# Patient Record
Sex: FEMALE | Race: WHITE | Employment: OTHER | URBAN - METROPOLITAN AREA
[De-identification: names, ages, dates, MRNs, and addresses within clinical notes are randomized per-mention and may not be internally consistent; named-entity substitution may affect disease eponyms.]

---

## 2017-02-09 ENCOUNTER — TELEPHONE (OUTPATIENT)
Dept: FAMILY MEDICINE | Facility: CLINIC | Age: 57
End: 2017-02-09

## 2017-02-09 DIAGNOSIS — C25.1 MALIGNANT NEOPLASM OF BODY OF PANCREAS (H): ICD-10-CM

## 2017-02-09 DIAGNOSIS — Z20.828 EXPOSURE TO THE FLU: Primary | ICD-10-CM

## 2017-02-09 RX ORDER — OSELTAMIVIR PHOSPHATE 75 MG/1
75 CAPSULE ORAL DAILY
Qty: 10 CAPSULE | Refills: 0 | Status: SHIPPED | OUTPATIENT
Start: 2017-02-09 | End: 2017-04-04

## 2017-02-09 NOTE — TELEPHONE ENCOUNTER
Significant other with influenza    Orders Placed This Encounter     oseltamivir (TAMIFLU) 75 MG capsule     Sig: Take 1 capsule (75 mg) by mouth daily     Dispense:  10 capsule     Refill:  0     Called and reviewed

## 2017-04-04 ENCOUNTER — OFFICE VISIT (OUTPATIENT)
Dept: FAMILY MEDICINE | Facility: CLINIC | Age: 57
End: 2017-04-04
Payer: COMMERCIAL

## 2017-04-04 VITALS
SYSTOLIC BLOOD PRESSURE: 148 MMHG | DIASTOLIC BLOOD PRESSURE: 90 MMHG | HEIGHT: 66 IN | HEART RATE: 62 BPM | TEMPERATURE: 98.3 F | WEIGHT: 134.38 LBS | BODY MASS INDEX: 21.6 KG/M2

## 2017-04-04 DIAGNOSIS — R07.89 CHEST PRESSURE: ICD-10-CM

## 2017-04-04 DIAGNOSIS — H92.02 EAR PAIN, LEFT: Primary | ICD-10-CM

## 2017-04-04 DIAGNOSIS — R42 DIZZINESS: ICD-10-CM

## 2017-04-04 DIAGNOSIS — B35.1 ONYCHOMYCOSIS: ICD-10-CM

## 2017-04-04 DIAGNOSIS — Z20.828 EXPOSURE TO THE FLU: ICD-10-CM

## 2017-04-04 DIAGNOSIS — J45.20 MILD INTERMITTENT ASTHMA WITHOUT COMPLICATION: ICD-10-CM

## 2017-04-04 PROCEDURE — 99214 OFFICE O/P EST MOD 30 MIN: CPT | Performed by: FAMILY MEDICINE

## 2017-04-04 RX ORDER — CICLOPIROX 80 MG/ML
SOLUTION TOPICAL
Qty: 6.6 ML | Refills: 5 | Status: SHIPPED | OUTPATIENT
Start: 2017-04-04 | End: 2018-09-17

## 2017-04-04 NOTE — MR AVS SNAPSHOT
After Visit Summary   4/4/2017    Kiersten Garibay    MRN: [id number]           Patient Information     Date Of Birth          1960        Visit Information        Provider Department      4/4/2017 10:40 AM Jhon Abbott DO [facility]        Today's Diagnoses     Ear pain, left    -  1    Exposure to the flu        Onychomycosis          Care Instructions    Follow up with ENT as discussed  Close monitoring of chest pressure  Advised ED if not resolving  I would recommend EKG and chest x-ray today as discussed    [facility]   Discharged by : Tiffani BETH, Certified Medical Assistant (AAMA)April 4, 2017 11:31 AM    Paper scripts provided to patient : none   If you have any questions regarding to your visit please contact your care team:   [facility] Clinic Hours Telephone Number   Dr. Tiffani Sorensen, TRACE   7am-7pm Monday - Thursday   7am-5pm Fridays  [phone]   (Appointment scheduling available 24/7)   RN Line   [phone] option 2     What options do I have for visits at the clinic other than the traditional office visit?   To expand how we care for you, many of our providers are utilizing electronic visits (e-visits) and telephone visits, when medically appropriate, for interactions with their patients rather than a visit in the clinic. We also offer nurse visits for many medical concerns. Just like any other service, we will bill your insurance company for this type of visit based on time spent on the phone with your provider. Not all insurance companies cover these visits. Please check with your medical insurance if this type of visit is covered. You will be responsible for any charges that are not paid by your insurance.   E-visits via Wicked Loot: generally incur a $35.00 fee.   Telephone visits:   Time spent on the phone: *charged based on time that is spent on the phone in increments of 10  minutes. Estimated cost:   5-10 mins $30.00   11-20 mins. $59.00   21-30 mins. $85.00   Use Yuntaa (secure email communication and access to your chart) to send your primary care provider a message or make an appointment. Ask someone on your Team how to sign up for Yuntaa.   For a Price Quote for your services, please call our Consumer Price Line at [phone].   As always, Thank you for trusting us with your health care needs!                    [facility]:    Scheduling Appointments  [place]  Call: [phone]    [place], [facility]  Call: [phone]    [facility]  Call: [phone]    WHERE TO GO FOR CARE?    Clinic    Make an appointment if you:       Are sick (cold, cough, flu, sore throat, earache or in pain).       Have a small injury (sprain, small cut, burn or broken bone).       Need a physical exam, Pap smear, vaccine or prescription refill.       Have questions about your health or medicines.    To reach us:      Call [phone] ([phone]). Open 24 hours every day. (For counseling services, call [phone].)    Log into Yuntaa at [email or web address]. (Visit [email or web address] to create an account.) Hospital emergency room    An emergency is a serious or life- threatening problem that must be treated right away.    Call [phone] or get to the hospital if you have:      Very bad or sudden:            - Chest pain or pressure         - Bleeding         - Head or belly pain         - Dizziness or trouble seeing, walking or                          Speaking      Problems breathing      Blood in your vomit or you are coughing up blood      A major injury (knocked out, loss of a finger or limb, rape, broken bone protruding from skin)    A mental health crisis. (Or call the Mental Health Crisis line at 1-456.627.1534 or Suicide Prevention Hotline at 1-266.735.6603.)    Open 24 hours every day. You don't  need an appointment.     Urgent care    Visit urgent care for sickness or small injuries when the clinic is closed. You don't need an appointment. To check hours or find an urgent care near you, visit [email or web address]. Online care    Get online care from [facility]\"" for more than 70 common problems, like colds, allergies and infections. Open 24 hours every day at: [email or web address]   Need help deciding?    For advice about where to be seen, you may call your clinic and ask to speak with a nurse. We're here for you 24 hours every day.         If you are deaf or hard of hearing, please let us know. We provide many free services including sign language interpreters, oral interpreters, TTYs, telephone amplifiers, note takers and written materials.               Follow-ups after your visit        Additional Services     OTOLARYNGOLOGY REFERRAL       Your provider has referred you to: LARRYG: [facility] Kelli [phone]   [email or web address]    Please be aware that coverage of these services is subject to the terms and limitations of your health insurance plan.  Call member services at your health plan with any benefit or coverage questions.      Please bring the following with you to your appointment:    (1) Any X-Rays, CTs or MRIs which have been performed.  Contact the facility where they were done to arrange for  prior to your scheduled appointment.   (2) List of current medications  (3) This referral request   (4) Any documents/labs given to you for this referral                  Who to contact     If you have questions or need follow up information about today's clinic visit or your schedule please contact [facility] directly at [phone].  Normal or non-critical lab and imaging results will be communicated to you by MyChart, letter or phone within 4 business days after the clinic has received the results. If you do not hear from us  "within 7 days, please contact the clinic through Posterbee or phone. If you have a critical or abnormal lab result, we will notify you by phone as soon as possible.  Submit refill requests through Posterbee or call your pharmacy and they will forward the refill request to us. Please allow 3 business days for your refill to be completed.          Additional Information About Your Visit        Digital MagicsharZwipe Information     Posterbee gives you secure access to your electronic health record. If you see a primary care provider, you can also send messages to your care team and make appointments. If you have questions, please call your primary care clinic.  If you do not have a primary care provider, please call [phone] and they will assist you.        Care EveryWhere ID     This is your Care EveryWhere ID. This could be used by other organizations to access your [facility] medical records  [id number]        Your Vitals Were     Pulse Temperature Height Last Period BMI (Body Mass Index)       62 98.3  F (36.8  C) (Oral) 5' 5.5\" (1.664 m) 03/24/2006 22.02 kg/m2        Blood Pressure from Last 3 Encounters:   04/04/17 148/90   10/17/16 (!) 160/93   07/28/16 140/86    Weight from Last 3 Encounters:   04/04/17 134 lb 6 oz (61 kg)   10/17/16 133 lb 1.6 oz (60.4 kg)   07/28/16 133 lb 11.2 oz (60.6 kg)              We Performed the Following     OTOLARYNGOLOGY REFERRAL          Today's Medication Changes          These changes are accurate as of: 4/4/17 11:31 AM.  If you have any questions, ask your nurse or doctor.               Stop taking these medicines if you haven't already. Please contact your care team if you have questions.     order for DME   Stopped by:  Jhon Abbott, DO           vitamin B complex with vitamin C Tabs tablet   Stopped by:  Jhon Abbott, DO                Where to get your medicines      These medications were sent to [facility] [address]  [address] [facility] [address]     Phone:  [phone]     ciclopirox 8 % Soln                Primary Care Provider Office Phone # Fax #    Barak Childress [phone]       [facility] [address] [address]        Thank you!     Thank you for choosing [facility]  for your care. Our goal is always to provide you with excellent care. Hearing back from our patients is one way we can continue to improve our services. Please take a few minutes to complete the written survey that you may receive in the mail after your visit with us. Thank you!             Your Updated Medication List - Protect others around you: Learn how to safely use, store and throw away your medicines at www.disposemymeds.org.          This list is accurate as of: 4/4/17 11:31 AM.  Always use your most recent med list.                   Brand Name Dispense Instructions for use    beclomethasone 80 MCG/ACT Inhaler    QVAR    1 Inhaler    Inhale 2 puffs into the lungs 2 times daily       ciclopirox 8 % Soln     6.6 mL    Apply thin layer to toes daily, then remove with alcohol after 7 days. Repeat.       estradiol 0.1 MG/GM cream    ESTRACE    42.5 g    1-3 gr 3x a week as needed. One month supply       losartan 25 MG tablet    COZAAR    45 tablet    Take 0.5 tablets (12.5 mg) by mouth daily       MULTIVITAMIN Liqd          OMEGA-3 FISH OIL PO      Take by mouth daily       TRANSDERM-SCOP (1.5 MG) 72 hr patch   Generic drug:  scopolamine      Place 1 patch onto the skin every 72 hours       UNABLE TO FIND      MEDICATION NAME: OPC-3

## 2017-04-04 NOTE — PATIENT INSTRUCTIONS
Follow up with ENT as discussed  Close monitoring of chest pressure  Advised ED if not resolving  I would recommend EKG and chest x-ray today as discussed    [facility]   Discharged by : Tiffani BETH, Certified Medical Assistant (AAMA)April 4, 2017 11:31 AM    Paper scripts provided to patient : none   If you have any questions regarding to your visit please contact your care team:   [facility] Clinic Hours Telephone Number   Dr. Tiffani Sorensen PA-C   7am-7pm Monday - Thursday   7am-5pm Fridays  [phone]   (Appointment scheduling available 24/7)   RN Line   [phone] option 2     What options do I have for visits at the clinic other than the traditional office visit?   To expand how we care for you, many of our providers are utilizing electronic visits (e-visits) and telephone visits, when medically appropriate, for interactions with their patients rather than a visit in the clinic. We also offer nurse visits for many medical concerns. Just like any other service, we will bill your insurance company for this type of visit based on time spent on the phone with your provider. Not all insurance companies cover these visits. Please check with your medical insurance if this type of visit is covered. You will be responsible for any charges that are not paid by your insurance.   E-visits via Teraco Data Environments: generally incur a $35.00 fee.   Telephone visits:   Time spent on the phone: *charged based on time that is spent on the phone in increments of 10 minutes. Estimated cost:   5-10 mins $30.00   11-20 mins. $59.00   21-30 mins. $85.00   Use [employer]vironments (secure email communication and access to your chart) to send your primary care provider a message or make an appointment. Ask someone on your Team how to sign up for [employer]vironments.   For a Price Quote for your services, please call our Consumer Price Line at [phone].   As always, Thank you for trusting us with  your health care needs!                    [facility]:    Scheduling Appointments  [facility]  Call: [phone]    [facility]  Call: [phone]    [facility]  Call: [phone]    WHERE TO GO FOR CARE?    Clinic    Make an appointment if you:       Are sick (cold, cough, flu, sore throat, earache or in pain).       Have a small injury (sprain, small cut, burn or broken bone).       Need a physical exam, Pap smear, vaccine or prescription refill.       Have questions about your health or medicines.    To reach us:      Call [phone] ([phone]). Open 24 hours every day. (For counseling services, call [phone].)    Log into [employer] at BabyGlowz. (Visit [email or web address] to create an account.) Hospital emergency room    An emergency is a serious or life- threatening problem that must be treated right away.    Call 727 or get to the hospital if you have:      Very bad or sudden:            - Chest pain or pressure         - Bleeding         - Head or belly pain         - Dizziness or trouble seeing, walking or                          Speaking      Problems breathing      Blood in your vomit or you are coughing up blood      A major injury (knocked out, loss of a finger or limb, rape, broken bone protruding from skin)    A mental health crisis. (Or call the Mental Health Crisis line at 1-832.571.9373 or Suicide Prevention Hotline at 1-984.341.4348.)    Open 24 hours every day. You don't need an appointment.     Urgent care    Visit urgent care for sickness or small injuries when the clinic is closed. You don't need an appointment. To check hours or find an urgent care near you, visit [email or web address]. Online care    Get online care from [facility] for more than 70 common problems, like colds, allergies and infections. Open 24 hours every day at: [email or web address]   Need help  deciding?    For advice about where to be seen, you may call your clinic and ask to speak with a nurse. We're here for you 24 hours every day.         If you are deaf or hard of hearing, please let us know. We provide many free services including sign language interpreters, oral interpreters, TTYs, telephone amplifiers, note takers and written materials.
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
## 2017-04-04 NOTE — NURSING NOTE
"Chief Complaint   Patient presents with     Ear Problem       Initial /90 (BP Location: Right arm, Cuff Size: Adult Regular)  Pulse 62  Temp 98.3  F (36.8  C) (Oral)  Ht 5' 5.5\" (1.664 m)  Wt 134 lb 6 oz (61 kg)  LMP 03/24/2006  BMI 22.02 kg/m2 Estimated body mass index is 22.02 kg/(m^2) as calculated from the following:    Height as of this encounter: 5' 5.5\" (1.664 m).    Weight as of this encounter: 134 lb 6 oz (61 kg).  Medication Reconciliation: complete     Tiffani BETH, Certified Medical Assistant (AAMA)April 4, 2017 10:53 AM      "

## 2017-04-05 ASSESSMENT — ASTHMA QUESTIONNAIRES: ACT_TOTALSCORE: 24

## 2017-04-11 ENCOUNTER — OFFICE VISIT (OUTPATIENT)
Dept: OTOLARYNGOLOGY | Facility: CLINIC | Age: 57
End: 2017-04-11
Payer: COMMERCIAL

## 2017-04-11 ENCOUNTER — OFFICE VISIT (OUTPATIENT)
Dept: AUDIOLOGY | Facility: CLINIC | Age: 57
End: 2017-04-11
Payer: COMMERCIAL

## 2017-04-11 VITALS — HEIGHT: 66 IN | BODY MASS INDEX: 21.53 KG/M2 | WEIGHT: 134 LBS | RESPIRATION RATE: 16 BRPM

## 2017-04-11 DIAGNOSIS — R42 VERTIGO: Primary | ICD-10-CM

## 2017-04-11 DIAGNOSIS — H69.92 DYSFUNCTION OF EUSTACHIAN TUBE, LEFT: ICD-10-CM

## 2017-04-11 DIAGNOSIS — R42 INTERMITTENT VERTIGO: Primary | ICD-10-CM

## 2017-04-11 DIAGNOSIS — H92.02 REFERRED OTALGIA OF LEFT EAR: ICD-10-CM

## 2017-04-11 PROCEDURE — 92557 COMPREHENSIVE HEARING TEST: CPT | Performed by: AUDIOLOGIST

## 2017-04-11 PROCEDURE — 99203 OFFICE O/P NEW LOW 30 MIN: CPT | Performed by: OTOLARYNGOLOGY

## 2017-04-11 PROCEDURE — 92567 TYMPANOMETRY: CPT | Performed by: AUDIOLOGIST

## 2017-04-11 ASSESSMENT — PAIN SCALES - GENERAL: PAINLEVEL: MILD PAIN (2)

## 2017-04-11 NOTE — PROGRESS NOTES
Chief Complaint - Dizziness, ear pain    History of Present Illness - Kiersten Garibay is a 56 year old female who presents with dizziness. The patient describes vertigo in which the entire room spins, or they spin, or there is a sense of motion.  It lasts for hours to a day. This has happened 3-4 times in last year. This has been going on for a year. The patient has nausea. It is provoked by nothing she can point to. The patient has noted left ear symptoms. + left otalgia, and maybe otorrhea, but no hearing loss. No right ear troubles. Has a history of TMJ, but nothing lately. Has tried ibuprofen, helps the pain.     Past Medical History -   Patient Active Problem List   Diagnosis     Mild intermittent asthma     Stress urinary incontinence     CARDIOVASCULAR SCREENING; LDL GOAL LESS THAN 160     Low back pain     Hypertension goal BP (blood pressure) < 140/90     Malignant neoplasm of body of pancreas (H)     Abscess     Essential hypertension with goal blood pressure less than 140/90       Current Medications -   Current Outpatient Prescriptions:      ciclopirox 8 % SOLN, Apply thin layer to toes daily, then remove with alcohol after 7 days. Repeat., Disp: 6.6 mL, Rfl: 5     scopolamine (TRANSDERM-SCOP, 1.5 MG,) (1.5mg base/3day) patch, Place 1 patch onto the skin every 72 hours, Disp: , Rfl:      losartan (COZAAR) 25 MG tablet, Take 0.5 tablets (12.5 mg) by mouth daily, Disp: 45 tablet, Rfl: 3     beclomethasone (QVAR) 80 MCG/ACT Inhaler, Inhale 2 puffs into the lungs 2 times daily, Disp: 1 Inhaler, Rfl: 11     estradiol (ESTRACE) 0.1 MG/GM vaginal cream, 1-3 gr 3x a week as needed. One month supply, Disp: 42.5 g, Rfl: 2     Omega-3 Fatty Acids (OMEGA-3 FISH OIL PO), Take by mouth daily, Disp: , Rfl:      UNABLE TO FIND, MEDICATION NAME: OPC-3, Disp: , Rfl:      Multiple Vitamins-Minerals (MULTIVITAMIN) LIQD, , Disp: , Rfl:     Allergies -   Allergies   Allergen Reactions     No Known Drug Allergies         Social History -   Social History     Social History     Marital status:      Spouse name: N/A     Number of children: 2     Years of education: N/A     Occupational History           [employer].       [place]     Social History Main Topics     Smoking status: Former Smoker     Packs/day: 1.00     Years: 15.00     Types: Cigarettes     Quit date: 5/2/1990     Smokeless tobacco: Former User      Comment: quit 16-17 years ago     Alcohol use 0.0 oz/week     0 Standard drinks or equivalent per week      Comment: social - 5 drinks a week     Drug use: No     Sexual activity: Yes     Partners: Male     Other Topics Concern      Service No     Blood Transfusions No     Caffeine Concern No     Occupational Exposure No     Hobby Hazards No     Sleep Concern No     Stress Concern No     Weight Concern No     Special Diet No     Back Care No     Exercise Yes     Bike Helmet No     Seat Belt Yes     Self-Exams Yes     Parent/Sibling W/ Cabg, Mi Or Angioplasty Before 65f 55m? Yes     father     Social History Narrative    Caffeine intake/servings daily - 1-2    Calcium intake/servings daily - 0-1    Exercise 0 times weekly - describe walking, cardio, weights    Sunscreen used - Yes    Seatbelts used - Yes    Guns stored in the home - Yes    Self Breast Exam - No    Pap test up to date -  Yes as of today     Eye exam up to date -  Yes    Dental exam up to date -  Yes    DEXA scan up to date -  No    Flex Sig/Colonoscopy up to date -  Yes    Mammography up to date -  No    Immunizations reviewed and up to date - Yes    Abuse: Current or Past (Physical, Sexual or Emotional) - No    Do you feel safe in your environment - Yes    Do you cope well with stress - Yes    Do you suffer from insomnia - No        Nicolasa Oneal MA January 13, 2012                           Family History -   Family History   Problem Relation Age of Onset     CANCER Mother      KIDNEY CANCER     Lipids  "Mother      Glaucoma Mother      HEART DISEASE Father      Hypertension Father      DIABETES Father      Neurologic Disorder Father      MIGRAINES     Hypertension Sister      HEART DISEASE Paternal Grandmother      CANCER Paternal Grandmother      STOMACH CANCER     CEREBROVASCULAR DISEASE Maternal Grandfather      CEREBROVASCULAR DISEASE Paternal Grandfather      DIABETES Paternal Grandfather      Alzheimer Disease Maternal Grandmother      Lipids Sister        Review of Systems - As per HPI and PMHx, otherwise 7 system review of the head and neck negative.    Physical Exam  Resp 16  Ht 1.664 m (5' 5.5\")  Wt 60.8 kg (134 lb)  LMP 03/24/2006  BMI 21.96 kg/m2  General - The patient is in no distress.  Alert and oriented x3, answers questions and cooperates with examination appropriately.   Voice and Breathing - The patient was breathing comfortably without the use of accessory muscles. There was no wheezing, stridor, or stertor.  The patients voice was clear and strong, with no dysphonia.    Eyes - Pupils are reactive to light. Extraocular movements intact. Sclera were not icteric or injected, conjunctiva were pink and moist. No nystagmus.  Neurologic - Cranial nerves II-XII are grossly intact. Specifically, the facial nerve is intact, House-Brackmann grade 1 of 6.    Mouth - Examination of the oral cavity showed pink, healthy oral mucosa. No lesions or ulcerations noted.  The tongue was mobile and protrudes midline.   Oropharynx - The walls of the oropharynx were smooth, pink, moist, symmetric, and had no lesions or ulcerations. The uvula was midline and the palate raised symmetrically.   Neck -  Palpation of the occipital, submental, submandibular, internal jugular chain, and supraclavicular nodes did not demonstrate any abnormal lymph nodes or masses. The parotid glands were without masses. Palpation of the thyroid was soft and smooth, with no nodules or goiter appreciated.  The trachea was midline.  Ears - " The auricles appeared normal. The external auditory canals were nonedematous and nonerythematous. The tympanic membranes are normal in appearance, bony landmarks are intact.  No retraction, perforation, or masses.  No fluid or purulence was seen in the external canal or the middle ear.     Audiologic Studies - An audiogram and tympanogram were performed today as part of the evaluation and personally reviewed. The tympanogram shows normal Type A curves, with normal canal volumes and middle ear pressures.  There is no sign of eustachian tube dysfunction or middle ear effusion.  The audiogram was also normal.        A/P - Kiersten Garibay is a 56 year old female with dizziness. This seems most likely Meniere's versus vestibular migraine. She has no hearing loss, and so it cannot be called meniere's at this point. i gave her some information on these diagnosis. She can try salt restriction and return if things worsen. What bothers her more is the left ear pain. However, the ear canal, tympanic membrane, and middle ear appear normal. i worry this maybe TMJ. I gave her a handout on this, soft diet, warm packs, massage. If these things fail we may have to consider some imaging. She will let me know.          Turner Kirkland MD  Otolaryngology  [facility]

## 2017-04-11 NOTE — NURSING NOTE
"Chief Complaint   Patient presents with     Otalgia     Left ear     Dizziness       Initial Resp 16  Ht 1.664 m (5' 5.5\")  Wt 60.8 kg (134 lb)  LMP 03/24/2006  BMI 21.96 kg/m2 Estimated body mass index is 21.96 kg/(m^2) as calculated from the following:    Height as of this encounter: 1.664 m (5' 5.5\").    Weight as of this encounter: 60.8 kg (134 lb).  Medication Reconciliation: complete     Tabitha Wade MA    "

## 2017-04-11 NOTE — PROGRESS NOTES
Patient was referred to Audiology from ENT by Dr. Kirkland for a hearing examination. Patient reports long duration episodes of true vertigo which have been ongoing for the past few years. Recent onset of left ear otalgia.     Testing:    Otoscopy:   Clear canals free of cerumen bilaterally.     Tympanograms:   Tympanometric findings were Normal ear canal volume and compliance (Type A) bilaterally.     Thresholds:   Puretone thresholds obtained with insert earphones show normal hearing sensitivity for all frequencies tested bilaterally (see scanned audiogram). Speech reception thresholds were in agreement with puretone findings bilaterally. Speech discrimination scores were excellent bilaterally (Right: 100%; Left: 100%).     Discussed results with the patient.     Patient was returned to ENT for follow up.     Dain Villalba CCC-A  Licensed Audiologist  4/11/2017

## 2017-04-11 NOTE — PATIENT INSTRUCTIONS
General Scheduling Information  To schedule your CT/MRI scan, please contact [place] [facility] at [phone]   [address] [place] [address]    To schedule your Surgery, please contact our Specialty Schedulers at [phone]    ENT Clinic Locations Clinic Hours Telephone Number     [facility]  [address] [place] [address]   Tuesday:       8:00am -- 4:00pm    Wednesday:  8:00am - 4:00pm   To schedule an appointment with   Dr. Kirkland,   please contact our   Specialty Scheduling Department at:     [phone]       [facility]  [address] [place] [address]   Friday:          8:00am - 4:00pm         Urgent Care Locations Clinic Hours Telephone Numbers     [facility]  [address] [place] [address]     Monday-Friday:     11:00pm - 9:00pm    Saturday-Sunday:  9:00am - 5:00pm   [phone]     [facility]  [address] [place] [address]     Monday-Friday:      5:00pm - 9:00pm     Saturday-Sunday:  9:00am - 5:00pm   [phone]

## 2017-04-11 NOTE — MR AVS SNAPSHOT
After Visit Summary   4/11/2017    Kiersten Garibay    MRN: [id number]           Patient Information     Date Of Birth          1960        Visit Information        Provider Department      4/11/2017 11:45 AM Dain Mckinney AuD [facility]        Today's Diagnoses     Intermittent vertigo    -  1    Dysfunction of eustachian tube, left           Follow-ups after your visit        Who to contact     If you have questions or need follow up information about today's clinic visit or your schedule please contact [facility] directly at [phone].  Normal or non-critical lab and imaging results will be communicated to you by LeadGeniushart, letter or phone within 4 business days after the clinic has received the results. If you do not hear from us within 7 days, please contact the clinic through LeadGeniushart or phone. If you have a critical or abnormal lab result, we will notify you by phone as soon as possible.  Submit refill requests through [employer] or call your pharmacy and they will forward the refill request to us. Please allow 3 business days for your refill to be completed.          Additional Information About Your Visit        MyChart Information     [employer] gives you secure access to your electronic health record. If you see a primary care provider, you can also send messages to your care team and make appointments. If you have questions, please call your primary care clinic.  If you do not have a primary care provider, please call [phone] and they will assist you.        Care EveryWhere ID     This is your Care EveryWhere ID. This could be used by other organizations to access your [place] medical records  [id number]        Your Vitals Were     Last Period                   03/24/2006            Blood Pressure from Last 3 Encounters:   04/04/17 148/90   10/17/16 (!) 160/93   07/28/16 140/86    Weight from Last 3 Encounters:   04/11/17 134 lb (60.8 kg)    04/04/17 134 lb 6 oz (61 kg)   10/17/16 133 lb 1.6 oz (60.4 kg)              We Performed the Following     AUDIOGRAM/TYMPANOGRAM - INTERFACE     COMPREHENSIVE HEARING TEST     TYMPANOMETRY        Primary Care Provider Office Phone # Fax #    Barak Childress [phone]       [facility] [address] [place] [address]        Thank you!     Thank you for choosing [facility]  for your care. Our goal is always to provide you with excellent care. Hearing back from our patients is one way we can continue to improve our services. Please take a few minutes to complete the written survey that you may receive in the mail after your visit with us. Thank you!             Your Updated Medication List - Protect others around you: Learn how to safely use, store and throw away your medicines at www.disposemymeds.org.          This list is accurate as of: 4/11/17 12:38 PM.  Always use your most recent med list.                   Brand Name Dispense Instructions for use    beclomethasone 80 MCG/ACT Inhaler    QVAR    1 Inhaler    Inhale 2 puffs into the lungs 2 times daily       ciclopirox 8 % Soln     6.6 mL    Apply thin layer to toes daily, then remove with alcohol after 7 days. Repeat.       estradiol 0.1 MG/GM cream    ESTRACE    42.5 g    1-3 gr 3x a week as needed. One month supply       losartan 25 MG tablet    COZAAR    45 tablet    Take 0.5 tablets (12.5 mg) by mouth daily       MULTIVITAMIN Liqd          OMEGA-3 FISH OIL PO      Take by mouth daily       TRANSDERM-SCOP (1.5 MG) 72 hr patch   Generic drug:  scopolamine      Place 1 patch onto the skin every 72 hours       UNABLE TO FIND      MEDICATION NAME: OPC-3

## 2017-04-11 NOTE — MR AVS SNAPSHOT
After Visit Summary   4/11/2017    Kiersten Garibay    MRN: [id number]           Patient Information     Date Of Birth          1960        Visit Information        Provider Department      4/11/2017 11:45 AM Turner Kirkland MD [facility]        Today's Diagnoses     Vertigo    -  1    Referred otalgia of left ear          Care Instructions    General Scheduling Information  To schedule your CT/MRI scan, please contact [place] [facility] at [phone] [address] [place] [address]    To schedule your Surgery, please contact our Specialty Schedulers at [phone]    ENT Clinic Locations Clinic Hours Telephone Number     [place] [facility]  [address]  [place] [address]   Tuesday:       8:00am -- 4:00pm    Wednesday:  8:00am - 4:00pm   To schedule an appointment with   Dr. Kirkland,   please contact our   Specialty Scheduling Department at:     [phone]       [facility]  [address] [place] [address]   Friday:          8:00am - 4:00pm         Urgent Care Locations Clinic Hours Telephone Numbers     [place]  [address] [place] [address]     Monday-Friday:     11:00pm - 9:00pm    Saturday-Sunday:  9:00am - 5:00pm   [phone]     [place]  [address] [place] [address]     Monday-Friday:      5:00pm - 9:00pm     Saturday-Sunday:  9:00am - 5:00pm   [phone]             Follow-ups after your visit        Additional Services     AUDIOLOGY ADULT REFERRAL       Your provider has referred you to: [facility]: [facility] [phone]   [email or web address]    Treatment:  Evaluation & Treatment  Specialty Testing:  Audiogram w/Tymps and Reflexes    Please be aware that coverage of these services is subject to the terms and limitations of your health insurance plan.  Call member services at your health plan with any benefit or coverage questions.   "    Please bring the following to your appointment:  >>   Any x-rays, CTs or MRIs which have been performed.  Contact the facility where they were done to arrange for  prior to your scheduled appointment.   >>   List of current medications  >>   This referral request   >>   Any documents/labs given to you for this referral                  Who to contact     If you have questions or need follow up information about today's clinic visit or your schedule please contact [facility] directly at [phone].  Normal or non-critical lab and imaging results will be communicated to you by Sh[employer], letter or phone within 4 business days after the clinic has received the results. If you do not hear from us within 7 days, please contact the clinic through [employer] or phone. If you have a critical or abnormal lab result, we will notify you by phone as soon as possible.  Submit refill requests through [employer] or call your pharmacy and they will forward the refill request to us. Please allow 3 business days for your refill to be completed.          Additional Information About Your Visit        [employer] Information     [employer] gives you secure access to your electronic health record. If you see a primary care provider, you can also send messages to your care team and make appointments. If you have questions, please call your primary care clinic.  If you do not have a primary care provider, please call [phone] and they will assist you.        Care EveryWhere ID     This is your Care EveryWhere ID. This could be used by other organizations to access your [place] medical records  [id number]        Your Vitals Were     Respirations Height Last Period BMI (Body Mass Index)          16 1.664 m (5' 5.5\") 03/24/2006 21.96 kg/m2         Blood Pressure from Last 3 Encounters:   04/04/17 148/90   10/17/16 (!) 160/93   07/28/16 140/86    Weight from Last 3 Encounters:   04/11/17 60.8 kg (134 lb)   04/04/17 61 kg " (134 lb 6 oz)   10/17/16 60.4 kg (133 lb 1.6 oz)              We Performed the Following     AUDIOLOGY ADULT REFERRAL        Primary Care Provider Office Phone # Fax #    Barak Childress [phone]       [facility] [address] [place] [address]        Thank you!     Thank you for choosing [facility]  for your care. Our goal is always to provide you with excellent care. Hearing back from our patients is one way we can continue to improve our services. Please take a few minutes to complete the written survey that you may receive in the mail after your visit with us. Thank you!             Your Updated Medication List - Protect others around you: Learn how to safely use, store and throw away your medicines at www.disposemymeds.org.          This list is accurate as of: 4/11/17  4:07 PM.  Always use your most recent med list.                   Brand Name Dispense Instructions for use    beclomethasone 80 MCG/ACT Inhaler    QVAR    1 Inhaler    Inhale 2 puffs into the lungs 2 times daily       ciclopirox 8 % Soln     6.6 mL    Apply thin layer to toes daily, then remove with alcohol after 7 days. Repeat.       estradiol 0.1 MG/GM cream    ESTRACE    42.5 g    1-3 gr 3x a week as needed. One month supply       losartan 25 MG tablet    COZAAR    45 tablet    Take 0.5 tablets (12.5 mg) by mouth daily       MULTIVITAMIN Liqd          OMEGA-3 FISH OIL PO      Take by mouth daily       TRANSDERM-SCOP (1.5 MG) 72 hr patch   Generic drug:  scopolamine      Place 1 patch onto the skin every 72 hours       UNABLE TO FIND      MEDICATION NAME: OPC-3

## 2017-07-02 DIAGNOSIS — I10 ESSENTIAL HYPERTENSION WITH GOAL BLOOD PRESSURE LESS THAN 140/90: ICD-10-CM

## 2017-07-03 ENCOUNTER — MYC MEDICAL ADVICE (OUTPATIENT)
Dept: NURSING | Facility: CLINIC | Age: 57
End: 2017-07-03

## 2017-07-03 DIAGNOSIS — J45.20 MILD INTERMITTENT ASTHMA WITHOUT COMPLICATION: ICD-10-CM

## 2017-07-03 RX ORDER — LOSARTAN POTASSIUM 25 MG/1
TABLET ORAL
Qty: 45 TABLET | Refills: 0 | Status: SHIPPED | OUTPATIENT
Start: 2017-07-03 | End: 2017-09-11

## 2017-07-03 NOTE — TELEPHONE ENCOUNTER
My chart message sent patient is due for office visit. Medication is being filled for 1 time refill only due to:  office visit due    Amy Santamaria,Clinic Rn  [facility] [place]

## 2017-07-03 NOTE — TELEPHONE ENCOUNTER
losartan (COZAAR) 25 MG tablet   12.5 mg, DAILY 3 ordered  Edit     Summary: Take 0.5 tablets (12.5 mg) by mouth daily, Disp-45 tablet, R-3, E-Prescribe   Dose, Route, Frequency: 12.5 mg, Oral, DAILY  Start: 6/8/2016  Ord/Sold: 6/8/2016 (O)  Report  Taking:   Long-term:   Pharmacy: [address]  Med Dose History       Patient Sig: Take 0.5 tablets (12.5 mg) by mouth daily       Ordered on: 6/8/2016       Authorized by: KALEY RUSSELL       Dispense: 45 tablet          Last Office Visit with [facility] P or [facility] prescribing provider: 4-4-2017  Next 5 appointments (look out 90 days)     Aug 11, 2017  2:00 PM CDT   PHYSICAL with Kate Talbot MD   [facility] ([facility]    [address] [place] [address] [phone]                   Potassium   Date Value Ref Range Status   10/17/2016 3.7 3.4 - 5.3 mmol/L Final     Creatinine   Date Value Ref Range Status   10/17/2016 0.78 0.52 - 1.04 mg/dL Final     BP Readings from Last 3 Encounters:   04/04/17 148/90   10/17/16 (!) 160/93   07/28/16 140/86

## 2017-07-04 ENCOUNTER — MYC REFILL (OUTPATIENT)
Dept: FAMILY MEDICINE | Facility: CLINIC | Age: 57
End: 2017-07-04

## 2017-07-04 DIAGNOSIS — J45.20 MILD INTERMITTENT ASTHMA WITHOUT COMPLICATION: ICD-10-CM

## 2017-07-05 NOTE — TELEPHONE ENCOUNTER
My chart message sent routing to PCP do you wish to refill, has been over a year since visit and she is going out of town?  Amy Santamaria,Clinic Rn  [place]

## 2017-07-05 NOTE — TELEPHONE ENCOUNTER
beclomethasone (QVAR) 80 MCG/ACT Inhaler  Last Written Prescription Date: 04/09/2016  Last Fill Quantity: 1inhaler, # refills: 11    Last Office Visit with [facility], [facility] or [facility] prescribing provider:  04/04/2017   Future Office Visit:    Next 5 appointments (look out 90 days)     Aug 11, 2017  2:00 PM CDT   PHYSICAL with Kate Talbot MD   [facility] ([address] [address] [phone]                   Date of Last Asthma Action Plan Letter:   Asthma Action Plan Q1 Year    Topic Date Due     Asthma Action Plan - yearly  06/11/2013      Asthma Control Test:   ACT Total Scores 4/4/2017   ACT TOTAL SCORE (Goal Greater than or Equal to 20) 24   In the past 12 months, how many times did you visit the emergency room for your asthma without being admitted to the hospital? 0   In the past 12 months, how many times were you hospitalized overnight because of your asthma? 0       Date of Last Spirometry Test:   No results found for this or any previous visit.

## 2017-07-05 NOTE — TELEPHONE ENCOUNTER
Message from MyChart:  Original authorizing provider: Barak Childress MD, MD    Kiersten Garibay would like a refill of the following medications:  beclomethasone (QVAR) 80 MCG/ACT Inhaler [Barak Childress MD, MD]    Preferred pharmacy: [address] [facility].    Comment:

## 2017-07-06 NOTE — TELEPHONE ENCOUNTER
Chart reviewed. Kiersten has scheduled an appointment with Dr Talbot for annual exam on 8/11/17 but nothing yet with Dr Childress.  Telephone call to Kiersten- she will schedule, she is headed out today on a motorcycle trip to [facility]. Requests refill Qvar as well.   Latrice Azar RN  Triage  [facility]  [phone]

## 2017-07-06 NOTE — TELEPHONE ENCOUNTER
I am ok with refilling until she is able to come in. Refill was given if she needs more ok to give extra refill    Vamshi Childress MD

## 2017-07-15 ENCOUNTER — HEALTH MAINTENANCE LETTER (OUTPATIENT)
Age: 57
End: 2017-07-15

## 2017-08-10 ENCOUNTER — TELEPHONE (OUTPATIENT)
Dept: FAMILY MEDICINE | Facility: CLINIC | Age: 57
End: 2017-08-10

## 2017-08-10 NOTE — TELEPHONE ENCOUNTER
Reason for call:  Patient reporting a symptom    Symptom or request: fell, needing stiches    Duration (how long have symptoms been present): today    Additional comments: JAYANT Jameson triaging patient.      Call taken on 8/10/2017 at 11:51 AM by Indira Neely

## 2017-08-10 NOTE — TELEPHONE ENCOUNTER
Patient states she has a huge gash above her eye & she believes she needs stiches. She denies ER even thought I informed her they are the most equipped to prevent scarring & to handle facial/plastic issues but patient wants to speak with Laney Guerra is able to see her for facial stitches- I transferred her to their clinic per her request.    Gisell Berumen RN

## 2017-08-11 ENCOUNTER — OFFICE VISIT (OUTPATIENT)
Dept: OBGYN | Facility: CLINIC | Age: 57
End: 2017-08-11
Payer: COMMERCIAL

## 2017-08-11 VITALS
WEIGHT: 135 LBS | BODY MASS INDEX: 22.12 KG/M2 | HEART RATE: 73 BPM | TEMPERATURE: 98.2 F | DIASTOLIC BLOOD PRESSURE: 96 MMHG | SYSTOLIC BLOOD PRESSURE: 151 MMHG

## 2017-08-11 DIAGNOSIS — Z01.419 WELL WOMAN EXAM WITH ROUTINE GYNECOLOGICAL EXAM: Primary | ICD-10-CM

## 2017-08-11 DIAGNOSIS — I10 HYPERTENSION GOAL BP (BLOOD PRESSURE) < 140/90: ICD-10-CM

## 2017-08-11 PROCEDURE — 87624 HPV HI-RISK TYP POOLED RSLT: CPT | Performed by: OBSTETRICS & GYNECOLOGY

## 2017-08-11 PROCEDURE — G0123 SCREEN CERV/VAG THIN LAYER: HCPCS | Performed by: OBSTETRICS & GYNECOLOGY

## 2017-08-11 PROCEDURE — 99396 PREV VISIT EST AGE 40-64: CPT | Performed by: OBSTETRICS & GYNECOLOGY
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
## 2017-08-11 NOTE — MR AVS SNAPSHOT
After Visit Summary   8/11/2017    Kiersten Garibay    MRN: [id number]           Patient Information     Date Of Birth          1960        Visit Information        Provider Department      8/11/2017 2:00 PM Kate Talbot MD [facility]        Today's Diagnoses     Well woman exam with routine gynecological exam    -  1    Hypertension goal BP (blood pressure) < 140/90           Follow-ups after your visit        Who to contact     If you have questions or need follow up information about today's clinic visit or your schedule please contact [facility] directly at [phone].  Normal or non-critical lab and imaging results will be communicated to you by NBA Math Hoopshart, letter or phone within 4 business days after the clinic has received the results. If you do not hear from us within 7 days, please contact the clinic through Micro[employer] or phone. If you have a critical or abnormal lab result, we will notify you by phone as soon as possible.  Submit refill requests through [employer] or call your pharmacy and they will forward the refill request to us. Please allow 3 business days for your refill to be completed.          Additional Information About Your Visit        MyChart Information     [employer] gives you secure access to your electronic health record. If you see a primary care provider, you can also send messages to your care team and make appointments. If you have questions, please call your primary care clinic.  If you do not have a primary care provider, please call [phone] and they will assist you.        Care EveryWhere ID     This is your Care EveryWhere ID. This could be used by other organizations to access your [facility] medical records  [id number]        Your Vitals Were     Pulse Temperature Last Period BMI (Body Mass Index)          73 98.2  F (36.8  C) (Oral) 03/24/2006 22.12 kg/m2         Blood Pressure from Last 3 Encounters:   08/11/17 (!) 151/96    04/04/17 148/90   10/17/16 (!) 160/93    Weight from Last 3 Encounters:   08/11/17 135 lb (61.2 kg)   04/11/17 134 lb (60.8 kg)   04/04/17 134 lb 6 oz (61 kg)              We Performed the Following     HPV High Risk Types DNA Cervical     Pap imaged thin layer screen with HPV - recommended age 30 - 65 years (select HPV order below)          Today's Medication Changes          These changes are accurate as of: 8/11/17  2:57 PM.  If you have any questions, ask your nurse or doctor.               These medicines have changed or have updated prescriptions.        Dose/Directions    beclomethasone 80 MCG/ACT Inhaler   Commonly known as:  QVAR   This may have changed:    - when to take this  - reasons to take this   Used for:  Mild intermittent asthma without complication        Dose:  2 puff   Inhale 2 puffs into the lungs 2 times daily   Quantity:  1 Inhaler   Refills:  1                Primary Care Provider Office Phone # Fax #    Barak Childress [phone]       [facility] [address]        Equal Access to Services     [facility] HospitalJOANA AH: Hadii aad ku hadasho Somoncho, waaxda luqadaha, qaybta kaalmada thelma, leonard villeda . So [facility] [phone].    ATENCIÓN: Si habla español, tiene a king disposición servicios gratuitos de asistencia lingüística. Llame al [phone].    We comply with applicable federal civil rights laws and [place] laws. We do not discriminate on the basis of race, color, national origin, age, disability sex, sexual orientation or gender identity.            Thank you!     Thank you for choosing [facility]  for your care. Our goal is always to provide you with excellent care. Hearing back from our patients is one way we can continue to improve our services. Please take a few minutes to complete the written survey that you may receive in the mail after your visit with us. Thank you!             Your Updated  Medication List - Protect others around you: Learn how to safely use, store and throw away your medicines at www.disposemymeds.org.          This list is accurate as of: 8/11/17  2:57 PM.  Always use your most recent med list.                   Brand Name Dispense Instructions for use Diagnosis    beclomethasone 80 MCG/ACT Inhaler    QVAR    1 Inhaler    Inhale 2 puffs into the lungs 2 times daily    Mild intermittent asthma without complication       ciclopirox 8 % Soln     6.6 mL    Apply thin layer to toes daily, then remove with alcohol after 7 days. Repeat.    Onychomycosis       estradiol 0.1 MG/GM cream    ESTRACE    42.5 g    1-3 gr 3x a week as needed. One month supply    Dyspareunia       losartan 25 MG tablet    COZAAR    45 tablet    TAKE ONE-HALF TABLET BY MOUTH EVERY DAY    Essential hypertension with goal blood pressure less than 140/90       MULTIVITAMIN Liqd           OMEGA-3 FISH OIL PO      Take by mouth daily        TRANSDERM-SCOP (1.5 MG) 72 hr patch   Generic drug:  scopolamine      Place 1 patch onto the skin as needed        UNABLE TO FIND      MEDICATION NAME: OPC-3

## 2017-08-15 LAB
COPATH REPORT: NORMAL
PAP: NORMAL

## 2017-08-17 LAB
FINAL DIAGNOSIS: NORMAL
HPV HR 12 DNA CVX QL NAA+PROBE: NEGATIVE
HPV16 DNA SPEC QL NAA+PROBE: NEGATIVE
HPV18 DNA SPEC QL NAA+PROBE: NEGATIVE
SPECIMEN DESCRIPTION: NORMAL

## 2017-09-11 ENCOUNTER — OFFICE VISIT (OUTPATIENT)
Dept: FAMILY MEDICINE | Facility: CLINIC | Age: 57
End: 2017-09-11
Payer: COMMERCIAL

## 2017-09-11 VITALS
HEIGHT: 66 IN | HEART RATE: 68 BPM | DIASTOLIC BLOOD PRESSURE: 88 MMHG | BODY MASS INDEX: 21.4 KG/M2 | WEIGHT: 133.13 LBS | TEMPERATURE: 98.6 F | SYSTOLIC BLOOD PRESSURE: 136 MMHG

## 2017-09-11 DIAGNOSIS — C25.1 MALIGNANT NEOPLASM OF BODY OF PANCREAS (H): ICD-10-CM

## 2017-09-11 DIAGNOSIS — R73.9 ELEVATED BLOOD SUGAR: ICD-10-CM

## 2017-09-11 DIAGNOSIS — R42 VERTIGO: ICD-10-CM

## 2017-09-11 DIAGNOSIS — I10 ESSENTIAL HYPERTENSION WITH GOAL BLOOD PRESSURE LESS THAN 140/90: Primary | ICD-10-CM

## 2017-09-11 PROCEDURE — 99214 OFFICE O/P EST MOD 30 MIN: CPT | Performed by: FAMILY MEDICINE

## 2017-09-11 PROCEDURE — 80048 BASIC METABOLIC PNL TOTAL CA: CPT | Performed by: FAMILY MEDICINE

## 2017-09-11 PROCEDURE — 36415 COLL VENOUS BLD VENIPUNCTURE: CPT | Performed by: FAMILY MEDICINE

## 2017-09-11 RX ORDER — LOSARTAN POTASSIUM 25 MG/1
25 TABLET ORAL DAILY
Qty: 90 TABLET | Refills: 3 | Status: SHIPPED | OUTPATIENT
Start: 2017-09-11 | End: 2018-10-01 | Stop reason: DRUGHIGH

## 2017-09-11 NOTE — Clinical Note
Dr Kirkland  I saw Kiersten Shettyloren today and she has had 3 episodes in the past 4-5 months of severe vertigo. It resolves spontaneously without and other associated symptoms. She does note left ear discomfort when it occurs. It may be associated with allergies but it comes on and resolves in same day. Wendy you recommend any specific evaluation in addition to what you have already done. She does have a hx of pancreatic cancer but this has been stable and oncology has her on a 2 year follow up plan.  Thanks for your help with her care  Vamshi Childress MD

## 2017-09-11 NOTE — MR AVS SNAPSHOT
"              After Visit Summary   9/11/2017    Kiersten Garibay    MRN: [id number]           Patient Information     Date Of Birth          1960        Visit Information        Provider Department      9/11/2017 1:20 PM Barak Childress MD [facility]        Today's Diagnoses     Essential hypertension with goal blood pressure less than 140/90           Follow-ups after your visit        Who to contact     If you have questions or need follow up information about today's clinic visit or your schedule please contact [facility] directly at [phone].  Normal or non-critical lab and imaging results will be communicated to you by Technitrolhart, letter or phone within 4 business days after the clinic has received the results. If you do not hear from us within 7 days, please contact the clinic through WholeWorldBandt or phone. If you have a critical or abnormal lab result, we will notify you by phone as soon as possible.  Submit refill requests through [employer] or call your pharmacy and they will forward the refill request to us. Please allow 3 business days for your refill to be completed.          Additional Information About Your Visit        MyChart Information     [employer] gives you secure access to your electronic health record. If you see a primary care provider, you can also send messages to your care team and make appointments. If you have questions, please call your primary care clinic.  If you do not have a primary care provider, please call [phone] and they will assist you.        Care EveryWhere ID     This is your Care EveryWhere ID. This could be used by other organizations to access your [place] medical records  [id number]        Your Vitals Were     Pulse Temperature Height Last Period BMI (Body Mass Index)       68 98.6  F (37  C) (Oral) 5' 5.5\" (1.664 m) 03/24/2006 21.82 kg/m2        Blood Pressure from Last 3 Encounters:   09/11/17 136/88   08/11/17 (!) " 151/96   04/04/17 148/90    Weight from Last 3 Encounters:   09/11/17 133 lb 2 oz (60.4 kg)   08/11/17 135 lb (61.2 kg)   04/11/17 134 lb (60.8 kg)              We Performed the Following     Basic metabolic panel          Today's Medication Changes          These changes are accurate as of: 9/11/17  2:24 PM.  If you have any questions, ask your nurse or doctor.               These medicines have changed or have updated prescriptions.        Dose/Directions    beclomethasone 80 MCG/ACT Inhaler   Commonly known as:  QVAR   This may have changed:    - when to take this  - reasons to take this   Used for:  Mild intermittent asthma without complication        Dose:  2 puff   Inhale 2 puffs into the lungs 2 times daily   Quantity:  1 Inhaler   Refills:  1       losartan 25 MG tablet   Commonly known as:  COZAAR   This may have changed:  See the new instructions.   Used for:  Essential hypertension with goal blood pressure less than 140/90   Changed by:  Barak Childress MD        Dose:  25 mg   Take 1 tablet (25 mg) by mouth daily   Quantity:  90 tablet   Refills:  3            Where to get your medicines      These medications were sent to [facility] [address].  [address] [place] [address], [place] [address]     Phone:  [phone]     losartan 25 MG tablet                Primary Care Provider Office Phone # Fax #    Barak Childress [phone]       [address] [place] [address]        Equal Access to Services     MINAL KONG AH: Archana irizarryo Sokellieali, waaxda luqadaha, qaybta kaalmada adeegyada, leonard buckner. So [facility] [phone].    ATENCIÓN: Si habla español, tiene a king disposición servicios gratuitos de asistencia lingüística. Llame al [phone].    We comply with applicable federal civil rights laws and [place] laws. We do not discriminate on the basis of race, color, national origin,  age, disability sex, sexual orientation or gender identity.            Thank you!     Thank you for choosing [facility]  for your care. Our goal is always to provide you with excellent care. Hearing back from our patients is one way we can continue to improve our services. Please take a few minutes to complete the written survey that you may receive in the mail after your visit with us. Thank you!             Your Updated Medication List - Protect others around you: Learn how to safely use, store and throw away your medicines at www.disposemymeds.org.          This list is accurate as of: 9/11/17  2:24 PM.  Always use your most recent med list.                   Brand Name Dispense Instructions for use Diagnosis    beclomethasone 80 MCG/ACT Inhaler    QVAR    1 Inhaler    Inhale 2 puffs into the lungs 2 times daily    Mild intermittent asthma without complication       ciclopirox 8 % Soln     6.6 mL    Apply thin layer to toes daily, then remove with alcohol after 7 days. Repeat.    Onychomycosis       losartan 25 MG tablet    COZAAR    90 tablet    Take 1 tablet (25 mg) by mouth daily    Essential hypertension with goal blood pressure less than 140/90       MULTIVITAMIN Liqd           OMEGA-3 FISH OIL PO      Take by mouth daily        TRANSDERM-SCOP (1.5 MG) 72 hr patch   Generic drug:  scopolamine      Place 1 patch onto the skin as needed        UNABLE TO FIND      MEDICATION NAME: OPC-3

## 2017-09-11 NOTE — NURSING NOTE
"Chief Complaint   Patient presents with     Hypertension     Asthma       Initial LMP 03/24/2006 Estimated body mass index is 22.12 kg/(m^2) as calculated from the following:    Height as of 4/11/17: 5' 5.5\" (1.664 m).    Weight as of 8/11/17: 135 lb (61.2 kg).  Medication Reconciliation: complete   Zeenat Berumen CMA      "
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
## 2017-09-12 ENCOUNTER — TELEPHONE (OUTPATIENT)
Dept: FAMILY MEDICINE | Facility: CLINIC | Age: 57
End: 2017-09-12

## 2017-09-12 LAB
ANION GAP SERPL CALCULATED.3IONS-SCNC: 7 MMOL/L (ref 3–14)
BUN SERPL-MCNC: 11 MG/DL (ref 7–30)
CALCIUM SERPL-MCNC: 8.8 MG/DL (ref 8.5–10.1)
CHLORIDE SERPL-SCNC: 103 MMOL/L (ref 94–109)
CO2 SERPL-SCNC: 29 MMOL/L (ref 20–32)
CREAT SERPL-MCNC: 0.76 MG/DL (ref 0.52–1.04)
GFR SERPL CREATININE-BSD FRML MDRD: 78 ML/MIN/1.7M2
GLUCOSE SERPL-MCNC: 131 MG/DL (ref 70–99)
POTASSIUM SERPL-SCNC: 3.5 MMOL/L (ref 3.4–5.3)
SODIUM SERPL-SCNC: 139 MMOL/L (ref 133–144)

## 2017-09-12 ASSESSMENT — ASTHMA QUESTIONNAIRES: ACT_TOTALSCORE: 24

## 2018-01-03 DIAGNOSIS — C25.1 MALIGNANT NEOPLASM OF BODY OF PANCREAS (H): Primary | ICD-10-CM

## 2018-01-22 ENCOUNTER — OFFICE VISIT (OUTPATIENT)
Dept: FAMILY MEDICINE | Facility: CLINIC | Age: 58
End: 2018-01-22
Payer: COMMERCIAL

## 2018-01-22 VITALS
HEART RATE: 86 BPM | HEIGHT: 66 IN | BODY MASS INDEX: 21.21 KG/M2 | DIASTOLIC BLOOD PRESSURE: 74 MMHG | SYSTOLIC BLOOD PRESSURE: 128 MMHG | TEMPERATURE: 98.6 F | WEIGHT: 132 LBS | OXYGEN SATURATION: 100 %

## 2018-01-22 DIAGNOSIS — J01.01 ACUTE RECURRENT MAXILLARY SINUSITIS: Primary | ICD-10-CM

## 2018-01-22 DIAGNOSIS — G44.219 EPISODIC TENSION-TYPE HEADACHE, NOT INTRACTABLE: ICD-10-CM

## 2018-01-22 DIAGNOSIS — J34.89 SINUS PRESSURE: ICD-10-CM

## 2018-01-22 DIAGNOSIS — M26.609 TMJ (TEMPOROMANDIBULAR JOINT SYNDROME): ICD-10-CM

## 2018-01-22 DIAGNOSIS — H92.02 LEFT EAR PAIN: ICD-10-CM

## 2018-01-22 PROCEDURE — 99214 OFFICE O/P EST MOD 30 MIN: CPT | Performed by: FAMILY MEDICINE

## 2018-01-22 RX ORDER — AMOXICILLIN 875 MG
875 TABLET ORAL 2 TIMES DAILY
Qty: 20 TABLET | Refills: 0 | Status: SHIPPED | OUTPATIENT
Start: 2018-01-22 | End: 2018-05-21

## 2018-01-22 RX ORDER — FLUTICASONE PROPIONATE 50 MCG
1-2 SPRAY, SUSPENSION (ML) NASAL DAILY
Qty: 1 BOTTLE | Refills: 1 | Status: SHIPPED | OUTPATIENT
Start: 2018-01-22 | End: 2018-05-21

## 2018-01-22 NOTE — MR AVS SNAPSHOT
After Visit Summary   1/22/2018    Kiersten Garibay    MRN: [id number]           Patient Information     Date Of Birth          1960        Visit Information        Provider Department      1/22/2018 8:20 AM Jhon Abbott DO [facility]        Today's Diagnoses     Sinus pressure    -  1    Left ear pain        Episodic tension-type headache, not intractable          Care Instructions    Use flonase  Antibiotics if not resolving  Follow up with ENT if not improving after using mouth guard    Jhon Abbott D.O.            Follow-ups after your visit        Additional Services     OTOLARYNGOLOGY REFERRAL       Your provider has referred you to: FM: [facility] [place] [phone]   [email or web address]    Please be aware that coverage of these services is subject to the terms and limitations of your health insurance plan.  Call member services at your health plan with any benefit or coverage questions.      Please bring the following with you to your appointment:    (1) Any X-Rays, CTs or MRIs which have been performed.  Contact the facility where they were done to arrange for  prior to your scheduled appointment.   (2) List of current medications  (3) This referral request   (4) Any documents/labs given to you for this referral                  Your next 10 appointments already scheduled     Oct 01, 2018 11:30 AM CDT   Lab with  LAB   [facility])    [address] [place] [id number] [phone]            Oct 01, 2018 12:00 PM CDT   (Arrive by 11:45 AM)   CT CHEST ABDOMEN PELVIS W/O & W CONTRAST with CT[address] [place] [address] Imaging [address] ([facility])    [address] [place] [id number] [phone]           Please bring any scans or X-rays taken at other hospitals, if similar tests were done. Also  bring a list of your medicines, including vitamins, minerals and over-the-counter drugs. It is safest to leave personal items at home.  Be sure to tell your doctor:   If you have any allergies.   If there s any chance you are pregnant.   If you are breastfeeding.   If you have any special needs.  You may have contrast for this exam. To prepare:   Do not eat or drink for 2 hours before your exam. If you need to take medicine, you may take it with small sips of water. (We may ask you to take liquid medicine as well.)   The day before your exam, drink extra fluids at least six 8-ounce glasses (unless your doctor tells you to restrict your fluids).  Patients over 70 or patients with diabetes or kidney problems:   If you haven t had a blood test (creatinine test) within the last 30 days, go to your clinic or Diagnostic Imaging Department for this test.  If you have diabetes:   If your kidney function is normal, continue taking your metformin (Avandamet, Glucophage, Glucovance, Metaglip) on the day of your exam.   If your kidney function is abnormal, wait 48 hours before restarting this medicine.  You will have oral contrast for this exam:   You will drink the contrast at home. Get this from your clinic or Diagnostic Imaging Department. Please follow the directions given.  Please wear loose clothing, such as a sweat suit or jogging clothes. Avoid snaps, zippers and other metal. We may ask you to undress and put on a hospital gown.  If you have any questions, please call the Imaging Department where you will have your exam.            Oct 01, 2018  3:00 PM CDT   (Arrive by 2:45 PM)   Return Visit with Jai Parker MD   [facility] ([facility])    [address]  [address]  [facility] [address] [phone]              Who to contact     If you have questions or need follow up information about today's clinic visit or your schedule please contact [facility] directly at [phone].  Normal or non-critical lab and imaging results will be communicated to you by MyChart, letter or phone within 4 business days after the clinic has received the results. If you do not hear from us within 7 days, please contact the clinic through [employer] or phone. If you have a critical or abnormal lab result, we will notify you by phone as soon as possible.  Submit refill requests through [employer] or call your pharmacy and they will forward the refill request to us. Please allow 3 business days for your refill to be completed.          Additional Information About Your Visit        [employer] Information     [employer] gives you secure access to your electronic health record. If you see a primary care provider, you can also send messages to your care team and make appointments. If you have questions, please call your primary care clinic.  If you do not have a primary care provider, please call [phone] and they will assist you.        Care EveryWhere ID     This is your Care EveryWhere ID. This could be used by other organizations to access your [place] medical records  [id number]        Your Vitals Were     Pulse Temperature Height Last Period Pulse Oximetry BMI (Body Mass Index)    86 98.6  F (37  C) (Oral) 5' 5.5\" (1.664 m) 03/24/2006 100% 21.63 kg/m2       Blood Pressure from Last 3 Encounters:   01/22/18 128/74   09/11/17 136/88   08/11/17 (!) 151/96    Weight from Last 3 Encounters:   01/22/18 132 lb (59.9 kg)   09/11/17 133 lb 2 oz (60.4 kg)   08/11/17 135 lb (61.2 kg)              We Performed the Following     OTOLARYNGOLOGY REFERRAL          Today's Medication Changes          These changes are accurate as of: 1/22/18  8:46 AM.  If you have any questions, ask your nurse or doctor.               Start taking these medicines.        Dose/Directions    amoxicillin 875 MG tablet   Commonly known as:  AMOXIL   Used for:  Sinus pressure, Left ear pain, Episodic " tension-type headache, not intractable        Dose:  875 mg   Take 1 tablet (875 mg) by mouth 2 times daily   Quantity:  20 tablet   Refills:  0       fluticasone 50 MCG/ACT spray   Commonly known as:  FLONASE   Used for:  Sinus pressure        Dose:  1-2 spray   Spray 1-2 sprays into both nostrils daily   Quantity:  1 Bottle   Refills:  1         These medicines have changed or have updated prescriptions.        Dose/Directions    beclomethasone 80 MCG/ACT Inhaler   Commonly known as:  QVAR   This may have changed:    - when to take this  - reasons to take this   Used for:  Mild intermittent asthma without complication        Dose:  2 puff   Inhale 2 puffs into the lungs 2 times daily   Quantity:  1 Inhaler   Refills:  1            Where to get your medicines      These medications were sent to [facility] - [address].  [address] [place] [address]., [place] [id number]     Phone:  [phone]     amoxicillin 875 MG tablet    fluticasone 50 MCG/ACT spray                Primary Care Provider Office Phone # Fax #    Barak Childress [phone]       [address] [place], [place] [id number]        Equal Access to Services     [facility] HospitalJOANA : Hadii cj guevara hadasho Sokellieali, waaxda luqadaha, qaybta kaalmada adeegyada, leonard buckner. So [facility] [phone].    ATENCIÓN: Si habla español, tiene a king disposición servicios gratuitos de asistencia lingüística. Llame al [phone].    We comply with applicable federal civil rights laws and [place] laws. We do not discriminate on the basis of race, color, national origin, age, disability, sex, sexual orientation, or gender identity.            Thank you!     Thank you for choosing [facility]  for your care. Our goal is always to provide you with excellent care. Hearing back from our patients is one way we can continue to improve our services. Please take a few  minutes to complete the written survey that you may receive in the mail after your visit with us. Thank you!             Your Updated Medication List - Protect others around you: Learn how to safely use, store and throw away your medicines at www.disposemymeds.org.          This list is accurate as of: 1/22/18  8:46 AM.  Always use your most recent med list.                   Brand Name Dispense Instructions for use Diagnosis    amoxicillin 875 MG tablet    AMOXIL    20 tablet    Take 1 tablet (875 mg) by mouth 2 times daily    Sinus pressure, Left ear pain, Episodic tension-type headache, not intractable       beclomethasone 80 MCG/ACT Inhaler    QVAR    1 Inhaler    Inhale 2 puffs into the lungs 2 times daily    Mild intermittent asthma without complication       ciclopirox 8 % Soln     6.6 mL    Apply thin layer to toes daily, then remove with alcohol after 7 days. Repeat.    Onychomycosis       fluticasone 50 MCG/ACT spray    FLONASE    1 Bottle    Spray 1-2 sprays into both nostrils daily    Sinus pressure       losartan 25 MG tablet    COZAAR    90 tablet    Take 1 tablet (25 mg) by mouth daily    Essential hypertension with goal blood pressure less than 140/90       MULTIVITAMIN Liqd           OMEGA-3 FISH OIL PO      Take by mouth daily        TRANSDERM-SCOP (1.5 MG) 72 hr patch   Generic drug:  scopolamine      Place 1 patch onto the skin as needed        UNABLE TO FIND      MEDICATION NAME: OPC-3

## 2018-01-22 NOTE — PATIENT INSTRUCTIONS
Use flonase  Antibiotics if not resolving  Follow up with ENT if not improving after using mouth guard    Jhon Abbott D.O.
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
## 2018-03-28 DIAGNOSIS — R73.9 ELEVATED BLOOD SUGAR: ICD-10-CM

## 2018-03-28 LAB
GLUCOSE SERPL-MCNC: 107 MG/DL (ref 70–99)
HBA1C MFR BLD: 5.4 % (ref 0–5.7)

## 2018-03-28 PROCEDURE — 36415 COLL VENOUS BLD VENIPUNCTURE: CPT | Performed by: INTERNAL MEDICINE

## 2018-03-28 PROCEDURE — 83036 HEMOGLOBIN GLYCOSYLATED A1C: CPT | Performed by: INTERNAL MEDICINE

## 2018-03-28 PROCEDURE — 82947 ASSAY GLUCOSE BLOOD QUANT: CPT | Performed by: INTERNAL MEDICINE

## 2018-04-05 ENCOUNTER — RADIANT APPOINTMENT (OUTPATIENT)
Dept: MAMMOGRAPHY | Facility: CLINIC | Age: 58
End: 2018-04-05
Payer: COMMERCIAL

## 2018-04-05 DIAGNOSIS — N64.4 BREAST PAIN, RIGHT: ICD-10-CM

## 2018-05-21 ENCOUNTER — OFFICE VISIT (OUTPATIENT)
Dept: FAMILY MEDICINE | Facility: CLINIC | Age: 58
End: 2018-05-21
Payer: COMMERCIAL

## 2018-05-21 VITALS
HEIGHT: 66 IN | OXYGEN SATURATION: 99 % | DIASTOLIC BLOOD PRESSURE: 84 MMHG | TEMPERATURE: 98.2 F | BODY MASS INDEX: 20.96 KG/M2 | WEIGHT: 130.4 LBS | SYSTOLIC BLOOD PRESSURE: 138 MMHG | HEART RATE: 65 BPM

## 2018-05-21 DIAGNOSIS — I10 HYPERTENSION GOAL BP (BLOOD PRESSURE) < 140/90: ICD-10-CM

## 2018-05-21 DIAGNOSIS — Z13.220 SCREENING, LIPID: ICD-10-CM

## 2018-05-21 DIAGNOSIS — I10 ESSENTIAL HYPERTENSION WITH GOAL BLOOD PRESSURE LESS THAN 140/90: ICD-10-CM

## 2018-05-21 DIAGNOSIS — J45.20 MILD INTERMITTENT ASTHMA WITHOUT COMPLICATION: ICD-10-CM

## 2018-05-21 DIAGNOSIS — R53.83 OTHER FATIGUE: ICD-10-CM

## 2018-05-21 DIAGNOSIS — F41.0 ANXIETY ATTACK: ICD-10-CM

## 2018-05-21 DIAGNOSIS — Z00.01 ENCOUNTER FOR ROUTINE ADULT HEALTH EXAMINATION WITH ABNORMAL FINDINGS: ICD-10-CM

## 2018-05-21 DIAGNOSIS — R06.83 SNORING: ICD-10-CM

## 2018-05-21 DIAGNOSIS — R07.89 CHEST PRESSURE: ICD-10-CM

## 2018-05-21 DIAGNOSIS — Z11.4 ENCOUNTER FOR SPECIAL SCREENING EXAMINATION FOR HIV: ICD-10-CM

## 2018-05-21 DIAGNOSIS — R42 DIZZINESS: Primary | ICD-10-CM

## 2018-05-21 LAB
BASOPHILS # BLD AUTO: 0 10E9/L (ref 0–0.2)
BASOPHILS NFR BLD AUTO: 0.4 %
DIFFERENTIAL METHOD BLD: NORMAL
EOSINOPHIL # BLD AUTO: 0.1 10E9/L (ref 0–0.7)
EOSINOPHIL NFR BLD AUTO: 1.5 %
ERYTHROCYTE [DISTWIDTH] IN BLOOD BY AUTOMATED COUNT: 12.8 % (ref 10–15)
HCT VFR BLD AUTO: 39.1 % (ref 35–47)
HGB BLD-MCNC: 13 G/DL (ref 11.7–15.7)
LYMPHOCYTES # BLD AUTO: 1.5 10E9/L (ref 0.8–5.3)
LYMPHOCYTES NFR BLD AUTO: 33.1 %
MCH RBC QN AUTO: 29.6 PG (ref 26.5–33)
MCHC RBC AUTO-ENTMCNC: 33.2 G/DL (ref 31.5–36.5)
MCV RBC AUTO: 89 FL (ref 78–100)
MONOCYTES # BLD AUTO: 0.4 10E9/L (ref 0–1.3)
MONOCYTES NFR BLD AUTO: 7.9 %
NEUTROPHILS # BLD AUTO: 2.6 10E9/L (ref 1.6–8.3)
NEUTROPHILS NFR BLD AUTO: 57.1 %
PLATELET # BLD AUTO: 239 10E9/L (ref 150–450)
RBC # BLD AUTO: 4.39 10E12/L (ref 3.8–5.2)
WBC # BLD AUTO: 4.6 10E9/L (ref 4–11)

## 2018-05-21 PROCEDURE — 99396 PREV VISIT EST AGE 40-64: CPT | Performed by: FAMILY MEDICINE

## 2018-05-21 PROCEDURE — 93000 ELECTROCARDIOGRAM COMPLETE: CPT | Performed by: FAMILY MEDICINE

## 2018-05-21 PROCEDURE — 85025 COMPLETE CBC W/AUTO DIFF WBC: CPT | Performed by: FAMILY MEDICINE

## 2018-05-21 PROCEDURE — 36415 COLL VENOUS BLD VENIPUNCTURE: CPT | Performed by: FAMILY MEDICINE

## 2018-05-21 PROCEDURE — 84443 ASSAY THYROID STIM HORMONE: CPT | Performed by: FAMILY MEDICINE

## 2018-05-21 PROCEDURE — 99214 OFFICE O/P EST MOD 30 MIN: CPT | Mod: 25 | Performed by: FAMILY MEDICINE

## 2018-05-21 NOTE — PATIENT INSTRUCTIONS
Monitor blood pressure and increase Losartan to one tablet and half. If blood pressures remain elevated, increase Losartan to two tablets.     Stress test is recommended. Please complete as discussed.     Sleep study is recommended if continuing snoring and day time fatigue.       Labs today. I will notify you of results when I receive them. Future fasting labs are ordered. Please complete fasting labs prior to follow up visit.     Follow up in three to four weeks with blood pressure machine for recheck on blood pressure and chest pressure.     If chest symptoms persist or worsens follow up at the Emergency Department        Preventive Health Recommendations  Female Ages 50 - 64    Yearly exam: See your health care provider every year in order to  o Review health changes.   o Discuss preventive care.    o Review your medicines if your doctor has prescribed any.      Get a Pap test every three years (unless you have an abnormal result and your provider advises testing more often).    If you get Pap tests with HPV test, you only need to test every 5 years, unless you have an abnormal result.     You do not need a Pap test if your uterus was removed (hysterectomy) and you have not had cancer.    You should be tested each year for STDs (sexually transmitted diseases) if you're at risk.     Have a mammogram every 1 to 2 years.    Have a colonoscopy at age 50, or have a yearly FIT test (stool test). These exams screen for colon cancer.      Have a cholesterol test every 5 years, or more often if advised.    Have a diabetes test (fasting glucose) every three years. If you are at risk for diabetes, you should have this test more often.     If you are at risk for osteoporosis (brittle bone disease), think about having a bone density scan (DEXA).    Shots: Get a flu shot each year. Get a tetanus shot every 10 years.    Nutrition:     Eat at least 5 servings of fruits and vegetables each day.    Eat whole-grain bread,  whole-wheat pasta and brown rice instead of white grains and rice.    Talk to your provider about Calcium and Vitamin D.     Lifestyle    Exercise at least 150 minutes a week (30 minutes a day, 5 days a week). This will help you control your weight and prevent disease.    Limit alcohol to one drink per day.    No smoking.     Wear sunscreen to prevent skin cancer.     See your dentist every six months for an exam and cleaning.    See your eye doctor every 1 to 2 years.  [facility]   Discharged by : Julee Box [facility]  0Paper scripts provided to patient : no     If you have any questions regarding your visit please contact your care team:     Team [employer]                Clinic Hours Telephone Number     Dr. Tiffani Bell, NP 7am-7pm  Monday - Thursday   7am-5pm  Fridays  [phone]   (Appointment scheduling available 24/7)     RN Line  [phone] option 2     Urgent Care - [place] [facility] - 11am-9pm Monday-Friday Saturday-Sunday- 9am-5pm     [place] -   5pm-9pm Monday-Friday Saturday-Sunday- 9am-5pm    [phone] - [place]    [phone] - [place]       For a Price Quote for your services, please call our Consumer Price Line at [phone].     What options do I have for visits at the clinic other than the traditional office visit?     To expand how we care for you, many of our providers are utilizing electronic visits (e-visits) and telephone visits, when medically appropriate, for interactions with their patients rather than a visit in the clinic. We also offer nurse visits for many medical concerns. Just like any other service, we will bill your insurance company for this type of visit based on time spent on the phone with your provider. Not all insurance companies cover these visits. Please check with your medical insurance if this type of visit is covered. You will be  responsible for any charges that are not paid by your insurance.   E-visits via [employer]: generally incur a $35.00 fee.     Telephone visits:  Time spent on the phone: *charged based on time that is spent on the phone in increments of 10 minutes. Estimated cost:   5-10 mins $30.00   11-20 mins. $59.00   21-30 mins. $85.00       Use [employer] (secure email communication and access to your chart) to send your primary care provider a message or make an appointment. Ask someone on your Team how to sign up for [employer].     As always, Thank you for trusting us with your health care needs!      [facility]:    Scheduling Appointments  [facility]  Call: [phone]    [facility]  Call: [phone]    [facility]  Call: [phone]    For Gastroenterology referrals   [facility]   Clinics and Surgery Center, 4th Floor   [address] [place] [address]   Appointments: [phone]    WHERE TO GO FOR CARE?  Clinic    Make an appointment if you:       Are sick (cold, cough, flu, sore throat, earache or in pain).       Have a small injury (sprain, small cut, burn or broken bone).       Need a physical exam, Pap smear, vaccine or prescription refill.       Have questions about your health or medicines.    To reach us:      Call [phone] ([phone]). Open 24 hours every day. (For counseling services, call [phone].)    Log into [employer] at [email or web address]. (Visit [email or web address] to create an account.) Hospital emergency room    An emergency is a serious or life- threatening problem that must be treated right away.    Call 930 or get to the hospital if you have:      Very bad or sudden:            - Chest pain or pressure         - Bleeding         - Head or belly pain         - Dizziness or trouble seeing, walking or                          Speaking      Problems breathing      Blood in  your vomit or you are coughing up blood      A major injury (knocked out, loss of a finger or limb, rape, broken bone protruding from skin)    A mental health crisis. (Or call the Mental Health Crisis line at 1-899.835.3457 or Suicide Prevention Hotline at 1-976.526.9331.)    Open 24 hours every day. You don't need an appointment.     Urgent care    Visit urgent care for sickness or small injuries when the clinic is closed. You don't need an appointment. To check hours or find an urgent care near you, visit [email or web address]. Online care    Get online care from [facility] for more than 70 common problems, like colds, allergies and infections. Open 24 hours every day at:   [email or web address]   Need help deciding?    For advice about where to be seen, you may call your clinic and ask to speak with a nurse. We're here for you 24 hours every day.         If you are deaf or hard of hearing, please let us know. We provide many free services including sign language interpreters, oral interpreters, TTYs, telephone amplifiers, note takers and written materials.
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
## 2018-05-21 NOTE — MR AVS SNAPSHOT
After Visit Summary   5/21/2018    Kiersten Garibay    MRN: [id number]           Patient Information     Date Of Birth          1960        Visit Information        Provider Department      5/21/2018 11:40 AM Jhon Abbott DO [facility]        Today's Diagnoses     Dizziness    -  1    Encounter for routine adult health examination with abnormal findings        Chest pressure        Mild intermittent asthma without complication        Hypertension goal BP (blood pressure) < 140/90        Essential hypertension with goal blood pressure less than 140/90        Anxiety attack        Snoring        Other fatigue        Screening, lipid          Care Instructions    Monitor blood pressure and increase Losartan to one tablet and half. If blood pressures remain elevated, increase Losartan to two tablets.     Stress test is recommended. Please complete as discussed.     Sleep study is recommended if continuing snoring and day time fatigue.       Labs today. I will notify you of results when I receive them. Future fasting labs are ordered. Please complete fasting labs prior to follow up visit.     Follow up in three to four weeks with blood pressure machine for recheck on blood pressure and chest pressure.     If chest symptoms persist or worsens follow up at the Emergency Department        Preventive Health Recommendations  Female Ages 50 - 64    Yearly exam: See your health care provider every year in order to  o Review health changes.   o Discuss preventive care.    o Review your medicines if your doctor has prescribed any.      Get a Pap test every three years (unless you have an abnormal result and your provider advises testing more often).    If you get Pap tests with HPV test, you only need to test every 5 years, unless you have an abnormal result.     You do not need a Pap test if your uterus was removed (hysterectomy) and you have not had cancer.    You should be  tested each year for STDs (sexually transmitted diseases) if you're at risk.     Have a mammogram every 1 to 2 years.    Have a colonoscopy at age 50, or have a yearly FIT test (stool test). These exams screen for colon cancer.      Have a cholesterol test every 5 years, or more often if advised.    Have a diabetes test (fasting glucose) every three years. If you are at risk for diabetes, you should have this test more often.     If you are at risk for osteoporosis (brittle bone disease), think about having a bone density scan (DEXA).    Shots: Get a flu shot each year. Get a tetanus shot every 10 years.    Nutrition:     Eat at least 5 servings of fruits and vegetables each day.    Eat whole-grain bread, whole-wheat pasta and brown rice instead of white grains and rice.    Talk to your provider about Calcium and Vitamin D.     Lifestyle    Exercise at least 150 minutes a week (30 minutes a day, 5 days a week). This will help you control your weight and prevent disease.    Limit alcohol to one drink per day.    No smoking.     Wear sunscreen to prevent skin cancer.     See your dentist every six months for an exam and cleaning.    See your eye doctor every 1 to 2 years.  [facility]   [facility] by [facility]  0Paper scripts provided to patient : no     If you have any questions regarding your visit please contact your care team:     Team Gold                Clinic Hours Telephone Number     Dr. Tiffani Bell NP 7am-7pm  Monday - Thursday   7am-5pm  Fridays  [phone]   (Appointment scheduling available 24/7)     RN Line  [phone] option 2     Urgent Care - [place] Guerra and [place] Guerra - 11am-9pm Monday-Friday Saturday-Sunday- 9am-5pm     [place] -   5pm-9pm Monday-Friday Saturday-Sunday- 9am-5pm    [phone] - Laney Guerra    [phone] - Rosa       For a Price Quote for  your services, please call our Consumer Price Line at [phone].     What options do I have for visits at the clinic other than the traditional office visit?     To expand how we care for you, many of our providers are utilizing electronic visits (e-visits) and telephone visits, when medically appropriate, for interactions with their patients rather than a visit in the clinic. We also offer nurse visits for many medical concerns. Just like any other service, we will bill your insurance company for this type of visit based on time spent on the phone with your provider. Not all insurance companies cover these visits. Please check with your medical insurance if this type of visit is covered. You will be responsible for any charges that are not paid by your insurance.   E-visits via Medialets: generally incur a $35.00 fee.     Telephone visits:  Time spent on the phone: *charged based on time that is spent on the phone in increments of 10 minutes. Estimated cost:   5-10 mins $30.00   11-20 mins. $59.00   21-30 mins. $85.00       Use Medialets (secure email communication and access to your chart) to send your primary care provider a message or make an appointment. Ask someone on your Team how to sign up for Medialets.     As always, Thank you for trusting us with your health care needs!      [facility]:    Scheduling Appointments  [place], [facility]  Call: [phone]    [facility]  Call: [phone]    [facility]  Call: [phone]    For Gastroenterology referrals   [facility]   Clinics and Surgery Center, 4th Floor   [address] [place] [address]   Appointments: [phone]    WHERE TO GO FOR CARE?  Clinic    Make an appointment if you:       Are sick (cold, cough, flu, sore throat, earache or in pain).       Have a small injury (sprain, small cut, burn or broken bone).       Need a physical exam, Pap  smear, vaccine or prescription refill.       Have questions about your health or medicines.    To reach us:      Call [phone] ([phone]). Open 24 hours every day. (For counseling services, call [phone].)    Log into Futurederm at [employer]. (Visit [email or web address][email or web address] to create an account.) Hospital emergency room    An emergency is a serious or life- threatening problem that must be treated right away.    Call 911 or get to the hospital if you have:      Very bad or sudden:            - Chest pain or pressure         - Bleeding         - Head or belly pain         - Dizziness or trouble seeing, walking or                          Speaking      Problems breathing      Blood in your vomit or you are coughing up blood      A major injury (knocked out, loss of a finger or limb, rape, broken bone protruding from skin)    A mental health crisis. (Or call the Mental Health Crisis line at 1-492.843.7328 or Suicide Prevention Hotline at 1-387.849.3181.)    Open 24 hours every day. You don't need an appointment.     Urgent care    Visit urgent care for sickness or small injuries when the clinic is closed. You don't need an appointment. To check hours or find an urgent care near you, visit [email or web address]. Online care    Get online care from [facility] for more than 70 common problems, like colds, allergies and infections. Open 24 hours every day at:   [email or web address]   Need help deciding?    For advice about where to be seen, you may call your clinic and ask to speak with a nurse. We're here for you 24 hours every day.         If you are deaf or hard of hearing, please let us know. We provide many free services including sign language interpreters, oral interpreters, TTYs, telephone amplifiers, note takers and written materials.                   Follow-ups after your visit        Your next 10 appointments already scheduled     Oct 01, 2018 11:30 AM ADDY   Lab with  LAB    [facility])    [address] [place] [id number]   [phone]            Oct 01, 2018 12:00 PM CDT   CT CHEST ABDOMEN PELVIS W/O & W CONTRAST with UCCT1   [facility] CT ([facility])    [address] [place] [id number]   [phone]           Please bring any scans or X-rays taken at other hospitals, if similar tests were done. Also bring a list of your medicines, including vitamins, minerals and over-the-counter drugs. It is safest to leave personal items at home.  Be sure to tell your doctor:   If you have any allergies.   If there s any chance you are pregnant.   If you are breastfeeding.  How to prepare:   Do not eat or drink for 2 hours before your exam. If you need to take medicine, you may take it with small sips of water. (We may ask you to take liquid medicine as well.)   Please wear loose clothing, such as a sweat suit or jogging clothes. Avoid snaps, zippers and other metal. We may ask you to undress and put on a hospital gown.  Please arrive 30 minutes early for your CT. Once in the department you might be asked to drink water 15-20 minutes prior to your exam.  If indicated you may be asked to drink an oral contrast in advance of your CT.  If this is the case, the imaging team will let you know or be in contact with you prior to your appointment  Patients over 70 or patients with diabetes or kidney problems:   If you haven t had a blood test (creatinine test) within the last 30 days, the Cardiologist/Radiologist may require you to get this test prior to your exam.  If you have diabetes:   Continue to take your metformin medication on the day of your exam  If you have any questions, please call the Imaging Department where you will have your exam.            Oct 01, 2018  3:00 PM CDT   (Arrive by 2:45 PM)   Return Visit with Jai Parker MD   [facility] ([facility])    [address]  [address]  [facility] [address] [phone]              Future tests that were ordered for you today     Open Future Orders        Priority Expected Expires Ordered    Lipid panel reflex to direct LDL Fasting Routine  7/21/2018 5/21/2018    Basic metabolic panel Routine  7/21/2018 5/21/2018    Exercise Stress Echocardiogram Routine  5/21/2019 5/21/2018            Who to contact     If you have questions or need follow up information about today's clinic visit or your schedule please contact [facility] directly at [phone].  Normal or non-critical lab and imaging results will be communicated to you by MuteButtonhart, letter or phone within 4 business days after the clinic has received the results. If you do not hear from us within 7 days, please contact the clinic through Quantinet or phone. If you have a critical or abnormal lab result, we will notify you by phone as soon as possible.  Submit refill requests through [employer] or call your pharmacy and they will forward the refill request to us. Please allow 3 business days for your refill to be completed.          Additional Information About Your Visit        MuteButtonhart Information     [employer] gives you secure access to your electronic health record. If you see a primary care provider, you can also send messages to your care team and make appointments. If you have questions, please call your primary care clinic.  If you do not have a primary care provider, please call [phone] and they will assist you.        Care EveryWhere ID     This is your Care EveryWhere ID. This could be used by other organizations to access your [place] medical records  [id number]        Your Vitals Were     Pulse Temperature Height Last Period Pulse Oximetry BMI (Body Mass Index)    65 98.2  F (36.8  C) (Oral) 5' 5.5\" (1.664 m) 03/24/2006 99% 21.37 kg/m2       Blood Pressure from Last 3 Encounters:   05/21/18 138/84 "   01/22/18 128/74   09/11/17 136/88    Weight from Last 3 Encounters:   05/21/18 130 lb 6.4 oz (59.1 kg)   01/22/18 132 lb (59.9 kg)   09/11/17 133 lb 2 oz (60.4 kg)              We Performed the Following     CBC with platelets and differential     EKG 12-lead complete w/read - Clinics     TSH with free T4 reflex          Today's Medication Changes          These changes are accurate as of 5/21/18 12:52 PM.  If you have any questions, ask your nurse or doctor.               These medicines have changed or have updated prescriptions.        Dose/Directions    beclomethasone 80 MCG/ACT Inhaler   Commonly known as:  QVAR   This may have changed:    - when to take this  - reasons to take this   Used for:  Mild intermittent asthma without complication        Dose:  2 puff   Inhale 2 puffs into the lungs 2 times daily   Quantity:  1 Inhaler   Refills:  1                Primary Care Provider Office Phone # Fax #    Barak Childress [phone]       [facility] [address]        Equal Access to Services     [facility]: Hadii cj guevara hadasho Somoncho, waaxda luqadaha, qaybta kaalmada adeaden, leonard villeda . So [facility] [phone].    ATENCIÓN: Si habla español, tiene a king disposición servicios gratuitos de asistencia lingüística. Llame al [phone].    We comply with applicable federal civil rights laws and [place] laws. We do not discriminate on the basis of race, color, national origin, age, disability, sex, sexual orientation, or gender identity.            Thank you!     Thank you for choosing [facility]  for your care. Our goal is always to provide you with excellent care. Hearing back from our patients is one way we can continue to improve our services. Please take a few minutes to complete the written survey that you may receive in the mail after your visit with us. Thank you!             Your Updated Medication List -  Protect others around you: Learn how to safely use, store and throw away your medicines at www.disposemymeds.org.          This list is accurate as of 5/21/18 12:52 PM.  Always use your most recent med list.                   Brand Name Dispense Instructions for use Diagnosis    beclomethasone 80 MCG/ACT Inhaler    QVAR    1 Inhaler    Inhale 2 puffs into the lungs 2 times daily    Mild intermittent asthma without complication       ciclopirox 8 % Soln     6.6 mL    Apply thin layer to toes daily, then remove with alcohol after 7 days. Repeat.    Onychomycosis       losartan 25 MG tablet    COZAAR    90 tablet    Take 1 tablet (25 mg) by mouth daily    Essential hypertension with goal blood pressure less than 140/90       MULTIVITAMIN Liqd           UNABLE TO FIND      MEDICATION NAME: OPC-3

## 2018-05-22 LAB — TSH SERPL DL<=0.005 MIU/L-ACNC: 1.2 MU/L (ref 0.4–4)

## 2018-05-22 ASSESSMENT — ASTHMA QUESTIONNAIRES: ACT_TOTALSCORE: 22

## 2018-05-22 NOTE — PROGRESS NOTES
All your results are essentially brenden. Please contact me if you have any questions.  Take care,  Jhon Abbott D.O.

## 2018-06-05 ENCOUNTER — RADIANT APPOINTMENT (OUTPATIENT)
Dept: CARDIOLOGY | Facility: CLINIC | Age: 58
End: 2018-06-05
Attending: FAMILY MEDICINE
Payer: COMMERCIAL

## 2018-06-05 DIAGNOSIS — R42 DIZZINESS: ICD-10-CM

## 2018-06-05 DIAGNOSIS — R07.89 CHEST PRESSURE: ICD-10-CM

## 2018-06-05 PROCEDURE — 93325 DOPPLER ECHO COLOR FLOW MAPG: CPT | Mod: TC | Performed by: INTERNAL MEDICINE

## 2018-06-05 PROCEDURE — 93321 DOPPLER ECHO F-UP/LMTD STD: CPT | Mod: TC | Performed by: INTERNAL MEDICINE

## 2018-06-05 PROCEDURE — 93352 ADMIN ECG CONTRAST AGENT: CPT | Performed by: INTERNAL MEDICINE

## 2018-06-05 PROCEDURE — 93017 CV STRESS TEST TRACING ONLY: CPT | Performed by: INTERNAL MEDICINE

## 2018-06-05 PROCEDURE — 93350 STRESS TTE ONLY: CPT | Mod: 26 | Performed by: INTERNAL MEDICINE

## 2018-06-05 PROCEDURE — 93018 CV STRESS TEST I&R ONLY: CPT | Performed by: INTERNAL MEDICINE

## 2018-06-05 PROCEDURE — 93321 DOPPLER ECHO F-UP/LMTD STD: CPT | Mod: 26 | Performed by: INTERNAL MEDICINE

## 2018-06-05 PROCEDURE — 93325 DOPPLER ECHO COLOR FLOW MAPG: CPT | Mod: 26 | Performed by: INTERNAL MEDICINE

## 2018-06-05 PROCEDURE — 40000264 ECHO STRESS WITH OPTISON: Performed by: INTERNAL MEDICINE

## 2018-06-05 PROCEDURE — 93350 STRESS TTE ONLY: CPT | Mod: TC | Performed by: INTERNAL MEDICINE

## 2018-06-05 PROCEDURE — 93016 CV STRESS TEST SUPVJ ONLY: CPT | Performed by: INTERNAL MEDICINE

## 2018-06-05 RX ADMIN — Medication 3 ML: at 15:15

## 2018-06-05 NOTE — NURSING NOTE
Bicycle Stress Echocardiogram with Optison performed.  IV started in Left Cephalic vein.    Optison:   3ml Optison mixed with 6ml Saline - [id number]  Amount used: 3.0ml, 6.0ml wasted

## 2018-06-12 DIAGNOSIS — Z11.4 ENCOUNTER FOR SPECIAL SCREENING EXAMINATION FOR HIV: ICD-10-CM

## 2018-06-12 DIAGNOSIS — I10 HYPERTENSION GOAL BP (BLOOD PRESSURE) < 140/90: ICD-10-CM

## 2018-06-12 DIAGNOSIS — Z00.01 ENCOUNTER FOR ROUTINE ADULT HEALTH EXAMINATION WITH ABNORMAL FINDINGS: ICD-10-CM

## 2018-06-12 DIAGNOSIS — Z13.220 SCREENING, LIPID: ICD-10-CM

## 2018-06-12 DIAGNOSIS — R42 DIZZINESS: ICD-10-CM

## 2018-06-12 LAB
ANION GAP SERPL CALCULATED.3IONS-SCNC: 10 MMOL/L (ref 3–14)
BUN SERPL-MCNC: 14 MG/DL (ref 7–30)
CALCIUM SERPL-MCNC: 9.5 MG/DL (ref 8.5–10.1)
CHLORIDE SERPL-SCNC: 106 MMOL/L (ref 94–109)
CHOLEST SERPL-MCNC: 219 MG/DL
CO2 SERPL-SCNC: 27 MMOL/L (ref 20–32)
CREAT SERPL-MCNC: 0.87 MG/DL (ref 0.52–1.04)
GFR SERPL CREATININE-BSD FRML MDRD: 67 ML/MIN/1.7M2
GLUCOSE SERPL-MCNC: 102 MG/DL (ref 70–99)
HDLC SERPL-MCNC: 102 MG/DL
HIV 1+2 AB+HIV1 P24 AG SERPL QL IA: NONREACTIVE
LDLC SERPL CALC-MCNC: 95 MG/DL
NONHDLC SERPL-MCNC: 117 MG/DL
POTASSIUM SERPL-SCNC: 3.9 MMOL/L (ref 3.4–5.3)
SODIUM SERPL-SCNC: 143 MMOL/L (ref 133–144)
TRIGL SERPL-MCNC: 112 MG/DL

## 2018-06-12 PROCEDURE — 80048 BASIC METABOLIC PNL TOTAL CA: CPT | Performed by: INTERNAL MEDICINE

## 2018-06-12 PROCEDURE — 36415 COLL VENOUS BLD VENIPUNCTURE: CPT | Performed by: INTERNAL MEDICINE

## 2018-06-12 PROCEDURE — 87389 HIV-1 AG W/HIV-1&-2 AB AG IA: CPT | Performed by: INTERNAL MEDICINE

## 2018-06-12 PROCEDURE — 80061 LIPID PANEL: CPT | Performed by: INTERNAL MEDICINE

## 2018-06-18 ENCOUNTER — OFFICE VISIT (OUTPATIENT)
Dept: FAMILY MEDICINE | Facility: CLINIC | Age: 58
End: 2018-06-18
Payer: COMMERCIAL

## 2018-06-18 ENCOUNTER — TELEPHONE (OUTPATIENT)
Dept: FAMILY MEDICINE | Facility: CLINIC | Age: 58
End: 2018-06-18

## 2018-06-18 VITALS
SYSTOLIC BLOOD PRESSURE: 138 MMHG | BODY MASS INDEX: 21.05 KG/M2 | HEIGHT: 66 IN | TEMPERATURE: 98 F | WEIGHT: 131 LBS | DIASTOLIC BLOOD PRESSURE: 80 MMHG | HEART RATE: 64 BPM

## 2018-06-18 DIAGNOSIS — I10 HYPERTENSION GOAL BP (BLOOD PRESSURE) < 140/90: Primary | ICD-10-CM

## 2018-06-18 DIAGNOSIS — J45.20 MILD INTERMITTENT ASTHMA WITHOUT COMPLICATION: ICD-10-CM

## 2018-06-18 PROCEDURE — 99214 OFFICE O/P EST MOD 30 MIN: CPT | Performed by: FAMILY MEDICINE

## 2018-06-18 RX ORDER — LOSARTAN POTASSIUM 50 MG/1
50 TABLET ORAL DAILY
Qty: 90 TABLET | Refills: 3 | Status: SHIPPED | OUTPATIENT
Start: 2018-06-18 | End: 2019-06-06

## 2018-06-18 NOTE — MR AVS SNAPSHOT
"              After Visit Summary   6/18/2018    Kiersten Garibay    MRN: [id number]           Patient Information     Date Of Birth          1960        Visit Information        Provider Department      6/18/2018 11:20 AM Barak Childress MD [facility]        Today's Diagnoses     Hypertension goal BP (blood pressure) < 140/90    -  1    Mild intermittent asthma without complication          Care Instructions    Orders Placed This Encounter     Asthma Action Plan (AAP)     Look for AAP Text in Letters (\"Asthma Action Plan\")     losartan (COZAAR) 50 MG tablet     Sig: Take 1 tablet (50 mg) by mouth daily     Dispense:  90 tablet     Refill:  3     beclomethasone (QVAR) 80 MCG/ACT Inhaler     Sig: Inhale 2 puffs into the lungs 2 times daily     Dispense:  1 Inhaler     Refill:  3               Follow-ups after your visit        Your next 10 appointments already scheduled     Oct 01, 2018 11:30 AM CDT   Lab with  LAB   [facility] ([facility])    [address] [place] [id number] [phone]            Oct 01, 2018 12:00 PM CDT   CT CHEST ABDOMEN PELVIS W/O & W CONTRAST with UCCT1   [facility] CT ([facility])    [address] [place] [id number] [phone]           Please bring any scans or X-rays taken at other hospitals, if similar tests were done. Also bring a list of your medicines, including vitamins, minerals and over-the-counter drugs. It is safest to leave personal items at home.  Be sure to tell your doctor:   If you have any allergies.   If there s any chance you are pregnant.   If you are breastfeeding.  How to prepare:   Do not eat or drink for 2 hours before your exam. If you need to take medicine, you may take it with small sips of water. (We may ask you to take liquid medicine as well.)   Please wear loose clothing, such as a sweat suit or jogging " clothes. Avoid snaps, zippers and other metal. We may ask you to undress and put on a hospital gown.  Please arrive 30 minutes early for your CT. Once in the department you might be asked to drink water 15-20 minutes prior to your exam.  If indicated you may be asked to drink an oral contrast in advance of your CT.  If this is the case, the imaging team will let you know or be in contact with you prior to your appointment  Patients over 70 or patients with diabetes or kidney problems:   If you haven t had a blood test (creatinine test) within the last 30 days, the Cardiologist/Radiologist may require you to get this test prior to your exam.  If you have diabetes:   Continue to take your metformin medication on the day of your exam  If you have any questions, please call the Imaging Department where you will have your exam.            Oct 01, 2018  3:00 PM CDT   (Arrive by 2:45 PM)   Return Visit with Jai Parker MD   [facility] ([facility])    [address] [place] [address]  [address] [place] [address] [phone]              Who to contact     If you have questions or need follow up information about today's clinic visit or your schedule please contact [facility] directly at [phone].  Normal or non-critical lab and imaging results will be communicated to you by MyChart, letter or phone within 4 business days after the clinic has received the results. If you do not hear from us within 7 days, please contact the clinic through MyChart or phone. If you have a critical or abnormal lab result, we will notify you by phone as soon as possible.  Submit refill requests through [employer] or call your pharmacy and they will forward the refill request to us. Please allow 3 business days for your refill to be completed.          Additional Information About Your Visit        [employer] Information     [employer] gives you secure access to your electronic  "health record. If you see a primary care provider, you can also send messages to your care team and make appointments. If you have questions, please call your primary care clinic.  If you do not have a primary care provider, please call [phone] and they will assist you.        Care EveryWhere ID     This is your Care EveryWhere ID. This could be used by other organizations to access your [place] medical records  [id number]        Your Vitals Were     Pulse Temperature Height Last Period BMI (Body Mass Index)       64 98  F (36.7  C) (Oral) 5' 5.5\" (1.664 m) 03/24/2006 21.47 kg/m2        Blood Pressure from Last 3 Encounters:   06/18/18 156/84   05/21/18 138/84   01/22/18 128/74    Weight from Last 3 Encounters:   06/18/18 131 lb (59.4 kg)   05/21/18 130 lb 6.4 oz (59.1 kg)   01/22/18 132 lb (59.9 kg)              We Performed the Following     Asthma Action Plan (AAP)          Today's Medication Changes          These changes are accurate as of 6/18/18 12:14 PM.  If you have any questions, ask your nurse or doctor.               These medicines have changed or have updated prescriptions.        Dose/Directions    beclomethasone 80 MCG/ACT Inhaler   Commonly known as:  QVAR   This may have changed:    - when to take this  - reasons to take this   Used for:  Mild intermittent asthma without complication        Dose:  2 puff   Inhale 2 puffs into the lungs 2 times daily   Quantity:  1 Inhaler   Refills:  3       * losartan 25 MG tablet   Commonly known as:  COZAAR   This may have changed:  how much to take   Used for:  Essential hypertension with goal blood pressure less than 140/90        Dose:  25 mg   Take 1 tablet (25 mg) by mouth daily   Quantity:  90 tablet   Refills:  3       * losartan 50 MG tablet   Commonly known as:  COZAAR   This may have changed:  You were already taking a medication with the same name, and this prescription was added. Make sure you understand how and when to take each.   Used for: "  Hypertension goal BP (blood pressure) < 140/90   Changed by:  Barak Childress MD        Dose:  50 mg   Take 1 tablet (50 mg) by mouth daily   Quantity:  90 tablet   Refills:  3       * Notice:  This list has 2 medication(s) that are the same as other medications prescribed for you. Read the directions carefully, and ask your doctor or other care provider to review them with you.         Where to get your medicines      These medications were sent to [facility] [address] [place] [address].  [address] [facility] [address]     Phone:  [phone]     beclomethasone 80 MCG/ACT Inhaler    losartan 50 MG tablet                Primary Care Provider Office Phone # Fax #    Barak Childress [phone]       [address] [place] [address]        Equal Access to Services     [facility]: Hadii cj guevara hadasho Soomaali, waaxda luqadaha, qaybta kaalmada adeegyada, leonard martinez haysimi villeda . So [facility] [phone].    ATENCIÓN: Si habla español, tiene a king disposición servicios gratuitos de asistencia lingüística. Llame al [phone].    We comply with applicable federal civil rights laws and [place] laws. We do not discriminate on the basis of race, color, national origin, age, disability, sex, sexual orientation, or gender identity.            Thank you!     Thank you for choosing [facility]  for your care. Our goal is always to provide you with excellent care. Hearing back from our patients is one way we can continue to improve our services. Please take a few minutes to complete the written survey that you may receive in the mail after your visit with us. Thank you!             Your Updated Medication List - Protect others around you: Learn how to safely use, store and throw away your medicines at www.disposemymeds.org.          This list is accurate as of 6/18/18 12:14 PM.  Always use your most  recent med list.                   Brand Name Dispense Instructions for use Diagnosis    beclomethasone 80 MCG/ACT Inhaler    QVAR    1 Inhaler    Inhale 2 puffs into the lungs 2 times daily    Mild intermittent asthma without complication       ciclopirox 8 % Soln     6.6 mL    Apply thin layer to toes daily, then remove with alcohol after 7 days. Repeat.    Onychomycosis       * losartan 25 MG tablet    COZAAR    90 tablet    Take 1 tablet (25 mg) by mouth daily    Essential hypertension with goal blood pressure less than 140/90       * losartan 50 MG tablet    COZAAR    90 tablet    Take 1 tablet (50 mg) by mouth daily    Hypertension goal BP (blood pressure) < 140/90       MULTIVITAMIN Liqd           UNABLE TO FIND      MEDICATION NAME: OPC-3        * Notice:  This list has 2 medication(s) that are the same as other medications prescribed for you. Read the directions carefully, and ask your doctor or other care provider to review them with you.

## 2018-06-18 NOTE — TELEPHONE ENCOUNTER
Prior Authorization Retail Medication Request    Medication/Dose: qvar redihaler 80mcg  ICD code (if different than what is on RX):    Previously Tried and Failed:    Rationale:      Insurance Name:  [employer]  Insurance ID:  [id number]    Per insurance: PENDING FORM REVIEW-USE ALT PROD OR NATALYA RHOADES. MED [phone]  DRUG REQUIRES PRIOR AUTHORIZATION    Pharmacy Information (if different than what is on RX)  Name:    Phone:      Thank you,    Roxana Fitzpatrick [facility]

## 2018-06-18 NOTE — PROGRESS NOTES
"  SUBJECTIVE:   Kiersten Garibay is a 57 year old female who presents to clinic today for the following health issues:      Hypertension Follow-up      Outpatient blood pressures are being checked at home.  Results are 130's over 80's.    Low Salt Diet: low salt      Amount of exercise or physical activity: 2-3 days/week for an average of 30-45 minutes    Problems taking medications regularly: No    Medication side effects: none    Diet: low salt        Here to review stress test. Overall normal. Has chest pressure. Not related to activity. She has hx of asthma. Review need to use her controller medication even though she usually feels well. Does have stress with work and husbands medical concrens.     Problem list and histories reviewed & adjusted, as indicated.  Additional history: as documented    BP Readings from Last 3 Encounters:   06/18/18 156/84   05/21/18 138/84   01/22/18 128/74    Wt Readings from Last 3 Encounters:   06/18/18 131 lb (59.4 kg)   05/21/18 130 lb 6.4 oz (59.1 kg)   01/22/18 132 lb (59.9 kg)                    Reviewed and updated as needed this visit by clinical staff  Tobacco  Med Hx  Surg Hx  Fam Hx  Soc Hx      Reviewed and updated as needed this visit by Provider         ROS:  CONSTITUTIONAL: NEGATIVE for fever, chills, change in weight  ENT/MOUTH: NEGATIVE for ear, mouth and throat problems  RESP: NEGATIVE for significant cough or SOB  CV: intermttent chest tightness and NEGATIVE for cyanosis, dyspnea on exertion, irregular heart beat, lower extremity edema, orthopnea, palpitations, paroxysmal nocturnal dyspnea, syncope or near-syncope and tachycardia  MUSCULOSKELETAL: NEGATIVE for significant arthralgias or myalgia except for chest pressure  NEURO: NEGATIVE for weakness, dizziness or paresthesias  PSYCHIATRIC: NEGATIVE for changes in mood or affect    OBJECTIVE:     /84 (Cuff Size: Adult Large)  Pulse 64  Temp 98  F (36.7  C) (Oral)  Ht 5' 5.5\" (1.664 m)  Wt 131 lb " (59.4 kg)  LMP 03/24/2006  BMI 21.47 kg/m2  Body mass index is 21.47 kg/(m^2).   Recheck 138/80 and she also has normal blood pressures at home.   GENERAL: healthy, alert and no distress  NECK: no adenopathy, no asymmetry, masses, or scars and thyroid normal to palpation  RESP: lungs clear to auscultation - no rales, rhonchi or wheezes  CV: regular rate and rhythm, normal S1 S2, no S3 or S4, no murmur, click or rub, no peripheral edema and peripheral pulses strong  ABDOMEN: soft, nontender, no hepatosplenomegaly, no masses and bowel sounds normal  MS: no gross musculoskeletal defects noted, no edema    Diagnostic Test Results:  none     ASSESSMENT:         PLAN:   (I10) Hypertension goal BP (blood pressure) < 140/90  (primary encounter diagnosis)  Comment: controlled at home was mildly elevated here initially  Plan: losartan (COZAAR) 50 MG tablet         Continue present medications      (J45.20) Mild intermittent asthma without complication  Comment: could be contributor to her chest pressure  Plan: Asthma Action Plan (AAP), beclomethasone (QVAR)        80 MCG/ACT Inhaler        Use regularly and monitor.        25 min spent with patient >50% in review and counseling, reviewing ECHO and her medical concerns      See Patient Instructions    Barak Childress MD, MD  [facility]

## 2018-06-18 NOTE — LETTER
My Asthma Action Plan  Name: Kiersten Garibay   YOB: 1960  Date: 6/18/2018   My doctor: Barak Childress MD, MD   My clinic: [facility]        My Control Medicine: Beclomethasone (QVar) -  80 mcg 2 puffs BID  My Rescue Medicine: none used   My Asthma Severity: intermittent  Avoid your asthma triggers: upper respiratory infections and emotions  pollens            GREEN ZONE   Good Control    I feel good    No cough or wheeze    Can work, sleep and play without asthma symptoms       Take your asthma control medicine every day.     1. If exercise triggers your asthma, take your rescue medication    15 minutes before exercise or sports, and    During exercise if you have asthma symptoms  2. Spacer to use with inhaler: If you have a spacer, make sure to use it with your inhaler             YELLOW ZONE Getting Worse  I have ANY of these:    I do not feel good    Cough or wheeze    Chest feels tight    Wake up at night   1. Keep taking your Green Zone medications  2. Start taking your rescue medicine:    every 20 minutes for up to 1 hour. Then every 4 hours for 24-48 hours.  3. If you stay in the Yellow Zone for more than 12-24 hours, contact your doctor.  4. If you do not return to the Green Zone in 12-24 hours or you get worse, start taking your oral steroid medicine if prescribed by your provider.           RED ZONE Medical Alert - Get Help  I have ANY of these:    I feel awful    Medicine is not helping    Breathing getting harder    Trouble walking or talking    Nose opens wide to breathe       1. Take your rescue medicine NOW  2. If your provider has prescribed an oral steroid medicine, start taking it NOW  3. Call your doctor NOW  4. If you are still in the Red Zone after 20 minutes and you have not reached your doctor:    Take your rescue medicine again and    Call 911 or go to the emergency room right away    See your regular doctor within 2 weeks of an Emergency Room or  Urgent Care visit for follow-up treatment.          Annual Reminders:  Meet with Asthma Educator,  Flu Shot in the Fall, consider Pneumonia Vaccination for patients with asthma (aged 19 and older).    Pharmacy:    [id number] - [place], [place] - [address] AT [facility] & [address] [place] [address] PHARMACY 1629 - [place] - [address] [facility] [place] - [address]                      Asthma Triggers  How To Control Things That Make Your Asthma Worse    Triggers are things that make your asthma worse.  Look at the list below to help you find your triggers and what you can do about them.  You can help prevent asthma flare-ups by staying away from your triggers.      Trigger                                                          What you can do   Cigarette Smoke  Tobacco smoke can make asthma worse. Do not allow smoking in your home, car or around you.  Be sure no one smokes at a child s day care or school.  If you smoke, ask your health care provider for ways to help you quit.  Ask family members to quit too.  Ask your health care provider for a referral to Quit Plan to help you quit smoking, or call 5-229-475-PLAN.     Colds, Flu, Bronchitis  These are common triggers of asthma. Wash your hands often.  Don t touch your eyes, nose or mouth.  Get a flu shot every year.     Dust Mites  These are tiny bugs that live in cloth or carpet. They are too small to see. Wash sheets and blankets in hot water every week.   Encase pillows and mattress in dust mite proof covers.  Avoid having carpet if you can. If you have carpet, vacuum weekly.   Use a dust mask and HEPA vacuum.   Pollen and Outdoor Mold  Some people are allergic to trees, grass, or weed pollen, or molds. Try to keep your windows closed.  Limit time out doors when pollen count is high.   Ask you health care provider about taking medicine during allergy season.     Animal  Dander  Some people are allergic to skin flakes, urine or saliva from pets with fur or feathers. Keep pets with fur or feathers out of your home.    If you can t keep the pet outdoors, then keep the pet out of your bedroom.  Keep the bedroom door closed.  Keep pets off cloth furniture and away from stuffed toys.     Mice, Rats, and Cockroaches  Some people are allergic to the waste from these pests.   Cover food and garbage.  Clean up spills and food crumbs.  Store grease in the refrigerator.   Keep food out of the bedroom.   Indoor Mold  This can be a trigger if your home has high moisture. Fix leaking faucets, pipes, or other sources of water.   Clean moldy surfaces.  Dehumidify basement if it is damp and smelly.   Smoke, Strong Odors, and Sprays  These can reduce air quality. Stay away from strong odors and sprays, such as perfume, powder, hair spray, paints, smoke incense, paint, cleaning products, candles and new carpet.   Exercise or Sports  Some people with asthma have this trigger. Be active!  Ask your doctor about taking medicine before sports or exercise to prevent symptoms.    Warm up for 5-10 minutes before and after sports or exercise.     Other Triggers of Asthma  Cold air:  Cover your nose and mouth with a scarf.  Sometimes laughing or crying can be a trigger.  Some medicines and food can trigger asthma.

## 2018-06-18 NOTE — PATIENT INSTRUCTIONS
"Orders Placed This Encounter     Asthma Action Plan (AAP)     Look for AAP Text in Letters (\"Asthma Action Plan\")     losartan (COZAAR) 50 MG tablet     Sig: Take 1 tablet (50 mg) by mouth daily     Dispense:  90 tablet     Refill:  3     beclomethasone (QVAR) 80 MCG/ACT Inhaler     Sig: Inhale 2 puffs into the lungs 2 times daily     Dispense:  1 Inhaler     Refill:  3       "

## 2018-06-19 NOTE — TELEPHONE ENCOUNTER
Prior Authorization Approval    Authorization Effective Date:    Authorization Expiration Date:    Medication: qgil healyr 80mcg  Approved Dose/Quantity:    Reference #:     Insurance Company: [employer] - Phone [phone] Fax [phone]  Expected CoPay:       CoPay Card Available:      Foundation Assistance Needed:    Which Pharmacy is filling the prescription (Not needed for infusion/clinic administered): [facility] [place] - [place], [place] - [address] [place] [address].  Pharmacy Notified: Yes  Patient Notified: Yes

## 2018-06-19 NOTE — TELEPHONE ENCOUNTER
Central Prior Authorization Team   Phone: [phone]    PA Initiation    Medication: qvar redihaler 80mcg  Insurance Company: [employer] - Phone [phone] Fax [phone]  Pharmacy Filling the Rx: [facility] - [place], [place] - [address] [place] [address].  Filling Pharmacy Phone: [phone]  Filling Pharmacy Fax:    Start Date: 6/19/2018    Initiated PA over the phone.

## 2018-09-17 DIAGNOSIS — B35.1 ONYCHOMYCOSIS: ICD-10-CM

## 2018-09-18 RX ORDER — CICLOPIROX 80 MG/ML
SOLUTION TOPICAL
Qty: 6.6 ML | Refills: 0 | Status: SHIPPED | OUTPATIENT
Start: 2018-09-18 | End: 2020-11-23

## 2018-09-18 NOTE — TELEPHONE ENCOUNTER
Medication Detail      Disp Refills Start End JUAN CARLOS   ciclopirox 8 % SOLN 6.6 mL 5 4/4/2017  No   Sig: Apply thin layer to toes daily, then remove with alcohol after 7 days. Repeat.   Class: E-Prescribe   Order: [id number]   E-Prescribing Status: Receipt confirmed by pharmacy (4/4/2017 11:21 AM CDT)       Last Office Visit: 6/18/2018  Future Office visit:       Routing refill request to provider for review/approval because:  Drug not on the [facility], P or [facility] refill protocol or controlled substance

## 2018-09-18 NOTE — TELEPHONE ENCOUNTER
Refilled, saw  me for this one time but should get refills from now on from pcp who sees her regularly and he can assess the effectiveness of med  Jhon Abbott D.O.

## 2018-09-27 ASSESSMENT — ENCOUNTER SYMPTOMS
LEG PAIN: 0
MUSCLE CRAMPS: 1
NECK PAIN: 1
DIZZINESS: 1
BREAST PAIN: 1
ORTHOPNEA: 0
HYPERTENSION: 1
ARTHRALGIAS: 1
DECREASED LIBIDO: 1
DISTURBANCES IN COORDINATION: 1
MEMORY LOSS: 1
PALPITATIONS: 0
HEADACHES: 1

## 2018-10-01 ENCOUNTER — ONCOLOGY VISIT (OUTPATIENT)
Dept: ONCOLOGY | Facility: CLINIC | Age: 58
End: 2018-10-01
Attending: INTERNAL MEDICINE
Payer: COMMERCIAL

## 2018-10-01 ENCOUNTER — RADIANT APPOINTMENT (OUTPATIENT)
Dept: CT IMAGING | Facility: CLINIC | Age: 58
End: 2018-10-01
Attending: INTERNAL MEDICINE
Payer: COMMERCIAL

## 2018-10-01 VITALS
TEMPERATURE: 98.5 F | WEIGHT: 134.1 LBS | DIASTOLIC BLOOD PRESSURE: 81 MMHG | SYSTOLIC BLOOD PRESSURE: 128 MMHG | HEIGHT: 66 IN | BODY MASS INDEX: 21.55 KG/M2 | RESPIRATION RATE: 18 BRPM | HEART RATE: 80 BPM | OXYGEN SATURATION: 97 %

## 2018-10-01 DIAGNOSIS — C25.1 MALIGNANT NEOPLASM OF BODY OF PANCREAS (H): ICD-10-CM

## 2018-10-01 DIAGNOSIS — C25.1 MALIGNANT NEOPLASM OF BODY OF PANCREAS (H): Primary | ICD-10-CM

## 2018-10-01 DIAGNOSIS — R91.8 PULMONARY INFILTRATE: Primary | ICD-10-CM

## 2018-10-01 DIAGNOSIS — R91.8 PULMONARY INFILTRATE: ICD-10-CM

## 2018-10-01 LAB
ALBUMIN SERPL-MCNC: 3.6 G/DL (ref 3.4–5)
ALP SERPL-CCNC: 86 U/L (ref 40–150)
ALT SERPL W P-5'-P-CCNC: 51 U/L (ref 0–50)
ANION GAP SERPL CALCULATED.3IONS-SCNC: 6 MMOL/L (ref 3–14)
AST SERPL W P-5'-P-CCNC: 31 U/L (ref 0–45)
BASOPHILS # BLD AUTO: 0 10E9/L (ref 0–0.2)
BASOPHILS NFR BLD AUTO: 0.9 %
BILIRUB SERPL-MCNC: 0.6 MG/DL (ref 0.2–1.3)
BUN SERPL-MCNC: 12 MG/DL (ref 7–30)
CALCIUM SERPL-MCNC: 9.1 MG/DL (ref 8.5–10.1)
CHLORIDE SERPL-SCNC: 105 MMOL/L (ref 94–109)
CO2 SERPL-SCNC: 28 MMOL/L (ref 20–32)
CREAT BLD-MCNC: 0.8 MG/DL (ref 0.52–1.04)
CREAT SERPL-MCNC: 0.86 MG/DL (ref 0.52–1.04)
DIFFERENTIAL METHOD BLD: NORMAL
EOSINOPHIL # BLD AUTO: 0.1 10E9/L (ref 0–0.7)
EOSINOPHIL NFR BLD AUTO: 1.1 %
ERYTHROCYTE [DISTWIDTH] IN BLOOD BY AUTOMATED COUNT: 12.7 % (ref 10–15)
GFR SERPL CREATININE-BSD FRML MDRD: 67 ML/MIN/1.7M2
GFR SERPL CREATININE-BSD FRML MDRD: 73 ML/MIN/1.7M2
GLUCOSE SERPL-MCNC: 85 MG/DL (ref 70–99)
HCT VFR BLD AUTO: 38.5 % (ref 35–47)
HGB BLD-MCNC: 12.8 G/DL (ref 11.7–15.7)
IMM GRANULOCYTES # BLD: 0 10E9/L (ref 0–0.4)
IMM GRANULOCYTES NFR BLD: 0.2 %
LYMPHOCYTES # BLD AUTO: 1.3 10E9/L (ref 0.8–5.3)
LYMPHOCYTES NFR BLD AUTO: 27.7 %
MCH RBC QN AUTO: 30.2 PG (ref 26.5–33)
MCHC RBC AUTO-ENTMCNC: 33.2 G/DL (ref 31.5–36.5)
MCV RBC AUTO: 91 FL (ref 78–100)
MONOCYTES # BLD AUTO: 0.4 10E9/L (ref 0–1.3)
MONOCYTES NFR BLD AUTO: 8.6 %
NEUTROPHILS # BLD AUTO: 2.8 10E9/L (ref 1.6–8.3)
NEUTROPHILS NFR BLD AUTO: 61.5 %
NRBC # BLD AUTO: 0 10*3/UL
NRBC BLD AUTO-RTO: 0 /100
PLATELET # BLD AUTO: 222 10E9/L (ref 150–450)
POTASSIUM SERPL-SCNC: 4 MMOL/L (ref 3.4–5.3)
PROT SERPL-MCNC: 7.3 G/DL (ref 6.8–8.8)
RBC # BLD AUTO: 4.24 10E12/L (ref 3.8–5.2)
SODIUM SERPL-SCNC: 138 MMOL/L (ref 133–144)
WBC # BLD AUTO: 4.5 10E9/L (ref 4–11)

## 2018-10-01 PROCEDURE — 36415 COLL VENOUS BLD VENIPUNCTURE: CPT | Performed by: INTERNAL MEDICINE

## 2018-10-01 PROCEDURE — 80053 COMPREHEN METABOLIC PANEL: CPT | Performed by: INTERNAL MEDICINE

## 2018-10-01 PROCEDURE — 86316 IMMUNOASSAY TUMOR OTHER: CPT | Performed by: INTERNAL MEDICINE

## 2018-10-01 PROCEDURE — G0463 HOSPITAL OUTPT CLINIC VISIT: HCPCS | Mod: ZF

## 2018-10-01 PROCEDURE — 85025 COMPLETE CBC W/AUTO DIFF WBC: CPT | Performed by: INTERNAL MEDICINE

## 2018-10-01 PROCEDURE — 99213 OFFICE O/P EST LOW 20 MIN: CPT | Mod: ZP | Performed by: INTERNAL MEDICINE

## 2018-10-01 RX ORDER — IOPAMIDOL 755 MG/ML
80 INJECTION, SOLUTION INTRAVASCULAR ONCE
Status: COMPLETED | OUTPATIENT
Start: 2018-10-01 | End: 2018-10-01

## 2018-10-01 RX ADMIN — IOPAMIDOL 80 ML: 755 INJECTION, SOLUTION INTRAVASCULAR at 11:57

## 2018-10-01 ASSESSMENT — PAIN SCALES - GENERAL: PAINLEVEL: NO PAIN (0)

## 2018-10-01 NOTE — MR AVS SNAPSHOT
After Visit Summary   10/1/2018    Kiersten Garibay    MRN: [id number]           Patient Information     Date Of Birth          1960        Visit Information        Provider Department      10/1/2018 3:00 PM Jai Parker MD [facility]        Today's Diagnoses     Malignant neoplasm of body of pancreas (H)    -  1    Pulmonary infiltrate           Follow-ups after your visit        Follow-up notes from your care team     Return if concern of recurrence.      Your next 10 appointments already scheduled     Dec 05, 2018 12:30 PM CST   FULL PULMONARY FUNCTION with  PFL A   [facility] Function Testing ([facility])    [address]  3rd Floor  [facility] [id number] [phone]            Dec 05, 2018  1:35 PM CST   (Arrive by 1:20 PM)   New Patient Visit with Jass Ortega MD   [facility] ([facility])    [address]  [address]  [facility] [address] [phone]              Who to contact     If you have questions or need follow up information about today's clinic visit or your schedule please contact [facility] directly at [phone].  Normal or non-critical lab and imaging results will be communicated to you by MyChart, letter or phone within 4 business days after the clinic has received the results. If you do not hear from us within 7 days, please contact the clinic through MyChart or phone. If you have a critical or abnormal lab result, we will notify you by phone as soon as possible.  Submit refill requests through [facility] or call your pharmacy and they will forward the refill request to us. Please allow 3 business days for your refill to be completed.          Additional Information About Your Visit        [facility] Information     [facility] gives you secure access to your electronic health record. If you see a primary  "care provider, you can also send messages to your care team and make appointments. If you have questions, please call your primary care clinic.  If you do not have a primary care provider, please call [phone] and they will assist you.        Care EveryWhere ID     This is your Care EveryWhere ID. This could be used by other organizations to access your [facility] medical records  [id number]        Your Vitals Were     Pulse Temperature Respirations Height Last Period Pulse Oximetry    80 98.5  F (36.9  C) (Tympanic) 18 1.664 m (5' 5.51\") 03/24/2006 97%    BMI (Body Mass Index)                   21.97 kg/m2            Blood Pressure from Last 3 Encounters:   10/01/18 128/81   06/18/18 138/80   05/21/18 138/84    Weight from Last 3 Encounters:   10/01/18 60.8 kg (134 lb 1.6 oz)   06/18/18 59.4 kg (131 lb)   05/21/18 59.1 kg (130 lb 6.4 oz)              Today, you had the following     No orders found for display         Today's Medication Changes          These changes are accurate as of 10/1/18  3:39 PM.  If you have any questions, ask your nurse or doctor.               These medicines have changed or have updated prescriptions.        Dose/Directions    losartan 50 MG tablet   Commonly known as:  COZAAR   This may have changed:  Another medication with the same name was removed. Continue taking this medication, and follow the directions you see here.   Used for:  Hypertension goal BP (blood pressure) < 140/90   Changed by:  Jai Parker MD        Dose:  50 mg   Take 1 tablet (50 mg) by mouth daily   Quantity:  90 tablet   Refills:  3                Primary Care Provider Office Phone # Fax #    Barak Js Childress [phone]       [address] [facility] [address]        Equal Access to Services     MINAL KONG AH: Archana Freeman, satnam galvez, qaybta kaalleonard garcia. So [facility] [phone].    ATENCIÓN: Si " april coleman, tiene a king disposición servicios gratuitos de asistencia lingüística. Dede schulte [phone].    We comply with applicable federal civil rights laws and [place] laws. We do not discriminate on the basis of race, color, national origin, age, disability, sex, sexual orientation, or gender identity.            Thank you!     Thank you for choosing [facility]  for your care. Our goal is always to provide you with excellent care. Hearing back from our patients is one way we can continue to improve our services. Please take a few minutes to complete the written survey that you may receive in the mail after your visit with us. Thank you!             Your Updated Medication List - Protect others around you: Learn how to safely use, store and throw away your medicines at www.disposemymeds.org.          This list is accurate as of 10/1/18  3:39 PM.  Always use your most recent med list.                   Brand Name Dispense Instructions for use Diagnosis    beclomethasone 80 MCG/ACT Inhaler    QVAR    1 Inhaler    Inhale 2 puffs into the lungs 2 times daily    Mild intermittent asthma without complication       ciclopirox 8 % Soln     6.6 mL    APPLY THIN LAYER TO TOES DAILY, THEN REMOVE WITH ALCOHOL AFTER 7 DAYS. REPEAT    Onychomycosis       losartan 50 MG tablet    COZAAR    90 tablet    Take 1 tablet (50 mg) by mouth daily    Hypertension goal BP (blood pressure) < 140/90       MULTIVITAMIN Liqd           UNABLE TO FIND      MEDICATION NAME: OPC-3

## 2018-10-01 NOTE — PROGRESS NOTES
Kiersten Garibay is here today in follow-up of pancreatic neuroendocrine carcinoma.    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  She is here today for planned surveillance visit having had no evidence of disease at a visit 2 years ago.  She presently is essentially asymptomatic.  She does note that she is mildly fatigued but that has been stable for years and is unchanged.   She notes she occasionally gets some left upper quadrant pain that resolves immediately if she has a bowel movement.  She does not feel like she has an any abnormality in her stool habits.  Her weight is been stable.  She denies any respiratory symptoms.  She has been having no fevers chills or sweats.  They have no pets at home.  The remainder of a 10 point complete review of systems is otherwise negative.    On physical exam she is alert and well-appearing.  Her vital signs are unremarkable.  She has no scleral icterus or conjunctival injection.  No lesions are visible in the oropharynx.  She has no adenopathy palpable in the neck or supraclavicular spaces.  Her thyroid is readily palpable and unremarkable.  Her lungs are clear to auscultation without dullness to percussion.  Her heart rate and rhythm are regular without audible murmur gallop.  Her jugular venous pressure appears normal and there is no pathologic hepatojugular reflux.  Her abdomen is soft and nontender without palpable mass or organomegaly.  There is no demonstrable ascites.  She has no peripheral edema no tenderness in her calves or thighs.  She has no apparent skin rashes.  Her speech is fluent and her cranial nerves are grossly intact.    I reviewed with patient her  her CT scan and lab work.  She has normal electrolytes, normal renal function, normal liver enzymes, and normal blood counts.  A chromogranin A level is still pending.  On her CT scan for which I do not yet have the radiologist  interpretation she has a number of clusters of tree in bud opacities.  She had some very slight findings like this 2 years ago but these are much more prominent now.  I see nothing that looks like recurrence of her cancer.    Assessment/plan:  1.  History of resected low-grade pancreatic neuroendocrine carcinoma.  Her risk of recurrence is relatively low.  Recommended unless she has symptoms of concern that we not reimage her for another 2-3 years.  2.  Persistent/progressive tree-in-bud opacities in the lungs without significant pulmonary symptoms.  I am not sure the nature of these I suspect he may be allergic.  I have made a referral to our pulmonologist to review these findings with her and decide if it warrants any further follow-up.

## 2018-10-01 NOTE — LETTER
10/1/2018      RE: Kiersten Garibay  [address] [facility] [address]       Kiersten Garibay is here today in follow-up of pancreatic neuroendocrine carcinoma.    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  She is here today for planned surveillance visit having had no evidence of disease at a visit 2 years ago.  She presently is essentially asymptomatic.  She does note that she is mildly fatigued but that has been stable for years and is unchanged.   She notes she occasionally gets some left upper quadrant pain that resolves immediately if she has a bowel movement.  She does not feel like she has an any abnormality in her stool habits.  Her weight is been stable.  She denies any respiratory symptoms.  She has been having no fevers chills or sweats.  They have no pets at home.  The remainder of a 10 point complete review of systems is otherwise negative.    On physical exam she is alert and well-appearing.  Her vital signs are unremarkable.  She has no scleral icterus or conjunctival injection.  No lesions are visible in the oropharynx.  She has no adenopathy palpable in the neck or supraclavicular spaces.  Her thyroid is readily palpable and unremarkable.  Her lungs are clear to auscultation without dullness to percussion.  Her heart rate and rhythm are regular without audible murmur gallop.  Her jugular venous pressure appears normal and there is no pathologic hepatojugular reflux.  Her abdomen is soft and nontender without palpable mass or organomegaly.  There is no demonstrable ascites.  She has no peripheral edema no tenderness in her calves or thighs.  She has no apparent skin rashes.  Her speech is fluent and her cranial nerves are grossly intact.    I reviewed with patient her  her CT scan and lab work.  She has normal electrolytes, normal renal function, normal liver enzymes, and normal blood counts.  A chromogranin  A level is still pending.  On her CT scan for which I do not yet have the radiologist interpretation she has a number of clusters of tree in bud opacities.  She had some very slight findings like this 2 years ago but these are much more prominent now.  I see nothing that looks like recurrence of her cancer.    Assessment/plan:  1.  History of resected low-grade pancreatic neuroendocrine carcinoma.  Her risk of recurrence is relatively low.  Recommended unless she has symptoms of concern that we not reimage her for another 2-3 years.  2.  Persistent/progressive tree-in-bud opacities in the lungs without significant pulmonary symptoms.  I am not sure the nature of these I suspect he may be allergic.  I have made a referral to our pulmonologist to review these findings with her and decide if it warrants any further follow-up.    Jai Parker MD

## 2018-10-01 NOTE — DISCHARGE INSTRUCTIONS

## 2018-10-01 NOTE — NURSING NOTE
"Oncology Rooming Note    October 1, 2018 2:24 PM   Kiersten Garibay is a 58 year old female who presents for:    Chief Complaint   Patient presents with     Oncology Clinic Visit     Return visit related to Neuroendocrine Tumor     Initial Vitals: /81 (BP Location: Left arm, Patient Position: Sitting, Cuff Size: Adult Regular)  Pulse 80  Temp 98.5  F (36.9  C) (Tympanic)  Resp 18  Ht 1.664 m (5' 5.51\")  Wt 60.8 kg (134 lb 1.6 oz)  LMP 03/24/2006  SpO2 97%  BMI 21.97 kg/m2 Estimated body mass index is 21.97 kg/(m^2) as calculated from the following:    Height as of this encounter: 1.664 m (5' 5.51\").    Weight as of this encounter: 60.8 kg (134 lb 1.6 oz). Body surface area is 1.68 meters squared.  No Pain (0) Comment: Data Unavailable   Patient's last menstrual period was 03/24/2006.  Allergies reviewed: Yes  Medications reviewed: Yes    Medications: Medication refills not needed today.  Pharmacy name entered into [email or web address]:    [id number] [place], [place] - [address] AT [facility] & [address] [place] [address] PHARMACY 1629 - [facility] [address] [facility] [place] - [address]    Clinical concerns: No new concerns. Provider was notified.    10 minutes for nursing intake (face to face time)     Bridgett Sharpe LPN            "

## 2018-10-04 LAB — CGA SERPL-MCNC: 78 NG/ML (ref 0–95)

## 2018-11-04 ASSESSMENT — ENCOUNTER SYMPTOMS
WEIGHT LOSS: 0
TROUBLE SWALLOWING: 0
ARTHRALGIAS: 0
MYALGIAS: 1
SMELL DISTURBANCE: 0
WHEEZING: 0
WEIGHT GAIN: 0
SYNCOPE: 0
CHILLS: 1
POLYDIPSIA: 0
HALLUCINATIONS: 0
COUGH DISTURBING SLEEP: 0
JOINT SWELLING: 0
PALPITATIONS: 0
STIFFNESS: 1
LIGHT-HEADEDNESS: 0
BACK PAIN: 0
NECK PAIN: 1
BREAST MASS: 0
DECREASED APPETITE: 0
SNORES LOUDLY: 0
SORE THROAT: 1
SHORTNESS OF BREATH: 1
EYE PAIN: 0
NIGHT SWEATS: 0
FEVER: 0
SLEEP DISTURBANCES DUE TO BREATHING: 0
POLYPHAGIA: 0
SPUTUM PRODUCTION: 1
NECK MASS: 0
EYE REDNESS: 0
SINUS CONGESTION: 1
POSTURAL DYSPNEA: 0
MUSCLE CRAMPS: 1
MUSCLE WEAKNESS: 0
HOARSE VOICE: 1
ORTHOPNEA: 0
EYE WATERING: 1
EXERCISE INTOLERANCE: 1
DECREASED LIBIDO: 0
HOT FLASHES: 0
DYSPNEA ON EXERTION: 1
COUGH: 1
HYPERTENSION: 1
HYPOTENSION: 0
SINUS PAIN: 1
HEMOPTYSIS: 0
DOUBLE VISION: 0
ALTERED TEMPERATURE REGULATION: 1
EYE IRRITATION: 1
LEG PAIN: 0
BREAST PAIN: 1
FATIGUE: 1
INCREASED ENERGY: 1
TASTE DISTURBANCE: 0

## 2018-11-05 ENCOUNTER — OFFICE VISIT (OUTPATIENT)
Dept: PULMONOLOGY | Facility: CLINIC | Age: 58
End: 2018-11-05
Attending: FAMILY MEDICINE
Payer: COMMERCIAL

## 2018-11-05 VITALS
WEIGHT: 132 LBS | OXYGEN SATURATION: 97 % | SYSTOLIC BLOOD PRESSURE: 163 MMHG | HEART RATE: 79 BPM | HEIGHT: 66 IN | RESPIRATION RATE: 16 BRPM | BODY MASS INDEX: 21.21 KG/M2 | DIASTOLIC BLOOD PRESSURE: 103 MMHG

## 2018-11-05 DIAGNOSIS — R91.8 PULMONARY INFILTRATE: ICD-10-CM

## 2018-11-05 DIAGNOSIS — J45.20 MILD INTERMITTENT ASTHMA WITHOUT COMPLICATION: ICD-10-CM

## 2018-11-05 PROCEDURE — G0463 HOSPITAL OUTPT CLINIC VISIT: HCPCS | Mod: ZF

## 2018-11-05 ASSESSMENT — PAIN SCALES - GENERAL: PAINLEVEL: MILD PAIN (2)

## 2018-11-05 NOTE — NURSING NOTE
Chief Complaint   Patient presents with     Consult     Pulmonary infiltrate      Angie Heaton CMA

## 2018-11-05 NOTE — MR AVS SNAPSHOT
After Visit Summary   11/5/2018    Kiersten Garibay    MRN: [id number]           Patient Information     Date Of Birth          1960        Visit Information        Provider Department      11/5/2018 8:05 AM Stewart Hamm MD [facility]        Today's Diagnoses     Pulmonary infiltrate        Mild intermittent asthma without complication           Follow-ups after your visit        Follow-up notes from your care team     Return in about 3 months (around 2/5/2019).      Your next 10 appointments already scheduled     Nov 14, 2018  4:00 PM CST   Office Visit with Kate Talbot MD   [facility] ([facility])    [address] [place] [address]  [address]  [facility] [address] [phone]           Bring a current list of meds and any records pertaining to this visit. For Physicals, please bring immunization records and any forms needing to be filled out. Please arrive 10 minutes early to complete paperwork.            Feb 07, 2019  8:00 AM CST   PFT VISIT with  PFL JESSE   [facility] ([facility])    [address] [address]  3rd Floor  [facility] [address] [phone]            Feb 07, 2019  8:40 AM CST   (Arrive by 8:25 AM)   Return Visit with Stewart Hamm MD   [facility] ([facility])    [address] [place] [address]  [address] [place] [address] [phone]              Future tests that were ordered for you today     Open Future Orders        Priority Expected Expires Ordered    Pulmonary Function Test Routine  11/5/2019 11/5/2018            Who to contact     If you have questions or need follow up information about today's clinic visit or your schedule please contact [facility] directly at [phone].  Normal or non-critical lab and imaging results will be communicated  "to you by TurnTidehart, letter or phone within 4 business days after the clinic has received the results. If you do not hear from us within 7 days, please contact the clinic through leemail or phone. If you have a critical or abnormal lab result, we will notify you by phone as soon as possible.  Submit refill requests through leemail or call your pharmacy and they will forward the refill request to us. Please allow 3 business days for your refill to be completed.          Additional Information About Your Visit        leemail Information     leemail gives you secure access to your electronic health record. If you see a primary care provider, you can also send messages to your care team and make appointments. If you have questions, please call your primary care clinic.  If you do not have a primary care provider, please call [phone] and they will assist you.        Care EveryWhere ID     This is your Care EveryWhere ID. This could be used by other organizations to access your [facility] medical records  [id number]        Your Vitals Were     Pulse Respirations Height Last Period Pulse Oximetry BMI (Body Mass Index)    79 16 1.664 m (5' 5.51\") 03/24/2006 97% 21.62 kg/m2       Blood Pressure from Last 3 Encounters:   11/05/18 (!) 163/103   10/01/18 128/81   06/18/18 138/80    Weight from Last 3 Encounters:   11/05/18 59.9 kg (132 lb)   10/01/18 60.8 kg (134 lb 1.6 oz)   06/18/18 59.4 kg (131 lb)                 Today's Medication Changes          These changes are accurate as of 11/5/18  8:28 AM.  If you have any questions, ask your nurse or doctor.               Start taking these medicines.        Dose/Directions    beclomethasone HFA 80 MCG/ACT inhaler   Commonly known as:  QVAR REDIHALER   Used for:  Mild intermittent asthma without complication   Started by:  Stewart Hamm MD        Dose:  1 puff   Inhale 1 puff into the lungs 2 times daily   Quantity:  10.6 g   Refills:  3         Stop taking these " medicines if you haven't already. Please contact your care team if you have questions.     beclomethasone 80 MCG/ACT Aers IS A DISCONTINUED MEDICATION   Commonly known as:  QVAR   Stopped by:  Stewart Hamm MD                Where to get your medicines      These medications were sent to [facility] - [place], [place] - [address]  [address] [facility] [facility] [address]     Phone:  [phone]     beclomethasone HFA 80 MCG/ACT inhaler                Primary Care Provider Office Phone # Fax #    Barak Js Childress [phone]       [address] [facility] [address]        Equal Access to Services     [facility] : Hadii cj guevara hadasho Somoncho, waaxda luqadaha, qaybta kaalmada adeegyada, leonard villeda . So [facility] [phone].    ATENCIÓN: Si habla español, tiene a king disposición servicios gratuitos de asistencia lingüística. Llame al [phone].    We comply with applicable federal civil rights laws and [place] laws. We do not discriminate on the basis of race, color, national origin, age, disability, sex, sexual orientation, or gender identity.            Thank you!     Thank you for choosing [facility]  for your care. Our goal is always to provide you with excellent care. Hearing back from our patients is one way we can continue to improve our services. Please take a few minutes to complete the written survey that you may receive in the mail after your visit with us. Thank you!             Your Updated Medication List - Protect others around you: Learn how to safely use, store and throw away your medicines at www.disposemymeds.org.          This list is accurate as of 11/5/18  8:28 AM.  Always use your most recent med list.                   Brand Name Dispense Instructions for use Diagnosis    beclomethasone HFA 80 MCG/ACT inhaler    QVAR REDIHALER    10.6 g    Inhale 1 puff into  the lungs 2 times daily    Mild intermittent asthma without complication       ciclopirox 8 % Soln     6.6 mL    APPLY THIN LAYER TO TOES DAILY, THEN REMOVE WITH ALCOHOL AFTER 7 DAYS. REPEAT    Onychomycosis       losartan 50 MG tablet    COZAAR    90 tablet    Take 1 tablet (50 mg) by mouth daily    Hypertension goal BP (blood pressure) < 140/90       MULTIVITAMIN Liqd           UNABLE TO FIND      MEDICATION NAME: OPC-3
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
## 2018-11-11 LAB
DLCOUNC-%PRED-PRE: 102 %
DLCOUNC-PRE: 22.7 ML/MIN/MMHG
DLCOUNC-PRED: 22.21 ML/MIN/MMHG
ERV-%PRED-PRE: 91 %
ERV-PRE: 0.94 L
ERV-PRED: 1.03 L
EXPTIME-PRE: 8.6 SEC
FEF2575-%PRED-POST: 74 %
FEF2575-%PRED-PRE: 49 %
FEF2575-POST: 1.64 L/SEC
FEF2575-PRE: 1.09 L/SEC
FEF2575-PRED: 2.21 L/SEC
FEFMAX-%PRED-PRE: 65 %
FEFMAX-PRE: 4.29 L/SEC
FEFMAX-PRED: 6.51 L/SEC
FEV1-%PRED-PRE: 74 %
FEV1-PRE: 1.82 L
FEV1FEV6-PRE: 64 %
FEV1FEV6-PRED: 81 %
FEV1FVC-PRE: 61 %
FEV1FVC-PRED: 78 %
FEV1SVC-PRE: 58 %
FEV1SVC-PRED: 71 %
FIFMAX-PRE: 5.29 L/SEC
FRCPLETH-%PRED-PRE: 140 %
FRCPLETH-PRE: 3.93 L
FRCPLETH-PRED: 2.79 L
FVC-%PRED-PRE: 96 %
FVC-PRE: 2.96 L
FVC-PRED: 3.06 L
IC-%PRED-PRE: 90 %
IC-PRE: 2.2 L
IC-PRED: 2.43 L
RVPLETH-%PRED-PRE: 151 %
RVPLETH-PRE: 2.99 L
RVPLETH-PRED: 1.98 L
TLCPLETH-%PRED-PRE: 118 %
TLCPLETH-PRE: 6.13 L
TLCPLETH-PRED: 5.19 L
VA-%PRED-PRE: 84 %
VA-PRE: 4.49 L
VC-%PRED-PRE: 90 %
VC-PRE: 3.14 L
VC-PRED: 3.46 L

## 2018-11-14 ENCOUNTER — OFFICE VISIT (OUTPATIENT)
Dept: OBGYN | Facility: CLINIC | Age: 58
End: 2018-11-14
Payer: COMMERCIAL

## 2018-11-14 VITALS
HEART RATE: 63 BPM | DIASTOLIC BLOOD PRESSURE: 88 MMHG | BODY MASS INDEX: 21.46 KG/M2 | WEIGHT: 131 LBS | SYSTOLIC BLOOD PRESSURE: 142 MMHG

## 2018-11-14 DIAGNOSIS — N83.202 LEFT OVARIAN CYST: Primary | ICD-10-CM

## 2018-11-14 DIAGNOSIS — N64.4 PAIN OF RIGHT BREAST: ICD-10-CM

## 2018-11-14 PROCEDURE — 99213 OFFICE O/P EST LOW 20 MIN: CPT | Performed by: OBSTETRICS & GYNECOLOGY

## 2018-11-14 NOTE — MR AVS SNAPSHOT
After Visit Summary   11/14/2018    Kiersten Garibay    MRN: [id number]           Patient Information     Date Of Birth          1960        Visit Information        Provider Department      11/14/2018 4:00 PM Kate Talbot [place] [facility]        Today's Diagnoses     Left ovarian cyst    -  1    Pain of right breast           Follow-ups after your visit        Your next 10 appointments already scheduled     Feb 07, 2019  8:00 AM CST   PFT VISIT with  PFL JESSE   [facility] Pulmonary Function Testing ([facility])    [address]  3rd Floor  [facility] [id number] [phone]            Feb 07, 2019  8:40 AM CST   (Arrive by 8:25 AM)   Return Visit with Stewart Hamm MD   [facility] ([facility])    [address]  [address]  [facility] [id number] [phone]              Future tests that were ordered for you today     Open Future Orders        Priority Expected Expires Ordered    US Pelvic Complete w Transvaginal Routine  11/14/2019 11/14/2018            Who to contact     If you have questions or need follow up information about today's clinic visit or your schedule please contact [facility] [place] at [phone].  Normal or non-critical lab and imaging results will be communicated to you by MyChart, letter or phone within 4 business days after the clinic has received the results. If you do not hear from us within 7 days, please contact the clinic through MyChart or phone. If you have a critical or abnormal lab result, we will notify you by phone as soon as possible.  Submit refill requests through [employer] or call your pharmacy and they will forward the refill request to us. Please allow 3 business days for your refill to be completed.          Additional Information About Your Visit        [employer] Information     [employer] gives you secure access  to your electronic health record. If you see a primary care provider, you can also send messages to your care team and make appointments. If you have questions, please call your primary care clinic.  If you do not have a primary care provider, please call [phone] and they will assist you.        Care EveryWhere ID     This is your Care EveryWhere ID. This could be used by other organizations to access your [facility] medical records  [id number]        Your Vitals Were     Pulse Last Period BMI (Body Mass Index)             63 03/24/2006 21.46 kg/m2          Blood Pressure from Last 3 Encounters:   11/14/18 142/88   11/05/18 (!) 163/103   10/01/18 128/81    Weight from Last 3 Encounters:   11/14/18 131 lb (59.4 kg)   11/05/18 132 lb (59.9 kg)   10/01/18 134 lb 1.6 oz (60.8 kg)               Primary Care Provider Office Phone # Fax #    Barak Childress [phone]       [address] [place] [address]        Equal Access to Services     [facility] : Hadii aad ku hadasho Soomaali, waaxda luqadaha, qaybta kaalmada adeaden, leonard villeda . So [facility] [place] [phone].    ATENCIÓN: Si habla español, tiene a king disposición servicios gratuitos de asistencia lingüística. Dede al [phone].    We comply with applicable federal civil rights laws and [place] laws. We do not discriminate on the basis of race, color, national origin, age, disability, sex, sexual orientation, or gender identity.            Thank you!     Thank you for choosing [facility]  for your care. Our goal is always to provide you with excellent care. Hearing back from our patients is one way we can continue to improve our services. Please take a few minutes to complete the written survey that you may receive in the mail after your visit with us. Thank you!             Your Updated Medication List - Protect others around you: Learn how to safely use, store and throw away  your medicines at www.disposemymeds.org.          This list is accurate as of 11/14/18  4:33 PM.  Always use your most recent med list.                   Brand Name Dispense Instructions for use Diagnosis    beclomethasone HFA 80 MCG/ACT inhaler    QVAR REDIHALER    10.6 g    Inhale 1 puff into the lungs 2 times daily    Mild intermittent asthma without complication       ciclopirox 8 % Soln     6.6 mL    APPLY THIN LAYER TO TOES DAILY, THEN REMOVE WITH ALCOHOL AFTER 7 DAYS. REPEAT    Onychomycosis       losartan 50 MG tablet    COZAAR    90 tablet    Take 1 tablet (50 mg) by mouth daily    Hypertension goal BP (blood pressure) < 140/90       MULTIVITAMIN Liqd           UNABLE TO FIND      MEDICATION NAME: OPC-3

## 2018-11-14 NOTE — NURSING NOTE
"Chief Complaint   Patient presents with     Consult       Initial /88  Pulse 63  Wt 131 lb (59.4 kg)  LMP 2006  BMI 21.46 kg/m2 Estimated body mass index is 21.46 kg/(m^2) as calculated from the following:    Height as of 18: 5' 5.51\" (1.664 m).    Weight as of this encounter: 131 lb (59.4 kg).  BP completed using cuff size: regular    Questioned patient about current smoking habits.  Pt. has never smoked.          The following HM Due: NONE      The following patient reported/Care Every where data was sent to:  P ABSTRACT QUALITY INITIATIVES [[id number]]        n/a and patient has appointment for today     Lillian Gann MA           "
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
## 2018-11-28 ENCOUNTER — RADIANT APPOINTMENT (OUTPATIENT)
Dept: ULTRASOUND IMAGING | Facility: CLINIC | Age: 58
End: 2018-11-28
Attending: OBSTETRICS & GYNECOLOGY
Payer: COMMERCIAL

## 2018-11-28 DIAGNOSIS — N83.202 LEFT OVARIAN CYST: ICD-10-CM

## 2018-11-28 PROCEDURE — 76830 TRANSVAGINAL US NON-OB: CPT | Performed by: OBSTETRICS & GYNECOLOGY

## 2019-02-02 ASSESSMENT — ENCOUNTER SYMPTOMS
MEMORY LOSS: 0
SNORES LOUDLY: 0
HEADACHES: 1
TREMORS: 0
POSTURAL DYSPNEA: 0
HYPOTENSION: 0
DIZZINESS: 1
SHORTNESS OF BREATH: 0
NUMBNESS: 0
SINUS PAIN: 1
SPEECH CHANGE: 1
COUGH: 1
EYE PAIN: 1
COUGH DISTURBING SLEEP: 0
DYSPNEA ON EXERTION: 1
LIGHT-HEADEDNESS: 0
STIFFNESS: 1
WEAKNESS: 0
LEG PAIN: 0
PALPITATIONS: 1
TINGLING: 0
EYE REDNESS: 0
SPUTUM PRODUCTION: 0
SEIZURES: 0
HOARSE VOICE: 1
EYE WATERING: 1
DISTURBANCES IN COORDINATION: 0
LOSS OF CONSCIOUSNESS: 0
ARTHRALGIAS: 0
BREAST MASS: 0
EYE IRRITATION: 1
EXERCISE INTOLERANCE: 1
TASTE DISTURBANCE: 0
BACK PAIN: 0
SORE THROAT: 1
ORTHOPNEA: 0
SLEEP DISTURBANCES DUE TO BREATHING: 0
PARALYSIS: 0
BREAST PAIN: 1
SYNCOPE: 0
TROUBLE SWALLOWING: 0
NECK MASS: 0
NECK PAIN: 1
SINUS CONGESTION: 0
JOINT SWELLING: 0
HYPERTENSION: 1
MYALGIAS: 0
SMELL DISTURBANCE: 0
MUSCLE WEAKNESS: 0
HEMOPTYSIS: 0
MUSCLE CRAMPS: 1

## 2019-02-07 ENCOUNTER — OFFICE VISIT (OUTPATIENT)
Dept: PULMONOLOGY | Facility: CLINIC | Age: 59
End: 2019-02-07
Payer: COMMERCIAL

## 2019-02-07 VITALS
HEIGHT: 66 IN | DIASTOLIC BLOOD PRESSURE: 91 MMHG | SYSTOLIC BLOOD PRESSURE: 133 MMHG | BODY MASS INDEX: 21.05 KG/M2 | WEIGHT: 131 LBS | HEART RATE: 65 BPM | OXYGEN SATURATION: 97 %

## 2019-02-07 DIAGNOSIS — J47.9 BRONCHIECTASIS WITHOUT COMPLICATION (H): ICD-10-CM

## 2019-02-07 DIAGNOSIS — J45.40 MODERATE PERSISTENT ASTHMA, UNSPECIFIED WHETHER COMPLICATED: Primary | ICD-10-CM

## 2019-02-07 DIAGNOSIS — J45.20 MILD INTERMITTENT ASTHMA WITHOUT COMPLICATION: Primary | ICD-10-CM

## 2019-02-07 LAB
EXPTIME-PRE: 7.67 SEC
FEF2575-%PRED-PRE: 47 %
FEF2575-PRE: 1.05 L/SEC
FEF2575-PRED: 2.23 L/SEC
FEFMAX-%PRED-PRE: 70 %
FEFMAX-PRE: 4.63 L/SEC
FEFMAX-PRED: 6.53 L/SEC
FEV1-%PRED-PRE: 80 %
FEV1-PRE: 1.97 L
FEV1FEV6-PRE: 64 %
FEV1FEV6-PRED: 81 %
FEV1FVC-PRE: 62 %
FEV1FVC-PRED: 80 %
FIFMAX-PRE: 5.74 L/SEC
FVC-%PRED-PRE: 103 %
FVC-PRE: 3.19 L
FVC-PRED: 3.07 L

## 2019-02-07 PROCEDURE — G0463 HOSPITAL OUTPT CLINIC VISIT: HCPCS

## 2019-02-07 RX ORDER — ALBUTEROL SULFATE 90 UG/1
2 AEROSOL, METERED RESPIRATORY (INHALATION) EVERY 6 HOURS
Qty: 1 INHALER | Refills: 11 | Status: SHIPPED | OUTPATIENT
Start: 2019-02-07 | End: 2020-11-25

## 2019-02-07 ASSESSMENT — PAIN SCALES - GENERAL: PAINLEVEL: MILD PAIN (3)

## 2019-02-07 ASSESSMENT — MIFFLIN-ST. JEOR: SCORE: 1173.02
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
## 2019-02-07 NOTE — NURSING NOTE
Chief Complaint   Patient presents with     RECHECK     Return      Vitals were taken and medications were reconciled.     MARIA ELENA Leal  8:37 AM

## 2019-03-13 ENCOUNTER — OFFICE VISIT (OUTPATIENT)
Dept: OTOLARYNGOLOGY | Facility: CLINIC | Age: 59
End: 2019-03-13
Payer: COMMERCIAL

## 2019-03-13 VITALS
OXYGEN SATURATION: 99 % | HEART RATE: 62 BPM | DIASTOLIC BLOOD PRESSURE: 93 MMHG | WEIGHT: 130 LBS | RESPIRATION RATE: 16 BRPM | BODY MASS INDEX: 20.89 KG/M2 | HEIGHT: 66 IN | SYSTOLIC BLOOD PRESSURE: 189 MMHG

## 2019-03-13 DIAGNOSIS — H92.02 OTALGIA, LEFT: Primary | ICD-10-CM

## 2019-03-13 PROCEDURE — 99214 OFFICE O/P EST MOD 30 MIN: CPT | Performed by: OTOLARYNGOLOGY

## 2019-03-13 ASSESSMENT — MIFFLIN-ST. JEOR: SCORE: 1168.49

## 2019-03-13 NOTE — PROGRESS NOTES
"Chief Complaint - left ear pain    History of Present Illness - Kiersten Garibay is a 58 year old female who returns with ear concerns. I saw her in 2017 for dizziness. Then she noted vertigo in which the entire room spins, or they spin, or there is a sense of motion.  It lasts for hours to a day. This has happened 3-4 times a few years ago. The patient had nausea. I thought she had Meniere's or vestibular migraine. She has watched her sodium and the vertigo has gone away.     She returns and the patient has noted left ear symptoms. + left otalgia, sometimes \"solid\" sometimes burning. Some ear clogging, but no hearing loss. Can be exacerbated by a cold. No right ear troubles. Has a history of TMJ, but nothing lately. Has tried ibuprofen, helps the pain, but now not helping. Chewing doesn't make it worse. Lately has prevented her from sleeping. She doesn't think she grinds at night.     Past Medical History -   Patient Active Problem List   Diagnosis     Stress urinary incontinence     CARDIOVASCULAR SCREENING; LDL GOAL LESS THAN 160     Low back pain     Hypertension goal BP (blood pressure) < 140/90     Malignant neoplasm of body of pancreas (H)     Essential hypertension with goal blood pressure less than 140/90     Pulmonary infiltrate     Moderate persistent asthma, unspecified whether complicated     Bronchiectasis without complication (H)       Current Medications -   Current Outpatient Medications:      albuterol (PROAIR HFA) 108 (90 Base) MCG/ACT inhaler, Inhale 2 puffs into the lungs every 6 hours, Disp: 1 Inhaler, Rfl: 11     beclomethasone HFA (QVAR REDIHALER) 80 MCG/ACT inhaler, Inhale 1 puff into the lungs 2 times daily, Disp: 10.6 g, Rfl: 3     ciclopirox 8 % SOLN, APPLY THIN LAYER TO TOES DAILY, THEN REMOVE WITH ALCOHOL AFTER 7 DAYS. REPEAT, Disp: 6.6 mL, Rfl: 0     fluticasone-vilanterol (BREO ELLIPTA) 100-25 MCG/INH inhaler, Inhale 1 puff into the lungs daily, Disp: 1 Inhaler, Rfl: 11     " losartan (COZAAR) 50 MG tablet, Take 1 tablet (50 mg) by mouth daily, Disp: 90 tablet, Rfl: 3     Multiple Vitamins-Minerals (MULTIVITAMIN) LIQD, , Disp: , Rfl:      UNABLE TO FIND, MEDICATION NAME: OPC-3, Disp: , Rfl:     Allergies -   Allergies   Allergen Reactions     No Known Drug Allergies        Social History -   Social History     Social History     Marital status:      Spouse name: N/A     Number of children: 2     Years of education: N/A     Occupational History           [employer].       [employer]     Social History Main Topics     Smoking status: Former Smoker     Packs/day: 1.00     Years: 15.00     Types: Cigarettes     Quit date: 5/2/1990     Smokeless tobacco: Former User      Comment: quit 16-17 years ago     Alcohol use 0.0 oz/week     0 Standard drinks or equivalent per week      Comment: social - 5 drinks a week     Drug use: No     Sexual activity: Yes     Partners: Male     Other Topics Concern      Service No     Blood Transfusions No     Caffeine Concern No     Occupational Exposure No     Hobby Hazards No     Sleep Concern No     Stress Concern No     Weight Concern No     Special Diet No     Back Care No     Exercise Yes     Bike Helmet No     Seat Belt Yes     Self-Exams Yes     Parent/Sibling W/ Cabg, Mi Or Angioplasty Before 65f 55m? Yes     father     Social History Narrative    Caffeine intake/servings daily - 1-2    Calcium intake/servings daily - 0-1    Exercise 0 times weekly - describe walking, cardio, weights    Sunscreen used - Yes    Seatbelts used - Yes    Guns stored in the home - Yes    Self Breast Exam - No    Pap test up to date -  Yes as of today     Eye exam up to date -  Yes    Dental exam up to date -  Yes    DEXA scan up to date -  No    Flex Sig/Colonoscopy up to date -  Yes    Mammography up to date -  No    Immunizations reviewed and up to date - Yes    Abuse: Current or Past (Physical, Sexual or Emotional) - No  "   Do you feel safe in your environment - Yes    Do you cope well with stress - Yes    Do you suffer from insomnia - No        Baldwinvolodymyr Oneal MA January 13, 2012                           Family History -   Family History   Problem Relation Age of Onset     Cancer Mother         KIDNEY CANCER     Lipids Mother      Glaucoma Mother      Other Cancer Mother      Heart Disease Father      Hypertension Father      Diabetes Father      Neurologic Disorder Father         MIGRAINES     Hypertension Sister      Heart Disease Paternal Grandmother      Cancer Paternal Grandmother         STOMACH CANCER     Cerebrovascular Disease Maternal Grandfather      Cerebrovascular Disease Paternal Grandfather      Diabetes Paternal Grandfather      Asthma Paternal Grandfather      Alzheimer Disease Maternal Grandmother      Lipids Sister      Cerebrovascular Disease Cousin        Review of Systems - As per HPI and PMHx, otherwise 7 system review of the head and neck negative.    Physical Exam  BP (!) 189/93   Pulse 62   Resp 16   Ht 1.664 m (5' 5.5\")   Wt 59 kg (130 lb)   LMP 03/24/2006   SpO2 99%   BMI 21.30 kg/m    General - The patient is in no distress.  Alert and oriented x3, answers questions and cooperates with examination appropriately.   Voice and Breathing - The patient was breathing comfortably without the use of accessory muscles. There was no wheezing, stridor, or stertor.  The patients voice was clear and strong, with no dysphonia.    Eyes - Extraocular movements intact. Sclera were not icteric or injected, conjunctiva were pink and moist. No nystagmus.  Neurologic - Cranial nerves II-XII are grossly intact. Specifically, the facial nerve is intact, House-Brackmann grade 1 of 6.    Mouth - Examination of the oral cavity showed pink, healthy oral mucosa. No lesions or ulcerations noted.  The tongue was mobile and protrudes midline.   Oropharynx - The walls of the oropharynx were smooth, pink, moist, symmetric, and had no " lesions or ulcerations. The uvula was midline and the palate raised symmetrically.   Neck -  Palpation of the occipital, submental, submandibular, internal jugular chain, and supraclavicular nodes did not demonstrate any abnormal lymph nodes or masses. The parotid glands were without masses. Palpation of the thyroid was soft and smooth, with no nodules or goiter appreciated.  The trachea was midline.  Ears - no pain with palpation of the ears, TMJ, parotid, or neck. The auricles appeared normal. The external auditory canals were nonedematous and nonerythematous. The tympanic membranes are normal in appearance, bony landmarks are intact.  No retraction, perforation, or masses.  No fluid or purulence was seen in the external canal or the middle ear.     A/P - Kiersten Garibay is a 58 year old female with left ear pain. However, the ear canal, tympanic membrane, and middle ear appear normal. I still worry this maybe TMJ, but cervical spine disease is also possible.  However given that this is been going on and worsening slowly I want to rule out any sort of tumor in the deep neck or ear.  Therefore I will get a CT neck with contrast.  If this is normal we could consider medication such as gabapentin or topiramate for possible neuropathic pain or headaches.  TMJ is a consideration but she is tried things for this and feels like when she is had this in the past that is different.      Turner Kirkland MD  Otolaryngology  [facility]

## 2019-03-13 NOTE — PATIENT INSTRUCTIONS
General Scheduling Information  To schedule your CT/MRI scan, please contact [place] at [phone]   [address] [place] [address]    To schedule your Surgery, please contact our Specialty Schedulers at [phone]    ENT Clinic Locations Clinic Hours Telephone Number     [facility]  [address] [place] [address]   Tuesday:       8:00am -- 4:00pm    Wednesday:  8:00am - 4:00pm   To schedule an appointment with   Dr. Kirkland,   please contact our   Specialty Scheduling Department at:     [phone]       [facility]  [address] [place] [address]   Friday:          8:00am - 4:00pm         Urgent Care Locations Clinic Hours Telephone Numbers     [facility] Guerra  [address] [place] [address]     Monday-Friday:     11:00pm - 9:00pm    Saturday-Sunday:  9:00am - 5:00pm   [phone]     [facility]  [address] [place] [address]     Monday-Friday:      5:00pm - 9:00pm     Saturday-Sunday:  9:00am - 5:00pm   [phone]

## 2019-03-13 NOTE — NURSING NOTE
Kiersten to follow up with Primary Care provider regarding elevated blood pressure.  Patient does take Losarten, but states that she has to see her PCP to discuss changes in her BP medication

## 2019-03-13 NOTE — LETTER
"    3/13/2019         RE: Kiersten Garibay  [address] [facility] [address]        Dear Colleague,    Thank you for referring your patient, Kiersten Garibay, to the [facility]. Please see a copy of my visit note below.    Chief Complaint - left ear pain    History of Present Illness - Kiersten Garibay is a 58 year old female who returns with ear concerns. I saw her in 2017 for dizziness. Then she noted vertigo in which the entire room spins, or they spin, or there is a sense of motion.  It lasts for hours to a day. This has happened 3-4 times a few years ago. The patient had nausea. I thought she had Meniere's or vestibular migraine. She has watched her sodium and the vertigo has gone away.     She returns and the patient has noted left ear symptoms. + left otalgia, sometimes \"solid\" sometimes burning. Some ear clogging, but no hearing loss. Can be exacerbated by a cold. No right ear troubles. Has a history of TMJ, but nothing lately. Has tried ibuprofen, helps the pain, but now not helping. Chewing doesn't make it worse. Lately has prevented her from sleeping. She doesn't think she grinds at night.     Past Medical History -   Patient Active Problem List   Diagnosis     Stress urinary incontinence     CARDIOVASCULAR SCREENING; LDL GOAL LESS THAN 160     Low back pain     Hypertension goal BP (blood pressure) < 140/90     Malignant neoplasm of body of pancreas (H)     Essential hypertension with goal blood pressure less than 140/90     Pulmonary infiltrate     Moderate persistent asthma, unspecified whether complicated     Bronchiectasis without complication (H)       Current Medications -   Current Outpatient Medications:      albuterol (PROAIR HFA) 108 (90 Base) MCG/ACT inhaler, Inhale 2 puffs into the lungs every 6 hours, Disp: 1 Inhaler, Rfl: 11     beclomethasone HFA (QVAR REDIHALER) 80 MCG/ACT inhaler, Inhale 1 puff into the lungs 2 times daily, Disp: 10.6 g, Rfl: 3     " ciclopirox 8 % SOLN, APPLY THIN LAYER TO TOES DAILY, THEN REMOVE WITH ALCOHOL AFTER 7 DAYS. REPEAT, Disp: 6.6 mL, Rfl: 0     fluticasone-vilanterol (BREO ELLIPTA) 100-25 MCG/INH inhaler, Inhale 1 puff into the lungs daily, Disp: 1 Inhaler, Rfl: 11     losartan (COZAAR) 50 MG tablet, Take 1 tablet (50 mg) by mouth daily, Disp: 90 tablet, Rfl: 3     Multiple Vitamins-Minerals (MULTIVITAMIN) LIQD, , Disp: , Rfl:      UNABLE TO FIND, MEDICATION NAME: OPC-3, Disp: , Rfl:     Allergies -   Allergies   Allergen Reactions     No Known Drug Allergies        Social History -   Social History     Social History     Marital status:      Spouse name: N/A     Number of children: 2     Years of education: N/A     Occupational History           [employer].       [employer]     Social History Main Topics     Smoking status: Former Smoker     Packs/day: 1.00     Years: 15.00     Types: Cigarettes     Quit date: 5/2/1990     Smokeless tobacco: Former User      Comment: quit 16-17 years ago     Alcohol use 0.0 oz/week     0 Standard drinks or equivalent per week      Comment: social - 5 drinks a week     Drug use: No     Sexual activity: Yes     Partners: Male     Other Topics Concern      Service No     Blood Transfusions No     Caffeine Concern No     Occupational Exposure No     Hobby Hazards No     Sleep Concern No     Stress Concern No     Weight Concern No     Special Diet No     Back Care No     Exercise Yes     Bike Helmet No     Seat Belt Yes     Self-Exams Yes     Parent/Sibling W/ Cabg, Mi Or Angioplasty Before 65f 55m? Yes     father     Social History Narrative    Caffeine intake/servings daily - 1-2    Calcium intake/servings daily - 0-1    Exercise 0 times weekly - describe walking, cardio, weights    Sunscreen used - Yes    Seatbelts used - Yes    Guns stored in the home - Yes    Self Breast Exam - No    Pap test up to date -  Yes as of today     Eye exam up to date -  " Yes    Dental exam up to date -  Yes    DEXA scan up to date -  No    Flex Sig/Colonoscopy up to date -  Yes    Mammography up to date -  No    Immunizations reviewed and up to date - Yes    Abuse: Current or Past (Physical, Sexual or Emotional) - No    Do you feel safe in your environment - Yes    Do you cope well with stress - Yes    Do you suffer from insomnia - No        Nicolasa Oneal MA January 13, 2012                           Family History -   Family History   Problem Relation Age of Onset     Cancer Mother         KIDNEY CANCER     Lipids Mother      Glaucoma Mother      Other Cancer Mother      Heart Disease Father      Hypertension Father      Diabetes Father      Neurologic Disorder Father         MIGRAINES     Hypertension Sister      Heart Disease Paternal Grandmother      Cancer Paternal Grandmother         STOMACH CANCER     Cerebrovascular Disease Maternal Grandfather      Cerebrovascular Disease Paternal Grandfather      Diabetes Paternal Grandfather      Asthma Paternal Grandfather      Alzheimer Disease Maternal Grandmother      Lipids Sister      Cerebrovascular Disease Cousin        Review of Systems - As per HPI and PMHx, otherwise 7 system review of the head and neck negative.    Physical Exam  BP (!) 189/93   Pulse 62   Resp 16   Ht 1.664 m (5' 5.5\")   Wt 59 kg (130 lb)   LMP 03/24/2006   SpO2 99%   BMI 21.30 kg/m     General - The patient is in no distress.  Alert and oriented x3, answers questions and cooperates with examination appropriately.   Voice and Breathing - The patient was breathing comfortably without the use of accessory muscles. There was no wheezing, stridor, or stertor.  The patients voice was clear and strong, with no dysphonia.    Eyes - Extraocular movements intact. Sclera were not icteric or injected, conjunctiva were pink and moist. No nystagmus.  Neurologic - Cranial nerves II-XII are grossly intact. Specifically, the facial nerve is intact, House-Brackmann grade 1 " of 6.    Mouth - Examination of the oral cavity showed pink, healthy oral mucosa. No lesions or ulcerations noted.  The tongue was mobile and protrudes midline.   Oropharynx - The walls of the oropharynx were smooth, pink, moist, symmetric, and had no lesions or ulcerations. The uvula was midline and the palate raised symmetrically.   Neck -  Palpation of the occipital, submental, submandibular, internal jugular chain, and supraclavicular nodes did not demonstrate any abnormal lymph nodes or masses. The parotid glands were without masses. Palpation of the thyroid was soft and smooth, with no nodules or goiter appreciated.  The trachea was midline.  Ears - no pain with palpation of the ears, TMJ, parotid, or neck. The auricles appeared normal. The external auditory canals were nonedematous and nonerythematous. The tympanic membranes are normal in appearance, bony landmarks are intact.  No retraction, perforation, or masses.  No fluid or purulence was seen in the external canal or the middle ear.     A/P - Kiersten Garibay is a 58 year old female with left ear pain. However, the ear canal, tympanic membrane, and middle ear appear normal. I still worry this maybe TMJ, but cervical spine disease is also possible.  However given that this is been going on and worsening slowly I want to rule out any sort of tumor in the deep neck or ear.  Therefore I will get a CT neck with contrast.  If this is normal we could consider medication such as gabapentin or topiramate for possible neuropathic pain or headaches.  TMJ is a consideration but she is tried things for this and feels like when she is had this in the past that is different.      Turner Kirkland MD  Otolaryngology  [facility]      Again, thank you for allowing me to participate in the care of your patient.        Sincerely,        Turner Kirkland MD

## 2019-03-14 ENCOUNTER — ANCILLARY PROCEDURE (OUTPATIENT)
Dept: CT IMAGING | Facility: CLINIC | Age: 59
End: 2019-03-14
Attending: OTOLARYNGOLOGY
Payer: COMMERCIAL

## 2019-03-14 DIAGNOSIS — H92.02 OTALGIA, LEFT: ICD-10-CM

## 2019-03-14 PROCEDURE — 70491 CT SOFT TISSUE NECK W/DYE: CPT | Mod: TC

## 2019-03-14 RX ORDER — IOPAMIDOL 755 MG/ML
200 INJECTION, SOLUTION INTRAVASCULAR ONCE
Status: COMPLETED | OUTPATIENT
Start: 2019-03-14 | End: 2019-03-14

## 2019-03-14 RX ADMIN — Medication 50 ML: at 16:47

## 2019-03-14 RX ADMIN — IOPAMIDOL 80 ML: 755 INJECTION, SOLUTION INTRAVASCULAR at 16:47

## 2019-03-20 ENCOUNTER — TELEPHONE (OUTPATIENT)
Dept: OTOLARYNGOLOGY | Facility: CLINIC | Age: 59
End: 2019-03-20

## 2019-03-20 DIAGNOSIS — H92.02 OTALGIA, LEFT: Primary | ICD-10-CM

## 2019-03-20 NOTE — TELEPHONE ENCOUNTER
I spoke with Kiersten over the phone and gave her CT neck results.  It is normal.  I see no cause for her ear pain and sometimes a feeling like something is in her ear.  Fortunately she states over the weekend her pain has gotten better.  Is not fully gone away but has gotten better really unsure what this is.  I think in the differential is broad but includes TMJ, cervical spine disease, referred pain from the pharynx, headache.  She will see a chiropractor.  I offered something such as topiramate or gabapentin.  Certainly if it comes back and becomes intolerable we can try 1 of those.  She will let me know.

## 2019-04-10 ENCOUNTER — TELEPHONE (OUTPATIENT)
Dept: FAMILY MEDICINE | Facility: CLINIC | Age: 59
End: 2019-04-10

## 2019-04-10 DIAGNOSIS — Z12.11 SPECIAL SCREENING FOR MALIGNANT NEOPLASMS, COLON: Primary | ICD-10-CM

## 2019-04-10 NOTE — TELEPHONE ENCOUNTER
Reason for Call:  Other / Colonoscopy question    Detailed comments: Patient called and stated her 10 year colonoscopy is due and she would like to know how to follow up on scheduling it and where she would have to go. Patient also stated since she works, it would be ok to leave a detailed message.    Phone Number Patient can be reached at: Cell number on file:    Telephone Information: [phone]           Best Time: Anytime    Can we leave a detailed message on this number? YES    Call taken on 4/10/2019 at 4:28 PM by Alfreda Haq

## 2019-04-11 NOTE — TELEPHONE ENCOUNTER
Patient asking for referral for colonoscopy.   indicates patient is due 4/20/2019.  Order pended for PCP to review and advise.    Thank you.    Prasanna Loyd RN

## 2019-04-11 NOTE — TELEPHONE ENCOUNTER
Orders Placed This Encounter     GASTROENTEROLOGY ADULT REF PROCEDURE ONLY [facility] [phone]     Number of Visits Requested:   1     \  [facility] [phone]    Notify patient

## 2019-04-11 NOTE — TELEPHONE ENCOUNTER
Called and spoke to man who answered phone. He said he could take a message. Left message with phone number to call to schedule colonoscopy.    Hannah Landrum

## 2019-04-15 ENCOUNTER — TELEPHONE (OUTPATIENT)
Dept: GASTROENTEROLOGY | Facility: CLINIC | Age: 59
End: 2019-04-15

## 2019-04-15 NOTE — TELEPHONE ENCOUNTER
Referring Provider: Barak Childress MD    What is your current height? 5'5    What is your current weight?130    Are you on daily home oxygen? no    Have you had a heart or lung transplant? no      In the past year, have you had any heart related issues  Including cardiomyopathy or a MI within 6 months, that required cardiac stenting or other implantable devices? no    What type of implantable device do you have? no    Do you take nitroglycerin? If yes, how often? no    Are you currently taking any blood thinners?no      (Females) Are you currently pregnant? no  If yes, how many weeks?      Have you had a procedure in the past that was difficult to tolerate with conscious sedation? Any allergies to Fentanyl or Versed no        Do you currently use any of the following?    Are you taking any scheduled prescription narcotics more than once daily? no    Drink alcohol daily? no    Currently using any street drugs or methadone?no    Do you have any history of post-traumatic stress syndrome or mental health issues? no

## 2019-04-22 ENCOUNTER — ANCILLARY PROCEDURE (OUTPATIENT)
Dept: MAMMOGRAPHY | Facility: CLINIC | Age: 59
End: 2019-04-22
Attending: OBSTETRICS & GYNECOLOGY
Payer: COMMERCIAL

## 2019-04-22 DIAGNOSIS — Z12.31 SCREENING MAMMOGRAM, ENCOUNTER FOR: ICD-10-CM

## 2019-04-22 PROCEDURE — 77067 SCR MAMMO BI INCL CAD: CPT

## 2019-04-30 ENCOUNTER — TELEPHONE (OUTPATIENT)
Dept: GASTROENTEROLOGY | Facility: CLINIC | Age: 59
End: 2019-04-30

## 2019-04-30 DIAGNOSIS — Z12.11 SPECIAL SCREENING FOR MALIGNANT NEOPLASMS, COLON: Primary | ICD-10-CM

## 2019-04-30 NOTE — TELEPHONE ENCOUNTER
Order Questions     Question Answer Comment   Procedure Colonoscopy    Purpose of Procedure Screening   Special screening for malignant neoplasms, colon [                Is the patient on the following medications? No blood thinners    Preferred Location [facility] [phone]    Referring MD Barak Childress     ? no    Arrival time verified? May 7th@7:15 am    Facility location verified? [address]; 5th floor    Instructions given regarding prep and procedure    Prep Type Golytely E script sent to [facility] in [place]    Are you taking any anticoagulants or blood thinners? no    Instructions given? Yes,     Electronic implanted devices? no    Pre procedure teaching completed? Yes    Transportation from procedure? Yes, spouse will escort and remain with her 6 hours after procedure    H&P / Pre op physical completed? n/a

## 2019-05-03 ENCOUNTER — TELEPHONE (OUTPATIENT)
Dept: SURGERY | Facility: AMBULATORY SURGERY CENTER | Age: 59
End: 2019-05-03

## 2019-05-07 ENCOUNTER — HOSPITAL ENCOUNTER (OUTPATIENT)
Facility: AMBULATORY SURGERY CENTER | Age: 59
End: 2019-05-07
Attending: INTERNAL MEDICINE
Payer: COMMERCIAL

## 2019-05-07 VITALS
BODY MASS INDEX: 20.89 KG/M2 | DIASTOLIC BLOOD PRESSURE: 84 MMHG | OXYGEN SATURATION: 97 % | SYSTOLIC BLOOD PRESSURE: 128 MMHG | WEIGHT: 130 LBS | RESPIRATION RATE: 16 BRPM | HEART RATE: 60 BPM | TEMPERATURE: 97.4 F | HEIGHT: 66 IN

## 2019-05-07 LAB — COLONOSCOPY: NORMAL

## 2019-05-07 RX ORDER — LIDOCAINE 40 MG/G
CREAM TOPICAL
Status: DISCONTINUED | OUTPATIENT
Start: 2019-05-07 | End: 2019-05-07 | Stop reason: HOSPADM

## 2019-05-07 RX ORDER — FENTANYL CITRATE 50 UG/ML
INJECTION, SOLUTION INTRAMUSCULAR; INTRAVENOUS PRN
Status: DISCONTINUED | OUTPATIENT
Start: 2019-05-07 | End: 2019-05-07 | Stop reason: HOSPADM

## 2019-05-07 RX ORDER — SIMETHICONE
LIQUID (ML) MISCELLANEOUS PRN
Status: DISCONTINUED | OUTPATIENT
Start: 2019-05-07 | End: 2019-05-07 | Stop reason: HOSPADM

## 2019-05-07 RX ORDER — ONDANSETRON 4 MG/1
4 TABLET, ORALLY DISINTEGRATING ORAL EVERY 6 HOURS PRN
Status: DISCONTINUED | OUTPATIENT
Start: 2019-05-07 | End: 2019-05-08 | Stop reason: HOSPADM

## 2019-05-07 RX ORDER — NALOXONE HYDROCHLORIDE 0.4 MG/ML
.1-.4 INJECTION, SOLUTION INTRAMUSCULAR; INTRAVENOUS; SUBCUTANEOUS
Status: DISCONTINUED | OUTPATIENT
Start: 2019-05-07 | End: 2019-05-08 | Stop reason: HOSPADM

## 2019-05-07 RX ORDER — FLUMAZENIL 0.1 MG/ML
0.2 INJECTION, SOLUTION INTRAVENOUS
Status: ACTIVE | OUTPATIENT
Start: 2019-05-07 | End: 2019-05-07

## 2019-05-07 RX ORDER — ONDANSETRON 2 MG/ML
4 INJECTION INTRAMUSCULAR; INTRAVENOUS
Status: COMPLETED | OUTPATIENT
Start: 2019-05-07 | End: 2019-05-07

## 2019-05-07 RX ORDER — ONDANSETRON 2 MG/ML
4 INJECTION INTRAMUSCULAR; INTRAVENOUS EVERY 6 HOURS PRN
Status: DISCONTINUED | OUTPATIENT
Start: 2019-05-07 | End: 2019-05-08 | Stop reason: HOSPADM

## 2019-05-07 RX ORDER — LIDOCAINE HYDROCHLORIDE 20 MG/ML
JELLY TOPICAL PRN
Status: DISCONTINUED | OUTPATIENT
Start: 2019-05-07 | End: 2019-05-07 | Stop reason: HOSPADM

## 2019-05-07 ASSESSMENT — MIFFLIN-ST. JEOR: SCORE: 1168.49

## 2019-06-06 DIAGNOSIS — I10 HYPERTENSION GOAL BP (BLOOD PRESSURE) < 140/90: ICD-10-CM

## 2019-06-07 RX ORDER — LOSARTAN POTASSIUM 50 MG/1
TABLET ORAL
Qty: 90 TABLET | Refills: 3 | Status: SHIPPED | OUTPATIENT
Start: 2019-06-07 | End: 2020-03-09

## 2019-06-07 NOTE — TELEPHONE ENCOUNTER
"Requested Prescriptions   Pending Prescriptions Disp Refills     losartan (COZAAR) 50 MG tablet [Pharmacy Med Name: LOSARTAN POTASSIUM 50MG TABS]  Last Written Prescription Date:  6/18/2018  Last Fill Quantity: 90 tablet,  # refills: 3   Last office visit: 6/18/2018 with prescribing provider:  KIRIT Childress   Future Office Visit:     90 tablet 3     Sig: TAKE ONE TABLET BY MOUTH EVERY DAY       Angiotensin-II Receptors Passed - 6/6/2019  7:35 PM        Passed - Blood pressure under 140/90 in past 12 months     BP Readings from Last 3 Encounters:   05/07/19 128/84   03/13/19 (!) 189/93   02/07/19 (!) 133/91             Passed - Recent (12 mo) or future (30 days) visit within the authorizing provider's specialty     Patient had office visit in the last 12 months or has a visit in the next 30 days with authorizing provider or within the authorizing provider's specialty.  See \"Patient Info\" tab in inbasket, or \"Choose Columns\" in Meds & Orders section of the refill encounter.              Passed - Medication is active on med list        Passed - Patient is age 18 or older        Passed - No active pregnancy on record        Passed - Normal serum creatinine on file in past 12 months     Recent Labs   Lab Test 10/01/18  1155 10/01/18  1136   CR  --  0.86   CREAT 0.8  --              Passed - Normal serum potassium on file in past 12 months     Recent Labs   Lab Test 10/01/18  1136   POTASSIUM 4.0                    Passed - No positive pregnancy test in past 12 months          "

## 2019-06-07 NOTE — TELEPHONE ENCOUNTER
Prescription approved per [facility] Refill Protocol.  Ashleigh Manzano RN

## 2019-09-18 ENCOUNTER — OFFICE VISIT (OUTPATIENT)
Dept: OBGYN | Facility: CLINIC | Age: 59
End: 2019-09-18
Payer: COMMERCIAL

## 2019-09-18 VITALS
TEMPERATURE: 97.6 F | BODY MASS INDEX: 21.63 KG/M2 | DIASTOLIC BLOOD PRESSURE: 80 MMHG | HEART RATE: 88 BPM | SYSTOLIC BLOOD PRESSURE: 130 MMHG | WEIGHT: 132 LBS

## 2019-09-18 DIAGNOSIS — N64.4 BREAST PAIN: ICD-10-CM

## 2019-09-18 DIAGNOSIS — R35.0 URINARY FREQUENCY: ICD-10-CM

## 2019-09-18 DIAGNOSIS — Z00.00 PREVENTATIVE HEALTH CARE: Primary | ICD-10-CM

## 2019-09-18 DIAGNOSIS — Z85.07 HISTORY OF PANCREATIC CANCER: ICD-10-CM

## 2019-09-18 LAB
ALBUMIN UR-MCNC: NEGATIVE MG/DL
APPEARANCE UR: CLEAR
BACTERIA #/AREA URNS HPF: ABNORMAL /HPF
BILIRUB UR QL STRIP: NEGATIVE
COLOR UR AUTO: YELLOW
GLUCOSE UR STRIP-MCNC: NEGATIVE MG/DL
HGB UR QL STRIP: ABNORMAL
KETONES UR STRIP-MCNC: NEGATIVE MG/DL
LEUKOCYTE ESTERASE UR QL STRIP: NEGATIVE
NITRATE UR QL: NEGATIVE
NON-SQ EPI CELLS #/AREA URNS LPF: ABNORMAL /LPF
PH UR STRIP: 7 PH (ref 5–7)
RBC #/AREA URNS AUTO: ABNORMAL /HPF
SOURCE: ABNORMAL
SP GR UR STRIP: <=1.005 (ref 1–1.03)
UROBILINOGEN UR STRIP-ACNC: 0.2 EU/DL (ref 0.2–1)
WBC #/AREA URNS AUTO: ABNORMAL /HPF

## 2019-09-18 PROCEDURE — 99213 OFFICE O/P EST LOW 20 MIN: CPT | Mod: 25 | Performed by: OBSTETRICS & GYNECOLOGY

## 2019-09-18 PROCEDURE — 87086 URINE CULTURE/COLONY COUNT: CPT | Performed by: OBSTETRICS & GYNECOLOGY

## 2019-09-18 PROCEDURE — 81001 URINALYSIS AUTO W/SCOPE: CPT | Performed by: OBSTETRICS & GYNECOLOGY

## 2019-09-18 PROCEDURE — 99396 PREV VISIT EST AGE 40-64: CPT | Performed by: OBSTETRICS & GYNECOLOGY
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
## 2019-09-18 NOTE — PATIENT INSTRUCTIONS
Patient Education     Treating Incontinence in Women: Nonsurgical Methods    The best treatment for you will depend on the type of incontinence you have. Your symptoms, age, and any underlying problems that are found also affect your treatment. While some types of incontinence may eventually require surgery, nonsurgical treatments may be effective in many cases. Nonsurgical treatments include lifestyle changes, muscle-strengthening exercises, and medicines.  Nonsurgical Treatments  Treatment for stress urinary incontinence includes:    Bladder training    Lifestyle changes such as weight loss and increased activity if incontinence is due to being overweight    Medicines, if bladder training has not helped    Pelvic floor muscle exercises  Lifestyle changes    Losing weight. Excess weight puts extra pressure on the pelvic floor muscles. Exercising and eating right can help you lose weight. This helps other treatments work better.    Making certain diet changes. Some foods may make you need to urinate more, so it may be good to avoid them. These include caffeinated drinks and alcohol. Ask your healthcare provider whether these or other diet changes might be helpful.    Quitting smoking. Smoking can lead to a chronic cough that strains pelvic floor muscles. Smoking may also damage the bladder and urethra.  Pelvic floor muscle exercises  There are exercises you can do to help strengthen your pelvic floor muscles. The pelvic floor muscles act as a sling to help hold the bladder and urethra in place. These muscles also help keep the urethra closed. Weak pelvic floor muscles may allow urine to leak. To strengthen the pelvic floor muscles, do the exercises daily. In a few months, the muscles will be stronger and tighter. This can help prevent urine leakage.  Date Last Reviewed: 1/1/2017 2000-2018 [employer]. [address] [place] [address], [place] [address]. All rights reserved. This information is not intended  as a substitute for professional medical care. Always follow your healthcare professional's instructions.

## 2019-09-18 NOTE — NURSING NOTE
"Chief Complaint   Patient presents with     Physical       Initial /80 (BP Location: Right arm, Patient Position: Sitting, Cuff Size: Adult Regular)   Pulse 88   Temp 97.6  F (36.4  C) (Oral)   Wt 59.9 kg (132 lb)   LMP 2006   BMI 21.63 kg/m   Estimated body mass index is 21.63 kg/m  as calculated from the following:    Height as of 19: 1.664 m (5' 5.5\").    Weight as of this encounter: 59.9 kg (132 lb).  BP completed using cuff size: regular    Questioned patient about current smoking habits.  Pt. quit smoking some time ago.          The following HM Due: NONE      The following patient reported/Care Every where data was sent to:  P ABSTRACT QUALITY INITIATIVES [[id number]]  ANTONINO Burk MA           "

## 2019-09-19 ENCOUNTER — TELEPHONE (OUTPATIENT)
Dept: ONCOLOGY | Facility: CLINIC | Age: 59
End: 2019-09-19

## 2019-09-19 LAB
BACTERIA SPEC CULT: NORMAL
SPECIMEN SOURCE: NORMAL

## 2019-09-19 NOTE — TELEPHONE ENCOUNTER
ONCOLOGY INTAKE: Records Information      APPT INFORMATION:  Referring provider:  Dr. Kristine Wagner  Referring provider s clinic:  [facility]  Reason for visit/diagnosis:  Breast pain [N64.4]  Has patient been notified of appointment date and time?: NA    RECORDS INFORMATION:  Were the records received with the referral (via [employer])? Internal Referral    ADDITIONAL INFORMATION:  Left VM with hours and phone. Letter sent for follow up. Referral in Epic

## 2019-09-23 NOTE — TELEPHONE ENCOUNTER
RECORDS STATUS - BREAST    RECORDS REQUESTED FROM:    DATE REQUESTED:    NOTES DETAILS STATUS   OFFICE NOTE from referring provider Kristine Murphy MD - OV 9/18/19   OFFICE NOTE from medical oncologist Luis Antonio Parker   OFFICE NOTE from surgeon NA    OFFICE NOTE from radiation oncologist NA    DISCHARGE SUMMARY from hospital NA    DISCHARGE REPORT from the ER NA    OPERATIVE REPORT Epic 5/7/19: EGD   MEDICATION LIST [facility] 6/7/19   CLINICAL TRIAL TREATMENTS TO DATE     LABS     PATHOLOGY REPORTS  (Tissue diagnosis, Stage, ER/CT percentage positive and intensity of staining, HER2 IHC, FISH, and all biopsies from breast and any distant metastasis)                 NA    GENONOMIC TESTING     TYPE:   (Next Generation Sequencing, including Foundation One testing, and Oncotype score)     IMAGING (NEED IMAGES & REPORT)     CT SCANS PACS    MRI NA    MAMMO PACS 4/22/19, 4/5/18   ULTRASOUND PACS    PET NA    BONE SCAN NA    BRAIN MRI NA

## 2019-09-28 ENCOUNTER — HEALTH MAINTENANCE LETTER (OUTPATIENT)
Age: 59
End: 2019-09-28

## 2019-10-01 ASSESSMENT — ENCOUNTER SYMPTOMS
ARTHRALGIAS: 1
BREAST MASS: 0
NAIL CHANGES: 0
SKIN CHANGES: 1
SINUS PAIN: 1
BACK PAIN: 1
BREAST PAIN: 1
TROUBLE SWALLOWING: 0
MYALGIAS: 0
FLANK PAIN: 0
TASTE DISTURBANCE: 0
SORE THROAT: 0
POOR WOUND HEALING: 0
SINUS CONGESTION: 1
SMELL DISTURBANCE: 0
NECK PAIN: 1
HEMATURIA: 0
MUSCLE CRAMPS: 1
DYSURIA: 0
NECK MASS: 0
HOARSE VOICE: 1
STIFFNESS: 1
DIFFICULTY URINATING: 1
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
## 2019-10-02 ENCOUNTER — OFFICE VISIT (OUTPATIENT)
Dept: SURGERY | Facility: CLINIC | Age: 59
End: 2019-10-02
Attending: OBSTETRICS & GYNECOLOGY
Payer: COMMERCIAL

## 2019-10-02 ENCOUNTER — PRE VISIT (OUTPATIENT)
Dept: SURGERY | Facility: CLINIC | Age: 59
End: 2019-10-02

## 2019-10-02 ENCOUNTER — ANCILLARY PROCEDURE (OUTPATIENT)
Dept: MAMMOGRAPHY | Facility: CLINIC | Age: 59
End: 2019-10-02
Attending: PHYSICIAN ASSISTANT
Payer: COMMERCIAL

## 2019-10-02 VITALS
BODY MASS INDEX: 21.52 KG/M2 | RESPIRATION RATE: 14 BRPM | OXYGEN SATURATION: 100 % | HEIGHT: 66 IN | SYSTOLIC BLOOD PRESSURE: 160 MMHG | DIASTOLIC BLOOD PRESSURE: 83 MMHG | HEART RATE: 66 BPM | TEMPERATURE: 97.1 F | WEIGHT: 133.9 LBS

## 2019-10-02 DIAGNOSIS — N64.4 BREAST PAIN: ICD-10-CM

## 2019-10-02 DIAGNOSIS — N64.4 BREAST PAIN, RIGHT: ICD-10-CM

## 2019-10-02 PROCEDURE — G0463 HOSPITAL OUTPT CLINIC VISIT: HCPCS | Mod: ZF

## 2019-10-02 PROCEDURE — 99203 OFFICE O/P NEW LOW 30 MIN: CPT | Mod: ZP | Performed by: PHYSICIAN ASSISTANT

## 2019-10-02 ASSESSMENT — PAIN SCALES - GENERAL: PAINLEVEL: NO PAIN (0)

## 2019-10-02 ASSESSMENT — MIFFLIN-ST. JEOR: SCORE: 1181.37

## 2019-10-02 NOTE — NURSING NOTE
"Oncology Rooming Note    October 2, 2019 7:54 AM   Kiersten Garibay is a 59 year old female who presents for:    Chief Complaint   Patient presents with     Oncology Clinic Visit     [facility] NEW- BREAST PAIN/ PANCREATIC CA     Initial Vitals: BP (!) 160/83 (BP Location: Right arm, Patient Position: Chair, Cuff Size: Adult Regular)   Pulse 66   Temp 97.1  F (36.2  C) (Oral)   Resp 14   Ht 1.664 m (5' 5.51\")   Wt 60.7 kg (133 lb 14.4 oz)   LMP 03/24/2006   SpO2 100%   BMI 21.94 kg/m   Estimated body mass index is 21.94 kg/m  as calculated from the following:    Height as of this encounter: 1.664 m (5' 5.51\").    Weight as of this encounter: 60.7 kg (133 lb 14.4 oz). Body surface area is 1.68 meters squared.  No Pain (0) Comment: Data Unavailable   Patient's last menstrual period was 03/24/2006.  Allergies reviewed: Yes  Medications reviewed: Yes    Medications: Medication refills not needed today.  Pharmacy name entered into [facility]:    [id number] [place], [place] - [address] AT [facility] & [address] [place] [address] PHARMACY [facility] - [place], [place] - [address] [place] [address] [place] [address]    Clinical concerns: No new concerns. Laura was notified.      Michoacano Restrepo LPN            "

## 2019-10-02 NOTE — PATIENT INSTRUCTIONS
Kiersten Garibay is a 59 year old woman with right breast pain. No concerning findings on breast imaging today.     1) Breast pain  Reviewed images today with the Radiologist and results were discussed with the patient. Discussed unclear etiology for breast pain and that it can be difficult to treat. We discussed its association with breast cancer is low. The following was suggested.   - Pain diary   -  Supportive Bra that was professionally fit. Recommend Soma or [employer]ure Intimate Apparel.    -  Wear a high-intensity sports bra when exercising. Wear while sleeping.   -  Consider eliminating/decrease caffeine (chocolate, tea, coffee, soda) for a two week period of time to see if pain improves/resolves  -  Eat a low fat diet and improve omega 3 fatty acid intake   -  Flax seed 25 grams (2 tablespoons) daily   - Acupuncture   - Maintain a healthy BMI <25.  - Tylenol, ibuprofen, or neproxen    2) Continue yearly screening mammogram (consider tomosynthesis) and clinical encounter. Be familiar with your breast and how they normally feel and appear. Promptly report any changes to your healthcare provider.   - Next screening mammogram due: 4/23/20
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
## 2019-12-03 ENCOUNTER — TELEPHONE (OUTPATIENT)
Dept: FAMILY MEDICINE | Facility: CLINIC | Age: 59
End: 2019-12-03

## 2019-12-03 NOTE — LETTER
[facility]  [address] [place] [id number] [phone]                                                                                                December 3, 2019    Kiersten Garibay  [facility] [place] [id number]        Dear Ms. Garibay,    You are due for an Asthma Control Test. Please complete the enclosed form and send it back to us in the provided envelope. It is important to us to get the ACT score updated every 6 months in order to continue refilling your medications. Thank you. Please call us with any questions or concerns.       Sincerely,      Your Care Team

## 2019-12-03 NOTE — TELEPHONE ENCOUNTER
Panel Management Review      Patient has the following on her problem list:     Asthma review     ACT Total Scores 5/21/2018   ACT TOTAL SCORE -   ASTHMA ER VISITS -   ASTHMA HOSPITALIZATIONS -   ACT TOTAL SCORE (Goal Greater than or Equal to 20) 22   In the past 12 months, how many times did you visit the emergency room for your asthma without being admitted to the hospital? 0   In the past 12 months, how many times were you hospitalized overnight because of your asthma? 0      1. Is Asthma diagnosis on the Problem List? Yes    2. Is Asthma listed on Health Maintenance? Yes    3. Patient is due for:  ACT and AAP      Composite cancer screening  Chart review shows that this patient is due/due soon for the following None  Summary:    Patient is due/failing the following:   AAP and ACT    Action needed:   Patient needs to do ACT.    Type of outreach:    Sent letter. with PARISA    Questions for provider review:    None                                                                                                                                    Maude Mendenhall MA       Chart routed to Care Team .

## 2019-12-13 NOTE — TELEPHONE ENCOUNTER
Panel Management Review  Summary:    Type of outreach:    Phone, left message for patient to call back.     Encounter routed to Care Team.                                                                                                                                 Callie Brown MA

## 2019-12-16 NOTE — TELEPHONE ENCOUNTER
Panel Management Review  Summary:    Type of outreach:    Phone, left message for patient to call back.     Encounter routed to No Action Needed.                                                                                                                               Maude Mendenhall MA

## 2020-03-02 DIAGNOSIS — J45.20 MILD INTERMITTENT ASTHMA WITHOUT COMPLICATION: ICD-10-CM

## 2020-03-06 DIAGNOSIS — J45.20 MILD INTERMITTENT ASTHMA WITHOUT COMPLICATION: ICD-10-CM

## 2020-03-09 ENCOUNTER — TELEPHONE (OUTPATIENT)
Dept: FAMILY MEDICINE | Facility: CLINIC | Age: 60
End: 2020-03-09

## 2020-03-09 DIAGNOSIS — I10 HYPERTENSION GOAL BP (BLOOD PRESSURE) < 140/90: ICD-10-CM

## 2020-03-09 RX ORDER — LOSARTAN POTASSIUM 50 MG/1
50 TABLET ORAL DAILY
Qty: 90 TABLET | Refills: 0 | Status: SHIPPED | OUTPATIENT
Start: 2020-03-09 | End: 2020-05-22

## 2020-03-09 NOTE — TELEPHONE ENCOUNTER
[facility] I have [facility] the 1 remaining [facility] and discontinued the refill at [place].    Lore Carrasco PharmD, [facility]  Pharmacist Manager   [facility]  [phone]

## 2020-05-27 ENCOUNTER — MYC REFILL (OUTPATIENT)
Dept: FAMILY MEDICINE | Facility: CLINIC | Age: 60
End: 2020-05-27

## 2020-05-27 DIAGNOSIS — I10 HYPERTENSION GOAL BP (BLOOD PRESSURE) < 140/90: ICD-10-CM

## 2020-05-27 RX ORDER — LOSARTAN POTASSIUM 50 MG/1
50 TABLET ORAL DAILY
Qty: 90 TABLET | Refills: 0 | Status: CANCELLED | OUTPATIENT
Start: 2020-05-27

## 2020-05-27 NOTE — TELEPHONE ENCOUNTER
Routing to RN. Duplicate encounter. Please un pend order and close encounter.    Hannah Landrum

## 2020-06-06 ENCOUNTER — NURSE TRIAGE (OUTPATIENT)
Dept: NURSING | Facility: CLINIC | Age: 60
End: 2020-06-06

## 2020-06-06 NOTE — TELEPHONE ENCOUNTER
Pt calls in with questions R/T Covid   Pt says she may have been exposed to  Co-worker   with covid   and is wondering if she should be tested     Pt is asymptomatic presently     Pt advised [email or web address]  Or to call clinic when open Monday morning and discuss    Protocol and care advice reviewed  Caller states understanding of the recommended disposition    Advised to call back if further questions or concerns    Jose Chandler RN / [facility]                      Reason for Disposition    [1] Caller requesting NON-URGENT health information AND [2] PCP's office is the best resource    Protocols used: INFORMATION ONLY CALL-A-AH

## 2020-08-11 DIAGNOSIS — C25.1 MALIGNANT NEOPLASM OF BODY OF PANCREAS (H): Primary | ICD-10-CM

## 2020-10-08 ENCOUNTER — ANCILLARY PROCEDURE (OUTPATIENT)
Dept: CT IMAGING | Facility: CLINIC | Age: 60
End: 2020-10-08
Attending: INTERNAL MEDICINE
Payer: COMMERCIAL

## 2020-10-08 VITALS
SYSTOLIC BLOOD PRESSURE: 101 MMHG | OXYGEN SATURATION: 97 % | BODY MASS INDEX: 21.41 KG/M2 | HEART RATE: 74 BPM | WEIGHT: 130.7 LBS | TEMPERATURE: 97.9 F | DIASTOLIC BLOOD PRESSURE: 63 MMHG | RESPIRATION RATE: 18 BRPM

## 2020-10-08 DIAGNOSIS — C25.1 MALIGNANT NEOPLASM OF BODY OF PANCREAS (H): ICD-10-CM

## 2020-10-08 LAB
ALBUMIN SERPL-MCNC: 3.6 G/DL (ref 3.4–5)
ALP SERPL-CCNC: 87 U/L (ref 40–150)
ALT SERPL W P-5'-P-CCNC: 40 U/L (ref 0–50)
ANION GAP SERPL CALCULATED.3IONS-SCNC: 4 MMOL/L (ref 3–14)
AST SERPL W P-5'-P-CCNC: 25 U/L (ref 0–45)
BASOPHILS # BLD AUTO: 0 10E9/L (ref 0–0.2)
BASOPHILS NFR BLD AUTO: 0.7 %
BILIRUB SERPL-MCNC: 0.5 MG/DL (ref 0.2–1.3)
BUN SERPL-MCNC: 11 MG/DL (ref 7–30)
CALCIUM SERPL-MCNC: 8.9 MG/DL (ref 8.5–10.1)
CHLORIDE SERPL-SCNC: 105 MMOL/L (ref 94–109)
CO2 SERPL-SCNC: 28 MMOL/L (ref 20–32)
CREAT SERPL-MCNC: 0.76 MG/DL (ref 0.52–1.04)
DIFFERENTIAL METHOD BLD: NORMAL
EOSINOPHIL # BLD AUTO: 0 10E9/L (ref 0–0.7)
EOSINOPHIL NFR BLD AUTO: 0.5 %
ERYTHROCYTE [DISTWIDTH] IN BLOOD BY AUTOMATED COUNT: 12.5 % (ref 10–15)
GFR SERPL CREATININE-BSD FRML MDRD: 84 ML/MIN/{1.73_M2}
GLUCOSE SERPL-MCNC: 92 MG/DL (ref 70–99)
HCT VFR BLD AUTO: 41 % (ref 35–47)
HGB BLD-MCNC: 13.4 G/DL (ref 11.7–15.7)
IMM GRANULOCYTES # BLD: 0 10E9/L (ref 0–0.4)
IMM GRANULOCYTES NFR BLD: 0.2 %
LYMPHOCYTES # BLD AUTO: 1.3 10E9/L (ref 0.8–5.3)
LYMPHOCYTES NFR BLD AUTO: 31.6 %
MCH RBC QN AUTO: 30.2 PG (ref 26.5–33)
MCHC RBC AUTO-ENTMCNC: 32.7 G/DL (ref 31.5–36.5)
MCV RBC AUTO: 92 FL (ref 78–100)
MONOCYTES # BLD AUTO: 0.3 10E9/L (ref 0–1.3)
MONOCYTES NFR BLD AUTO: 7.5 %
NEUTROPHILS # BLD AUTO: 2.5 10E9/L (ref 1.6–8.3)
NEUTROPHILS NFR BLD AUTO: 59.5 %
NRBC # BLD AUTO: 0 10*3/UL
NRBC BLD AUTO-RTO: 0 /100
PLATELET # BLD AUTO: 233 10E9/L (ref 150–450)
POTASSIUM SERPL-SCNC: 3.7 MMOL/L (ref 3.4–5.3)
PROT SERPL-MCNC: 7.5 G/DL (ref 6.8–8.8)
RBC # BLD AUTO: 4.44 10E12/L (ref 3.8–5.2)
SODIUM SERPL-SCNC: 137 MMOL/L (ref 133–144)
WBC # BLD AUTO: 4.1 10E9/L (ref 4–11)

## 2020-10-08 PROCEDURE — 80053 COMPREHEN METABOLIC PANEL: CPT | Performed by: PATHOLOGY

## 2020-10-08 PROCEDURE — 71260 CT THORAX DX C+: CPT | Mod: GC | Performed by: RADIOLOGY

## 2020-10-08 PROCEDURE — 86316 IMMUNOASSAY TUMOR OTHER: CPT | Performed by: INTERNAL MEDICINE

## 2020-10-08 PROCEDURE — 85025 COMPLETE CBC W/AUTO DIFF WBC: CPT | Performed by: PATHOLOGY

## 2020-10-08 PROCEDURE — 74177 CT ABD & PELVIS W/CONTRAST: CPT | Mod: GC | Performed by: RADIOLOGY

## 2020-10-08 PROCEDURE — 84999 UNLISTED CHEMISTRY PROCEDURE: CPT | Performed by: INTERNAL MEDICINE

## 2020-10-08 RX ORDER — IOPAMIDOL 755 MG/ML
80 INJECTION, SOLUTION INTRAVASCULAR ONCE
Status: COMPLETED | OUTPATIENT
Start: 2020-10-08 | End: 2020-10-08

## 2020-10-08 RX ADMIN — IOPAMIDOL 80 ML: 755 INJECTION, SOLUTION INTRAVASCULAR at 11:20

## 2020-10-08 ASSESSMENT — PAIN SCALES - GENERAL: PAINLEVEL: MILD PAIN (2)

## 2020-10-08 NOTE — NURSING NOTE
Chief Complaint   Patient presents with     Lab Only     PIV placed for labs and CT, vitals checked     Radha Beltran RN on 10/8/2020 at 11:00 AM

## 2020-10-12 ENCOUNTER — VIRTUAL VISIT (OUTPATIENT)
Dept: ONCOLOGY | Facility: CLINIC | Age: 60
End: 2020-10-12
Attending: INTERNAL MEDICINE
Payer: COMMERCIAL

## 2020-10-12 DIAGNOSIS — J47.9 BRONCHIECTASIS WITHOUT COMPLICATION (H): ICD-10-CM

## 2020-10-12 DIAGNOSIS — C25.1 MALIGNANT NEOPLASM OF BODY OF PANCREAS (H): Primary | ICD-10-CM

## 2020-10-12 DIAGNOSIS — J45.40 MODERATE PERSISTENT ASTHMA, UNSPECIFIED WHETHER COMPLICATED: ICD-10-CM

## 2020-10-12 PROCEDURE — 99214 OFFICE O/P EST MOD 30 MIN: CPT | Mod: 95 | Performed by: INTERNAL MEDICINE

## 2020-10-12 PROCEDURE — 999N001193 HC VIDEO/TELEPHONE VISIT; NO CHARGE

## 2020-10-12 NOTE — PROGRESS NOTES
"Kiersten Garibay is a 60 year old female who is being evaluated via a billable video visit.      The patient has been notified of following:     \"This video visit will be conducted via a call between you and your physician/provider. We have found that certain health care needs can be provided without the need for an in-person physical exam.  This service lets us provide the care you need with a video conversation.  If a prescription is necessary we can send it directly to your pharmacy.  If lab work is needed we can place an order for that and you can then stop by our lab to have the test done at a later time.    Video visits are billed at different rates depending on your insurance coverage.  Please reach out to your insurance provider with any questions.    If during the course of the call the physician/provider feels a video visit is not appropriate, you will not be charged for this service.\"    Patient has given verbal consent for Video visit? Yes  How would you like to obtain your AVS? MyChart  If you are dropped from the video visit, the video invite should be resent to: Text to cell phone: [phone]  Will anyone else be joining your video visit? No      Brenda ARREDONDO      Video-Visit Details    Type of service:  Video Visit    Video Start Time: 5:15  Video End Time: 5:45    Originating Location (pt. Location): Home    Distant Location (provider location):  [facility]     Platform used for Video Visit: [facility]    Provider note:    I am seeing Sheila Bolanos today in follow-up of resected neuroendocrine pancreatic cancer.  Our plan video visit today was converted to a telephone visit due to technical difficulties.    She had a central pancreatectomy in 2013 for a stage I grade 2 well-differentiated cancer of her pancreas.  She had no other high risk features and no hormone related symptoms prior to her surgery.  She has been free of evidence of disease since and " returns today for ongoing surveillance.  Since our last visit she generally has been doing well.  She has some chronic problems with mild exertional dyspnea which may be slightly worse.  She has ongoing follow-up with pulmonology.  She occasionally gets some minor left upper quadrant pain that seems relieved with bowel movements and intermittently has some minor discomfort associated with her old incision.  The last few months she has had some very minor lower back pain which does not seem progressive or associated with any limitation in activity.  She has good appetite and energy.  Her weight has been stable.  She has had no change in her bowel habits.  She is noted no adenopathy.  She has noted no rashes.  She has no flushing or palpitations.  The remainder of a 10 point complete review of systems is otherwise negative.    On the phone she sounds cheerful and quite well.    I personally reviewed her imaging studies and have discussed the results with her.  I see nothing that would suggest recurrence of her disease.  She has a small pelvic cyst which is been shown to be simple on prior ultrasounds which is stable.  The parenchymal changes in her lungs appear perhaps slightly worse by the radiologist interpretation.  She has normal electrolytes, renal function, liver enzymes and blood counts.    Assessment/plan #1.  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  She remains free of evidence of disease.  We discussed that while her recurrence risk is not 0, at this point it is low enough that I do not think she warrants further routine oncologic surveillance.  I reviewed at some length with her symptoms that she should bring to our attention in particular unexplained weight loss or unexplained and progressive pain.  We talked about her low back pain quite a bit this seems relatively un concerning to me but I asked that if over the next several months this is getting progressively worse she give  us a call back.  Barring the development of any new symptoms we will leave her further follow-up with us open.  2.  We discussed her respiratory symptoms and CT scan changes and and she is planning on follow-up soon with her pulmonologist.

## 2020-10-12 NOTE — LETTER
"    10/12/2020         RE: Kiersten Garibay  [address] [place] [id number]        Dear Colleague,    Thank you for referring your patient, Kiersten Garibay, to the [facility]. Please see a copy of my visit note below.    Kiersten Garibay is a 60 year old female who is being evaluated via a billable video visit.      The patient has been notified of following:     \"This video visit will be conducted via a call between you and your physician/provider. We have found that certain health care needs can be provided without the need for an in-person physical exam.  This service lets us provide the care you need with a video conversation.  If a prescription is necessary we can send it directly to your pharmacy.  If lab work is needed we can place an order for that and you can then stop by our lab to have the test done at a later time.    Video visits are billed at different rates depending on your insurance coverage.  Please reach out to your insurance provider with any questions.    If during the course of the call the physician/provider feels a video visit is not appropriate, you will not be charged for this service.\"    Patient has given verbal consent for Video visit? Yes  How would you like to obtain your AVS? MyChart  If you are dropped from the video visit, the video invite should be resent to: Text to cell phone: [phone]  Will anyone else be joining your video visit? No      Brenda ARREDONDO      Video-Visit Details    Type of service:  Video Visit    Video Start Time: 5:15  Video End Time: 5:45    Originating Location (pt. Location): Home    Distant Location (provider location):  [facility]     Platform used for Video Visit: [employer]    Provider note:    I am seeing Sheila Bolanos today in follow-up of resected neuroendocrine pancreatic cancer.  Our plan video visit today was converted to a telephone visit due to " technical difficulties.    She had a central pancreatectomy in 2013 for a stage I grade 2 well-differentiated cancer of her pancreas.  She had no other high risk features and no hormone related symptoms prior to her surgery.  She has been free of evidence of disease since and returns today for ongoing surveillance.  Since our last visit she generally has been doing well.  She has some chronic problems with mild exertional dyspnea which may be slightly worse.  She has ongoing follow-up with pulmonology.  She occasionally gets some minor left upper quadrant pain that seems relieved with bowel movements and intermittently has some minor discomfort associated with her old incision.  The last few months she has had some very minor lower back pain which does not seem progressive or associated with any limitation in activity.  She has good appetite and energy.  Her weight has been stable.  She has had no change in her bowel habits.  She is noted no adenopathy.  She has noted no rashes.  She has no flushing or palpitations.  The remainder of a 10 point complete review of systems is otherwise negative.    On the phone she sounds cheerful and quite well.    I personally reviewed her imaging studies and have discussed the results with her.  I see nothing that would suggest recurrence of her disease.  She has a small pelvic cyst which is been shown to be simple on prior ultrasounds which is stable.  The parenchymal changes in her lungs appear perhaps slightly worse by the radiologist interpretation.  She has normal electrolytes, renal function, liver enzymes and blood counts.    Assessment/plan #1.  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  She remains free of evidence of disease.  We discussed that while her recurrence risk is not 0, at this point it is low enough that I do not think she warrants further routine oncologic surveillance.  I reviewed at some length with her symptoms that she should bring  to our attention in particular unexplained weight loss or unexplained and progressive pain.  We talked about her low back pain quite a bit this seems relatively un concerning to me but I asked that if over the next several months this is getting progressively worse she give us a call back.  Barring the development of any new symptoms we will leave her further follow-up with us open.  2.  We discussed her respiratory symptoms and CT scan changes and and she is planning on follow-up soon with her pulmonologist.      Again, thank you for allowing me to participate in the care of your patient.        Sincerely,        Jai Parker MD

## 2020-10-14 ENCOUNTER — ANCILLARY PROCEDURE (OUTPATIENT)
Dept: MAMMOGRAPHY | Facility: CLINIC | Age: 60
End: 2020-10-14
Attending: OBSTETRICS & GYNECOLOGY
Payer: COMMERCIAL

## 2020-10-14 DIAGNOSIS — Z12.31 VISIT FOR SCREENING MAMMOGRAM: ICD-10-CM

## 2020-10-14 PROCEDURE — 77067 SCR MAMMO BI INCL CAD: CPT | Mod: GC | Performed by: STUDENT IN AN ORGANIZED HEALTH CARE EDUCATION/TRAINING PROGRAM

## 2020-10-14 PROCEDURE — 77063 BREAST TOMOSYNTHESIS BI: CPT | Mod: GC | Performed by: STUDENT IN AN ORGANIZED HEALTH CARE EDUCATION/TRAINING PROGRAM

## 2020-10-28 ENCOUNTER — PATIENT OUTREACH (OUTPATIENT)
Dept: ONCOLOGY | Facility: CLINIC | Age: 60
End: 2020-10-28

## 2020-10-28 NOTE — PROGRESS NOTES
RN Care Coordination Note  Received voicemail from patient stating she had questions regarding results of her CT scan. Placed return call to patient to review. Unable to reach patient. Left message, asking patient to return call.       Yolie Davlia RN, BSN, OCN   RN Care Coordinator   [facility]

## 2020-11-03 LAB
CGA SERPL-MCNC: NORMAL NG/ML
LAB SCANNED RESULT: NORMAL

## 2020-11-23 ENCOUNTER — PATIENT OUTREACH (OUTPATIENT)
Dept: ONCOLOGY | Facility: CLINIC | Age: 60
End: 2020-11-23

## 2020-11-23 ENCOUNTER — VIRTUAL VISIT (OUTPATIENT)
Dept: PULMONOLOGY | Facility: CLINIC | Age: 60
End: 2020-11-23
Payer: COMMERCIAL

## 2020-11-23 DIAGNOSIS — J45.40 MODERATE PERSISTENT ASTHMA, UNSPECIFIED WHETHER COMPLICATED: ICD-10-CM

## 2020-11-23 DIAGNOSIS — J47.9 BRONCHIECTASIS WITHOUT COMPLICATION (H): Primary | ICD-10-CM

## 2020-11-23 PROCEDURE — 99214 OFFICE O/P EST MOD 30 MIN: CPT | Mod: 95
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
## 2020-11-23 NOTE — PROGRESS NOTES
"RN Care Coordination Note  Incoming Call: Received call from patient calling to follow-up on CT report which mentions IPMN. RNCC reviewed Dr Parker is reviewing scans in detail to be sure there is nothing being missed. Writer will follow-up with patient with more information.     Patient also reports she has been having lower back pain for several months. Reports she believed it was arthritis but has now become more constant. She also states she had abdominal pain which at \"top of stomach/bottom of breastbone. Happens every time I eat.\" She also reports \"rib pinging pain and right ribs are sore.\" Patient states she was planning to follow-up with PCP. Confirmed with patient PCP would be a good place to start. Follow-up in oncology clinic can be arranged with PCP recommends. Patient voiced understanding and had no other questions at this time.         Yolie Davila RN, BSN, OCN   RN Care Coordinator   [facility]           "

## 2020-11-25 DIAGNOSIS — J45.40 MODERATE PERSISTENT ASTHMA, UNSPECIFIED WHETHER COMPLICATED: ICD-10-CM

## 2020-11-25 DIAGNOSIS — J45.20 MILD INTERMITTENT ASTHMA WITHOUT COMPLICATION: ICD-10-CM

## 2020-11-25 RX ORDER — ALBUTEROL SULFATE 90 UG/1
2 AEROSOL, METERED RESPIRATORY (INHALATION) EVERY 6 HOURS
Qty: 1 INHALER | Refills: 11 | Status: SHIPPED | OUTPATIENT
Start: 2020-11-25 | End: 2022-02-09

## 2021-01-10 ENCOUNTER — HEALTH MAINTENANCE LETTER (OUTPATIENT)
Age: 61
End: 2021-01-10

## 2021-02-14 ASSESSMENT — ENCOUNTER SYMPTOMS
BREAST MASS: 0
PARESTHESIAS: 0
MYALGIAS: 0
JOINT SWELLING: 0
DIZZINESS: 0
NAUSEA: 0
FREQUENCY: 0
PALPITATIONS: 1
ABDOMINAL PAIN: 0
SORE THROAT: 0
CONSTIPATION: 0
HEARTBURN: 1
DYSURIA: 0
CHILLS: 0
SHORTNESS OF BREATH: 0
WEAKNESS: 0
FEVER: 0
HEMATOCHEZIA: 0
ARTHRALGIAS: 0
COUGH: 0
HEADACHES: 0
DIARRHEA: 0
EYE PAIN: 0
NERVOUS/ANXIOUS: 1
HEMATURIA: 0

## 2021-02-16 ENCOUNTER — OFFICE VISIT (OUTPATIENT)
Dept: FAMILY MEDICINE | Facility: CLINIC | Age: 61
End: 2021-02-16
Payer: COMMERCIAL

## 2021-02-16 VITALS
WEIGHT: 133.13 LBS | HEIGHT: 66 IN | OXYGEN SATURATION: 100 % | HEART RATE: 70 BPM | BODY MASS INDEX: 21.4 KG/M2 | SYSTOLIC BLOOD PRESSURE: 130 MMHG | DIASTOLIC BLOOD PRESSURE: 72 MMHG

## 2021-02-16 DIAGNOSIS — I10 HYPERTENSION GOAL BP (BLOOD PRESSURE) < 140/90: ICD-10-CM

## 2021-02-16 DIAGNOSIS — J45.40 MODERATE PERSISTENT ASTHMA, UNSPECIFIED WHETHER COMPLICATED: ICD-10-CM

## 2021-02-16 DIAGNOSIS — R73.03 PREDIABETES: ICD-10-CM

## 2021-02-16 DIAGNOSIS — R73.9 ELEVATED BLOOD SUGAR: ICD-10-CM

## 2021-02-16 DIAGNOSIS — Z00.00 ROUTINE GENERAL MEDICAL EXAMINATION AT A HEALTH CARE FACILITY: Primary | ICD-10-CM

## 2021-02-16 DIAGNOSIS — C25.1 MALIGNANT NEOPLASM OF BODY OF PANCREAS (H): ICD-10-CM

## 2021-02-16 DIAGNOSIS — J47.9 BRONCHIECTASIS WITHOUT COMPLICATION (H): ICD-10-CM

## 2021-02-16 DIAGNOSIS — N64.4 BREAST PAIN, RIGHT: ICD-10-CM

## 2021-02-16 LAB
BASOPHILS # BLD AUTO: 0.1 10E9/L (ref 0–0.2)
BASOPHILS NFR BLD AUTO: 1.2 %
DIFFERENTIAL METHOD BLD: NORMAL
EOSINOPHIL # BLD AUTO: 0.1 10E9/L (ref 0–0.7)
EOSINOPHIL NFR BLD AUTO: 2.4 %
ERYTHROCYTE [DISTWIDTH] IN BLOOD BY AUTOMATED COUNT: 13 % (ref 10–15)
HBA1C MFR BLD: 6.1 % (ref 0–5.6)
HCT VFR BLD AUTO: 40 % (ref 35–47)
HGB BLD-MCNC: 13 G/DL (ref 11.7–15.7)
LYMPHOCYTES # BLD AUTO: 1.3 10E9/L (ref 0.8–5.3)
LYMPHOCYTES NFR BLD AUTO: 32.4 %
MCH RBC QN AUTO: 29.7 PG (ref 26.5–33)
MCHC RBC AUTO-ENTMCNC: 32.5 G/DL (ref 31.5–36.5)
MCV RBC AUTO: 91 FL (ref 78–100)
MONOCYTES # BLD AUTO: 0.4 10E9/L (ref 0–1.3)
MONOCYTES NFR BLD AUTO: 9.9 %
NEUTROPHILS # BLD AUTO: 2.2 10E9/L (ref 1.6–8.3)
NEUTROPHILS NFR BLD AUTO: 54.1 %
PLATELET # BLD AUTO: 245 10E9/L (ref 150–450)
RBC # BLD AUTO: 4.38 10E12/L (ref 3.8–5.2)
WBC # BLD AUTO: 4.1 10E9/L (ref 4–11)

## 2021-02-16 PROCEDURE — 99396 PREV VISIT EST AGE 40-64: CPT | Performed by: FAMILY MEDICINE

## 2021-02-16 PROCEDURE — 85025 COMPLETE CBC W/AUTO DIFF WBC: CPT | Performed by: FAMILY MEDICINE

## 2021-02-16 PROCEDURE — 80053 COMPREHEN METABOLIC PANEL: CPT | Performed by: FAMILY MEDICINE

## 2021-02-16 PROCEDURE — 36415 COLL VENOUS BLD VENIPUNCTURE: CPT | Performed by: FAMILY MEDICINE

## 2021-02-16 PROCEDURE — 83036 HEMOGLOBIN GLYCOSYLATED A1C: CPT | Performed by: FAMILY MEDICINE

## 2021-02-16 PROCEDURE — 99214 OFFICE O/P EST MOD 30 MIN: CPT | Mod: 25 | Performed by: FAMILY MEDICINE

## 2021-02-16 RX ORDER — LOSARTAN POTASSIUM 50 MG/1
50 TABLET ORAL DAILY
Qty: 90 TABLET | Refills: 3 | Status: SHIPPED | OUTPATIENT
Start: 2021-02-16 | End: 2022-05-16

## 2021-02-16 ASSESSMENT — ENCOUNTER SYMPTOMS
NAUSEA: 0
HEMATOCHEZIA: 0
FREQUENCY: 0
DIARRHEA: 0
SHORTNESS OF BREATH: 0
HEADACHES: 0
WEAKNESS: 0
MYALGIAS: 0
FEVER: 0
ABDOMINAL PAIN: 0
NERVOUS/ANXIOUS: 1
BREAST MASS: 0
ARTHRALGIAS: 0
COUGH: 0
HEMATURIA: 0
DYSURIA: 0
PALPITATIONS: 1
CHILLS: 0
SORE THROAT: 0
EYE PAIN: 0
JOINT SWELLING: 0
HEARTBURN: 1
PARESTHESIAS: 0
CONSTIPATION: 0
DIZZINESS: 0

## 2021-02-16 ASSESSMENT — MIFFLIN-ST. JEOR: SCORE: 1172.82
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
## 2021-02-16 NOTE — PATIENT INSTRUCTIONS
Your diabetes mellitus test is elevated, now you are considered prediabetic, please avoid high carb/sugar diet  Follow up in 3 months here to follow up     replens for vaginal dryness    Shingles shot at the pharmacy    Check bp frequently resting, follow up if not elevated    Advised diagnostic mammo for breast pain, wear light sports bra at nigth as well    Vitamin D     Follow up with all specialist as planned    Mammogram Scheduling  [place] [facility] [phone] or [phone]  [place] [facility] [phone]  Patient Education       Preventive Health Recommendations  Female Ages 50 - 64    Yearly exam: See your health care provider every year in order to  o Review health changes.   o Discuss preventive care.    o Review your medicines if your doctor has prescribed any.      Get a Pap test every three years (unless you have an abnormal result and your provider advises testing more often).    If you get Pap tests with HPV test, you only need to test every 5 years, unless you have an abnormal result.     You do not need a Pap test if your uterus was removed (hysterectomy) and you have not had cancer.    You should be tested each year for STDs (sexually transmitted diseases) if you're at risk.     Have a mammogram every 1 to 2 years.    Have a colonoscopy at age 50, or have a yearly FIT test (stool test). These exams screen for colon cancer.      Have a cholesterol test every 5 years, or more often if advised.    Have a diabetes test (fasting glucose) every three years. If you are at risk for diabetes, you should have this test more often.     If you are at risk for osteoporosis (brittle bone disease), think about having a bone density scan (DEXA).    Shots: Get a flu shot each year. Get a tetanus shot every 10 years.    Nutrition:     Eat at least 5 servings of fruits and vegetables each day.    Eat whole-grain bread, whole-wheat pasta and brown rice instead of white grains and rice.    Get adequate Calcium and Vitamin D.      Lifestyle    Exercise at least 150 minutes a week (30 minutes a day, 5 days a week). This will help you control your weight and prevent disease.    Limit alcohol to one drink per day.    No smoking.     Wear sunscreen to prevent skin cancer.     See your dentist every six months for an exam and cleaning.    See your eye doctor every 1 to 2 years.

## 2021-02-17 LAB
ALBUMIN SERPL-MCNC: 3.5 G/DL (ref 3.4–5)
ALP SERPL-CCNC: 90 U/L (ref 40–150)
ALT SERPL W P-5'-P-CCNC: 56 U/L (ref 0–50)
ANION GAP SERPL CALCULATED.3IONS-SCNC: 6 MMOL/L (ref 3–14)
AST SERPL W P-5'-P-CCNC: 25 U/L (ref 0–45)
BILIRUB SERPL-MCNC: 0.4 MG/DL (ref 0.2–1.3)
BUN SERPL-MCNC: 13 MG/DL (ref 7–30)
CALCIUM SERPL-MCNC: 9.8 MG/DL (ref 8.5–10.1)
CHLORIDE SERPL-SCNC: 106 MMOL/L (ref 94–109)
CO2 SERPL-SCNC: 28 MMOL/L (ref 20–32)
CREAT SERPL-MCNC: 0.8 MG/DL (ref 0.52–1.04)
GFR SERPL CREATININE-BSD FRML MDRD: 79 ML/MIN/{1.73_M2}
GLUCOSE SERPL-MCNC: 88 MG/DL (ref 70–99)
POTASSIUM SERPL-SCNC: 3.9 MMOL/L (ref 3.4–5.3)
PROT SERPL-MCNC: 7 G/DL (ref 6.8–8.8)
SODIUM SERPL-SCNC: 140 MMOL/L (ref 133–144)

## 2021-02-17 ASSESSMENT — ASTHMA QUESTIONNAIRES: ACT_TOTALSCORE: 22

## 2021-02-17 NOTE — RESULT ENCOUNTER NOTE
The rest of your labs are essentially normal Please contact me if you have any questions.  Take care,  Jhon Abbott D.O.

## 2021-03-15 ENCOUNTER — ANCILLARY PROCEDURE (OUTPATIENT)
Dept: MAMMOGRAPHY | Facility: CLINIC | Age: 61
End: 2021-03-15
Attending: FAMILY MEDICINE
Payer: COMMERCIAL

## 2021-03-15 DIAGNOSIS — N64.4 BREAST PAIN, RIGHT: ICD-10-CM

## 2021-03-15 PROCEDURE — G0279 TOMOSYNTHESIS, MAMMO: HCPCS | Mod: GC | Performed by: RADIOLOGY

## 2021-03-15 PROCEDURE — 77065 DX MAMMO INCL CAD UNI: CPT | Mod: GC | Performed by: RADIOLOGY

## 2021-03-15 PROCEDURE — 76642 ULTRASOUND BREAST LIMITED: CPT | Mod: RT | Performed by: RADIOLOGY

## 2021-05-18 ENCOUNTER — OFFICE VISIT (OUTPATIENT)
Dept: FAMILY MEDICINE | Facility: CLINIC | Age: 61
End: 2021-05-18
Payer: COMMERCIAL

## 2021-05-18 VITALS
OXYGEN SATURATION: 97 % | SYSTOLIC BLOOD PRESSURE: 132 MMHG | BODY MASS INDEX: 21.21 KG/M2 | HEIGHT: 66 IN | WEIGHT: 132 LBS | HEART RATE: 70 BPM | TEMPERATURE: 98 F | DIASTOLIC BLOOD PRESSURE: 80 MMHG | RESPIRATION RATE: 18 BRPM

## 2021-05-18 DIAGNOSIS — Z13.220 LIPID SCREENING: ICD-10-CM

## 2021-05-18 DIAGNOSIS — I10 HYPERTENSION GOAL BP (BLOOD PRESSURE) < 140/90: ICD-10-CM

## 2021-05-18 DIAGNOSIS — R73.03 PREDIABETES: ICD-10-CM

## 2021-05-18 DIAGNOSIS — I10 ESSENTIAL HYPERTENSION: Primary | ICD-10-CM

## 2021-05-18 LAB
ALBUMIN SERPL-MCNC: 3.3 G/DL (ref 3.4–5)
ALP SERPL-CCNC: 99 U/L (ref 40–150)
ALT SERPL W P-5'-P-CCNC: 41 U/L (ref 0–50)
ANION GAP SERPL CALCULATED.3IONS-SCNC: 5 MMOL/L (ref 3–14)
AST SERPL W P-5'-P-CCNC: 20 U/L (ref 0–45)
BILIRUB SERPL-MCNC: 0.6 MG/DL (ref 0.2–1.3)
BUN SERPL-MCNC: 11 MG/DL (ref 7–30)
CALCIUM SERPL-MCNC: 9.3 MG/DL (ref 8.5–10.1)
CHLORIDE SERPL-SCNC: 105 MMOL/L (ref 94–109)
CHOLEST SERPL-MCNC: 210 MG/DL
CO2 SERPL-SCNC: 29 MMOL/L (ref 20–32)
CREAT SERPL-MCNC: 0.81 MG/DL (ref 0.52–1.04)
GFR SERPL CREATININE-BSD FRML MDRD: 77 ML/MIN/{1.73_M2}
GLUCOSE SERPL-MCNC: 97 MG/DL (ref 70–99)
HBA1C MFR BLD: 6 % (ref 0–5.6)
HDLC SERPL-MCNC: 94 MG/DL
LDLC SERPL CALC-MCNC: 92 MG/DL
NONHDLC SERPL-MCNC: 116 MG/DL
POTASSIUM SERPL-SCNC: 3.4 MMOL/L (ref 3.4–5.3)
PROT SERPL-MCNC: 7.1 G/DL (ref 6.8–8.8)
SODIUM SERPL-SCNC: 139 MMOL/L (ref 133–144)
TRIGL SERPL-MCNC: 121 MG/DL

## 2021-05-18 PROCEDURE — 80061 LIPID PANEL: CPT | Performed by: FAMILY MEDICINE

## 2021-05-18 PROCEDURE — 99213 OFFICE O/P EST LOW 20 MIN: CPT | Performed by: FAMILY MEDICINE

## 2021-05-18 PROCEDURE — 80053 COMPREHEN METABOLIC PANEL: CPT | Performed by: FAMILY MEDICINE

## 2021-05-18 PROCEDURE — 36415 COLL VENOUS BLD VENIPUNCTURE: CPT | Performed by: FAMILY MEDICINE

## 2021-05-18 PROCEDURE — 83036 HEMOGLOBIN GLYCOSYLATED A1C: CPT | Performed by: FAMILY MEDICINE

## 2021-05-18 ASSESSMENT — MIFFLIN-ST. JEOR: SCORE: 1167.72

## 2021-05-18 NOTE — PATIENT INSTRUCTIONS
Your Prediabetes is slightly better, should be rechecked in 6 months  Continue current lifestyle and start exercising    Continue to monitor bp but after sitting for 15 min , if readings are 140-150 then we should have you adjust medication, please send us message if readings are elevated  Jhon Abbott D.O.    Patient Education

## 2021-05-18 NOTE — LETTER
"May 24, 2021      Kiersten Garibay  [address] [place] [id number]        Dear Kiersten,     Your cholesterol is abnormal, please use the recommendations below and recheck labs in 6-12 months.     Ways to improve your cholesterol...     1- Eats less saturated fats (including avoiding \"trans\" fats).     2 - Eat more unsaturated fats  - found in vegetables, grains, and tree nuts.   Also by replacing butter with canola oil or olive oil.     3 - Eat more nuts. 1-2 ounces (a small handful) of almonds, walnuts, hazelnuts or pecans once a day in place of other less healthy snacks.     4 - Eat more high fiber foods - vegetables and whole grains including oat bran, oats, beans, peas, and flax seed.     5 - Eat more fish - such as salmon, tuna, mackerel, and sardines.  1 or 2 six ounce servings per week is a healthy replacement for other proteins.     6 - Exercise for at least 120 minutes per week - which is equal to 30 minutes 4 days per week.           Sincerely,        Jhon Abbott, DO    Results for orders placed or performed in visit on 05/18/21   Lipid panel reflex to direct LDL Fasting     Status: Abnormal   Result Value Ref Range    Cholesterol 210 (H) <200 mg/dL    Triglycerides 121 <150 mg/dL    HDL Cholesterol 94 >49 mg/dL    LDL Cholesterol Calculated 92 <100 mg/dL    Non HDL Cholesterol 116 <130 mg/dL   Comprehensive metabolic panel     Status: Abnormal   Result Value Ref Range    Sodium 139 133 - 144 mmol/L    Potassium 3.4 3.4 - 5.3 mmol/L    Chloride 105 94 - 109 mmol/L    Carbon Dioxide 29 20 - 32 mmol/L    Anion Gap 5 3 - 14 mmol/L    Glucose 97 70 - 99 mg/dL    Urea Nitrogen 11 7 - 30 mg/dL    Creatinine 0.81 0.52 - 1.04 mg/dL    GFR Estimate 77 >60 mL/min/[1.73_m2]    GFR Estimate If Black 90 >60 mL/min/[1.73_m2]    Calcium 9.3 8.5 - 10.1 mg/dL    Bilirubin Total 0.6 0.2 - 1.3 mg/dL    Albumin 3.3 (L) 3.4 - 5.0 g/dL    Protein Total 7.1 6.8 - 8.8 g/dL    Alkaline Phosphatase 99 " 40 - 150 U/L    ALT 41 0 - 50 U/L    AST 20 0 - 45 U/L   Hemoglobin A1c     Status: Abnormal   Result Value Ref Range    Hemoglobin A1C 6.0 (H) 0 - 5.6 %

## 2021-05-18 NOTE — PROGRESS NOTES
"      ICD-10-CM    1. Essential hypertension  I10    2. Hypertension goal BP (blood pressure) < 140/90  I10 Comprehensive metabolic panel   3. Prediabetes  R73.03 Hemoglobin A1c   4. Lipid screening  Z13.220 Lipid panel reflex to direct LDL Fasting     Prediabetes is slightly better, should be rechecked in 6 months  Continue current lifestyle and start exercising    Continue to monitor bp but after sitting for 15 min , if readings are 140-150 then we should have you adjust medication, please send us message if readings are elevated      Subjective   Sheila is a 60 year old who presents for the following health issues  accompanied by her self:    HPI     Patient seen for Physical 2/16/21  Prediabetic at last visit. Here for recheck/Lab work. She is fasting.       Hypertension Follow-up      Do you check your blood pressure regularly outside of the clinic? Yes     Are you following a low salt diet? Yes    Are your blood pressures ever more than 140 on the top number (systolic) OR more   than 90 on the bottom number (diastolic), for example 140/90? Yes, reports readings are \"all over the place\" she brought written documentation of recent results.       covid shot this Friday        Review of Systems   Constitutional, HEENT, cardiovascular, pulmonary, GI, , musculoskeletal, neuro, skin, endocrine and psych systems are negative, except as otherwise noted.      Objective    /80   Pulse 70   Temp 98  F (36.7  C) (Oral)   Resp 18   Ht 1.664 m (5' 5.51\")   Wt 59.9 kg (132 lb)   LMP 03/24/2006   SpO2 97%   BMI 21.63 kg/m    Body mass index is 21.63 kg/m .  Physical Exam   GENERAL: healthy, alert and no distress  NECK: no adenopathy, no asymmetry, masses, or scars and thyroid normal to palpation  RESP: lungs clear to auscultation - no rales, rhonchi or wheezes  CV: regular rate and rhythm, normal S1 S2, no S3 or S4, no murmur, click or rub, no peripheral edema and peripheral pulses strong  ABDOMEN: soft, " nontender, no hepatosplenomegaly, no masses and bowel sounds normal  MS: no gross musculoskeletal defects noted, no edema

## 2021-05-24 NOTE — RESULT ENCOUNTER NOTE
"Your cholesterol is abnormal, please use the recommendations below and recheck labs in 6-12 months.    Ways to improve your cholesterol...    1- Eats less saturated fats (including avoiding \"trans\" fats).    2 - Eat more unsaturated fats  - found in vege  tables, grains, and tree nuts.   Also by replacing butter with canola oil or olive oil.    3 - Eat more nuts.   1-2 ounces (a small handful) of almonds, walnuts, hazelnuts or pecans once a  day in place of other less healthy snacks.    4 - Eat more high   fiber foods - vegetables and whole grains including oat bran, oats, beans, peas, and flax seed.    5 - Eat more fish - such as salmon, tuna, mackerel, and sardines.  1 or 2 six ounce servings per week is a healthy replacement for other proteins.    6 - E  xercise for at least 120 minutes per week - which is equal to 30 minutes 4 days per week.    Jhon Abbott D.O.    "

## 2021-10-18 ENCOUNTER — TELEPHONE (OUTPATIENT)
Dept: PULMONOLOGY | Facility: CLINIC | Age: 61
End: 2021-10-18

## 2021-10-18 NOTE — TELEPHONE ENCOUNTER
Spoke with pt about moving up appt with Dr. Hamm and CICI from 12/27 to 12/14. Pt is agreeable to this and appt details confirmed with pt

## 2021-10-23 ENCOUNTER — HEALTH MAINTENANCE LETTER (OUTPATIENT)
Age: 61
End: 2021-10-23

## 2021-12-11 ASSESSMENT — ENCOUNTER SYMPTOMS
JOINT SWELLING: 0
ABDOMINAL PAIN: 0
MUSCLE CRAMPS: 1
TASTE DISTURBANCE: 0
SINUS CONGESTION: 0
BACK PAIN: 1
ARTHRALGIAS: 0
SHORTNESS OF BREATH: 1
DYSPNEA ON EXERTION: 1
DIARRHEA: 0
SLEEP DISTURBANCES DUE TO BREATHING: 0
HOARSE VOICE: 1
VOMITING: 0
PALPITATIONS: 1
HYPERTENSION: 1
STIFFNESS: 0
WHEEZING: 0
SPUTUM PRODUCTION: 0
HEMOPTYSIS: 0
BLOOD IN STOOL: 0
BOWEL INCONTINENCE: 0
COUGH DISTURBING SLEEP: 0
NECK MASS: 0
SNORES LOUDLY: 0
BLOATING: 0
BREAST MASS: 0
EXERCISE INTOLERANCE: 1
SYNCOPE: 0
SINUS PAIN: 1
SMELL DISTURBANCE: 0
MUSCLE WEAKNESS: 0
COUGH: 0
TROUBLE SWALLOWING: 1
CONSTIPATION: 1
MYALGIAS: 0
ORTHOPNEA: 0
POSTURAL DYSPNEA: 0
SORE THROAT: 0
LIGHT-HEADEDNESS: 0
LEG PAIN: 0
NECK PAIN: 1
BREAST PAIN: 1
HYPOTENSION: 0
NAUSEA: 1
JAUNDICE: 0
RECTAL PAIN: 0
HEARTBURN: 1

## 2021-12-14 ENCOUNTER — ANCILLARY PROCEDURE (OUTPATIENT)
Dept: CT IMAGING | Facility: CLINIC | Age: 61
End: 2021-12-14
Payer: COMMERCIAL

## 2021-12-14 ENCOUNTER — OFFICE VISIT (OUTPATIENT)
Dept: PULMONOLOGY | Facility: CLINIC | Age: 61
End: 2021-12-14
Payer: COMMERCIAL

## 2021-12-14 VITALS
SYSTOLIC BLOOD PRESSURE: 159 MMHG | BODY MASS INDEX: 21.65 KG/M2 | OXYGEN SATURATION: 98 % | WEIGHT: 134.7 LBS | HEART RATE: 74 BPM | HEIGHT: 66 IN | DIASTOLIC BLOOD PRESSURE: 89 MMHG

## 2021-12-14 DIAGNOSIS — J45.40 MODERATE PERSISTENT ASTHMA, UNSPECIFIED WHETHER COMPLICATED: Primary | ICD-10-CM

## 2021-12-14 DIAGNOSIS — J47.9 BRONCHIECTASIS WITHOUT COMPLICATION (H): Primary | ICD-10-CM

## 2021-12-14 DIAGNOSIS — R06.02 SHORTNESS OF BREATH: ICD-10-CM

## 2021-12-14 DIAGNOSIS — J47.9 BRONCHIECTASIS WITHOUT COMPLICATION (H): ICD-10-CM

## 2021-12-14 PROCEDURE — 99214 OFFICE O/P EST MOD 30 MIN: CPT | Mod: 25

## 2021-12-14 PROCEDURE — 71250 CT THORAX DX C-: CPT | Performed by: RADIOLOGY

## 2021-12-14 PROCEDURE — 94060 EVALUATION OF WHEEZING: CPT | Performed by: INTERNAL MEDICINE

## 2021-12-14 PROCEDURE — G0463 HOSPITAL OUTPT CLINIC VISIT: HCPCS | Mod: 25

## 2021-12-14 ASSESSMENT — MIFFLIN-ST. JEOR: SCORE: 1174.81

## 2021-12-14 ASSESSMENT — PAIN SCALES - GENERAL: PAINLEVEL: NO PAIN (0)

## 2021-12-14 NOTE — NURSING NOTE
Chief Complaint   Patient presents with     Follow Up     asthma follow up      Vitals were taken and medications were reconciled.     Amita Gleason RMA  2:27 PM
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
## 2021-12-15 LAB
EXPTIME-PRE: 9.05 SEC
FEF2575-%PRED-POST: 52 %
FEF2575-%PRED-PRE: 50 %
FEF2575-POST: 1.11 L/SEC
FEF2575-PRE: 1.06 L/SEC
FEF2575-PRED: 2.1 L/SEC
FEFMAX-%PRED-PRE: 71 %
FEFMAX-PRE: 4.57 L/SEC
FEFMAX-PRED: 6.35 L/SEC
FEV1-%PRED-PRE: 81 %
FEV1-PRE: 1.92 L
FEV1FEV6-PRE: 65 %
FEV1FEV6-PRED: 80 %
FEV1FVC-PRE: 62 %
FEV1FVC-PRED: 80 %
FIFMAX-PRE: 5.68 L/SEC
FVC-%PRED-PRE: 104 %
FVC-PRE: 3.11 L
FVC-PRED: 2.98 L

## 2021-12-16 ENCOUNTER — TELEPHONE (OUTPATIENT)
Dept: PULMONOLOGY | Facility: CLINIC | Age: 61
End: 2021-12-16
Payer: COMMERCIAL

## 2021-12-16 RX ORDER — ALBUTEROL SULFATE 0.83 MG/ML
2.5 SOLUTION RESPIRATORY (INHALATION) 2 TIMES DAILY
Qty: 180 ML | Refills: 11 | Status: SHIPPED | OUTPATIENT
Start: 2021-12-16 | End: 2023-08-21

## 2021-12-16 RX ORDER — SODIUM CHLORIDE FOR INHALATION 3 %
3 VIAL, NEBULIZER (ML) INHALATION 2 TIMES DAILY
Qty: 180 ML | Refills: 11 | Status: SHIPPED | OUTPATIENT
Start: 2021-12-16 | End: 2022-07-29

## 2021-12-16 NOTE — TELEPHONE ENCOUNTER
----- Message from Stewart Hamm MD sent at 12/16/2021 10:59 AM CST -----  I just spoke with Sheila about her chest CT.  She wants to try nebulized albuterol/saline and try to get a sputum specimen for us.     Can you set her up with a nebulizer, and sputum containers to take home.     Alex

## 2021-12-16 NOTE — TELEPHONE ENCOUNTER
Had spoken with patient to state that [facility] will provide nebulizer. Just left message with her too that they will ship nebulizer to her. Also left message to determine if she has sputum culture cups or if she needs them sent to her.  Stated on message that she can pick them up at pharmacy as well. Left clinic number to call if wanting them mailed to her.    12/16 0278: Patient called and stated she needed sputum cups and instructions. RN left message with household member for patient to call back and left nurse number.

## 2022-01-11 ENCOUNTER — TELEPHONE (OUTPATIENT)
Dept: PULMONOLOGY | Facility: CLINIC | Age: 62
End: 2022-01-11
Payer: COMMERCIAL

## 2022-01-11 NOTE — TELEPHONE ENCOUNTER
Returned pt's VM requesting information on sputum cups and dropping off sputum sample.  Left message with instruction for collecting cups and dropping of sputum at any [facility] location.  Left call back number for questions. Will also send [employer] message.

## 2022-02-08 ENCOUNTER — TELEPHONE (OUTPATIENT)
Dept: FAMILY MEDICINE | Facility: CLINIC | Age: 62
End: 2022-02-08
Payer: COMMERCIAL

## 2022-02-08 NOTE — TELEPHONE ENCOUNTER
Patient Quality Outreach    Patient is due for the following:   Asthma  -  ACT needed  Physical  - Due after 2/16/22    NEXT STEPS:   Schedule a yearly physical    Type of outreach:    Sent [employer] message.      Questions for provider review:    None     Maude Mendenhall CMA

## 2022-02-09 ENCOUNTER — MYC MEDICAL ADVICE (OUTPATIENT)
Dept: PULMONOLOGY | Facility: CLINIC | Age: 62
End: 2022-02-09
Payer: COMMERCIAL

## 2022-02-09 DIAGNOSIS — J45.40 MODERATE PERSISTENT ASTHMA, UNSPECIFIED WHETHER COMPLICATED: ICD-10-CM

## 2022-02-09 RX ORDER — ALBUTEROL SULFATE 90 UG/1
2 AEROSOL, METERED RESPIRATORY (INHALATION) EVERY 6 HOURS
Qty: 18 G | Refills: 5 | Status: SHIPPED | OUTPATIENT
Start: 2022-02-09 | End: 2023-08-03

## 2022-04-09 ENCOUNTER — HEALTH MAINTENANCE LETTER (OUTPATIENT)
Age: 62
End: 2022-04-09

## 2022-04-11 ENCOUNTER — NURSE TRIAGE (OUTPATIENT)
Dept: FAMILY MEDICINE | Facility: CLINIC | Age: 62
End: 2022-04-11
Payer: COMMERCIAL

## 2022-04-11 NOTE — TELEPHONE ENCOUNTER
CARE ADVICE:  MANAGE THIS AT HOME    Patient stated last Wednesday she had episode of sever abdominal pain.    Vomited 5 times brown foul tasting emesis.    No blood.    Denies any other symptoms.    Patient has completely resolved and patient having normal BM's    RN advised patient to manage at home.    Also advised patient if patient has another episode, she should call clinic back or go to ER for immediate evaluation.    Patient verbalized understanding    Reason for Disposition    Mild abdominal pain    Additional Information    Negative: Passed out (i.e., fainted, collapsed and was not responding)    Negative: Shock suspected (e.g., cold/pale/clammy skin, too weak to stand, low BP, rapid pulse)    Negative: Sounds like a life-threatening emergency to the triager    Negative: Chest pain    Negative: Pain is mainly in upper abdomen (if needed ask: 'is it mainly above the belly button?')    Negative: Abdominal pain and pregnant > 20 weeks    Negative: Abdominal pain and pregnant < 20 weeks    Negative: SEVERE abdominal pain (e.g., excruciating)    Negative: Vomiting red blood or black (coffee ground) material    Negative: Bloody, black, or tarry bowel movements (Exception: chronic-unchanged black-grey bowel movements and is taking iron pills or Pepto-bismol)    Negative: Constant abdominal pain lasting > 2 hours    Negative: Vomiting bile (green color)    Negative: Patient sounds very sick or weak to the triager    Negative: Vomiting and abdomen looks much more swollen than usual    Negative: White of the eyes have turned yellow (i.e., jaundice)    Negative: Blood in urine (red, pink, or tea-colored)    Negative: Fever > 103 F (39.4 C)    Negative: Fever > 101 F (38.3 C) and over 60 years of age    Negative: Fever > 100.0 F (37.8 C) and has diabetes mellitus or a weak immune system (e.g., HIV positive, cancer chemotherapy, organ transplant, splenectomy, chronic steroids)    Negative: Fever > 100.0 F (37.8 C) and  "bedridden (e.g., nursing home patient, stroke, chronic illness, recovering from surgery)    Negative: Pregnant or could be pregnant (i.e., missed last menstrual period)    Answer Assessment - Initial Assessment Questions  1. LOCATION: \"Where does it hurt?\"       Entire abdominal area and bottom of ribs  2. RADIATION: \"Does the pain shoot anywhere else?\" (e.g., chest, back)      no  3. ONSET: \"When did the pain begin?\" (e.g., minutes, hours or days ago)       Last Wednesday night   3-4 hours  4. SUDDEN: \"Gradual or sudden onset?\"      Fairly sudden.  Had stomach ached then severe  5. PATTERN \"Does the pain come and go, or is it constant?\"     - If constant: \"Is it getting better, staying the same, or worsening?\"       (Note: Constant means the pain never goes away completely; most serious pain is constant and it progresses)      - If intermittent: \"How long does it last?\" \"Do you have pain now?\"      (Note: Intermittent means the pain goes away completely between bouts)        6. SEVERITY: \"How bad is the pain?\"  (e.g., Scale 1-10; mild, moderate, or severe)    - MILD (1-3): doesn't interfere with normal activities, abdomen soft and not tender to touch     - MODERATE (4-7): interferes with normal activities or awakens from sleep, tender to touch     - SEVERE (8-10): excruciating pain, doubled over, unable to do any normal activities       10/10  7. RECURRENT SYMPTOM: \"Have you ever had this type of abdominal pain before?\" If so, ask: \"When was the last time?\" and \"What happened that time?\"       no  8. CAUSE: \"What do you think is causing the abdominal pain?\"      unsure  9. RELIEVING/AGGRAVATING FACTORS: \"What makes it better or worse?\" (e.g., movement, antacids, bowel movement)        10. OTHER SYMPTOMS: \"Has there been any vomiting, diarrhea, constipation, or urine problems?\"        Vomited brown foul smelling brown    11. PREGNANCY: \"Is there any chance you are pregnant?\" \"When was your last menstrual period?\"    "     Na    Upset stomach most of Thursday    Protocols used: ABDOMINAL PAIN - FEMALE-A-OH

## 2022-04-20 ENCOUNTER — ANCILLARY PROCEDURE (OUTPATIENT)
Dept: MAMMOGRAPHY | Facility: CLINIC | Age: 62
End: 2022-04-20
Attending: FAMILY MEDICINE
Payer: COMMERCIAL

## 2022-04-20 DIAGNOSIS — N64.4 BREAST PAIN, RIGHT: ICD-10-CM

## 2022-04-20 PROCEDURE — 77067 SCR MAMMO BI INCL CAD: CPT | Performed by: RADIOLOGY

## 2022-04-20 PROCEDURE — 77063 BREAST TOMOSYNTHESIS BI: CPT | Performed by: RADIOLOGY

## 2022-06-01 ASSESSMENT — ENCOUNTER SYMPTOMS
DIARRHEA: 0
FEVER: 0
NERVOUS/ANXIOUS: 0
WEAKNESS: 0
DIZZINESS: 0
MYALGIAS: 1
BREAST MASS: 0
EYE PAIN: 0
ABDOMINAL PAIN: 1
DYSURIA: 0
HEADACHES: 0
ARTHRALGIAS: 1
JOINT SWELLING: 0
COUGH: 0
CHILLS: 0
SHORTNESS OF BREATH: 0
HEARTBURN: 1
HEMATURIA: 0
CONSTIPATION: 0
PARESTHESIAS: 0
NAUSEA: 1
HEMATOCHEZIA: 0
PALPITATIONS: 0
SORE THROAT: 0
FREQUENCY: 0

## 2022-06-06 ASSESSMENT — ENCOUNTER SYMPTOMS
MYALGIAS: 1
JOINT SWELLING: 0
HEADACHES: 0
ABDOMINAL PAIN: 1
BREAST MASS: 0
DYSURIA: 0
WEAKNESS: 0
COUGH: 0
FEVER: 0
HEARTBURN: 1
HEMATURIA: 0
PALPITATIONS: 0
CONSTIPATION: 0
EYE PAIN: 0
FREQUENCY: 0
HEMATOCHEZIA: 0
NAUSEA: 1
SORE THROAT: 0
CHILLS: 0
PARESTHESIAS: 0
DIZZINESS: 0
NERVOUS/ANXIOUS: 0
DIARRHEA: 0
SHORTNESS OF BREATH: 0
ARTHRALGIAS: 1

## 2022-06-06 NOTE — PATIENT INSTRUCTIONS
Take omeprazole daily  Cut back on alcohol and caffine  Go to physical therapy for back pain and shoulder pain    Follow up in 1-2 weeks here to recheck     COVID Booster available if interested.   TDAP updated today    If interested in Shingles vaccine please speak with your pharmacy or insurance about coverage of the vaccine.       Patient Education     Preventive Health Recommendations  Female Ages 50 - 64    Yearly exam: See your health care provider every year in order to  Review health changes.   Discuss preventive care.    Review your medicines if your doctor has prescribed any.    Get a Pap test every three years (unless you have an abnormal result and your provider advises testing more often).  If you get Pap tests with HPV test, you only need to test every 5 years, unless you have an abnormal result.   You do not need a Pap test if your uterus was removed (hysterectomy) and you have not had cancer.  You should be tested each year for STDs (sexually transmitted diseases) if you're at risk.   Have a mammogram every 1 to 2 years.  Have a colonoscopy at age 50, or have a yearly FIT test (stool test). These exams screen for colon cancer.    Have a cholesterol test every 5 years, or more often if advised.  Have a diabetes test (fasting glucose) every three years. If you are at risk for diabetes, you should have this test more often.   If you are at risk for osteoporosis (brittle bone disease), think about having a bone density scan (DEXA).    Shots: Get a flu shot each year. Get a tetanus shot every 10 years.    Nutrition:   Eat at least 5 servings of fruits and vegetables each day.  Eat whole-grain bread, whole-wheat pasta and brown rice instead of white grains and rice.  Get adequate Calcium and Vitamin D.     Lifestyle  Exercise at least 150 minutes a week (30 minutes a day, 5 days a week). This will help you control your weight and prevent disease.  Limit alcohol to one drink per day.  No smoking.   Wear  sunscreen to prevent skin cancer.   See your dentist every six months for an exam and cleaning.  See your eye doctor every 1 to 2 years.
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
## 2022-06-07 ENCOUNTER — OFFICE VISIT (OUTPATIENT)
Dept: FAMILY MEDICINE | Facility: CLINIC | Age: 62
End: 2022-06-07
Payer: COMMERCIAL

## 2022-06-07 VITALS
RESPIRATION RATE: 16 BRPM | BODY MASS INDEX: 20.89 KG/M2 | DIASTOLIC BLOOD PRESSURE: 76 MMHG | TEMPERATURE: 98 F | WEIGHT: 130 LBS | HEART RATE: 70 BPM | OXYGEN SATURATION: 100 % | SYSTOLIC BLOOD PRESSURE: 138 MMHG | HEIGHT: 66 IN

## 2022-06-07 DIAGNOSIS — Z85.07 HISTORY OF PANCREATIC CANCER: ICD-10-CM

## 2022-06-07 DIAGNOSIS — M54.50 CHRONIC BILATERAL LOW BACK PAIN WITHOUT SCIATICA: ICD-10-CM

## 2022-06-07 DIAGNOSIS — M25.512 CHRONIC LEFT SHOULDER PAIN: ICD-10-CM

## 2022-06-07 DIAGNOSIS — R73.03 PREDIABETES: ICD-10-CM

## 2022-06-07 DIAGNOSIS — G89.29 CHRONIC BILATERAL LOW BACK PAIN WITHOUT SCIATICA: ICD-10-CM

## 2022-06-07 DIAGNOSIS — J47.9 BRONCHIECTASIS WITHOUT COMPLICATION (H): ICD-10-CM

## 2022-06-07 DIAGNOSIS — C25.1 MALIGNANT NEOPLASM OF BODY OF PANCREAS (H): ICD-10-CM

## 2022-06-07 DIAGNOSIS — Z12.4 CERVICAL CANCER SCREENING: ICD-10-CM

## 2022-06-07 DIAGNOSIS — Z00.00 ROUTINE GENERAL MEDICAL EXAMINATION AT A HEALTH CARE FACILITY: Primary | ICD-10-CM

## 2022-06-07 DIAGNOSIS — R12 HEARTBURN: ICD-10-CM

## 2022-06-07 DIAGNOSIS — N95.2 VAGINAL ATROPHY: ICD-10-CM

## 2022-06-07 DIAGNOSIS — I10 HYPERTENSION GOAL BP (BLOOD PRESSURE) < 140/90: ICD-10-CM

## 2022-06-07 DIAGNOSIS — G89.29 CHRONIC LEFT SHOULDER PAIN: ICD-10-CM

## 2022-06-07 LAB
ALBUMIN SERPL-MCNC: 3.7 G/DL (ref 3.4–5)
ALP SERPL-CCNC: 103 U/L (ref 40–150)
ALT SERPL W P-5'-P-CCNC: 43 U/L (ref 0–50)
ANION GAP SERPL CALCULATED.3IONS-SCNC: 5 MMOL/L (ref 3–14)
AST SERPL W P-5'-P-CCNC: 21 U/L (ref 0–45)
BASOPHILS # BLD AUTO: 0 10E3/UL (ref 0–0.2)
BASOPHILS NFR BLD AUTO: 1 %
BILIRUB SERPL-MCNC: 0.7 MG/DL (ref 0.2–1.3)
BUN SERPL-MCNC: 13 MG/DL (ref 7–30)
CALCIUM SERPL-MCNC: 9.2 MG/DL (ref 8.5–10.1)
CHLORIDE BLD-SCNC: 109 MMOL/L (ref 94–109)
CO2 SERPL-SCNC: 27 MMOL/L (ref 20–32)
CREAT SERPL-MCNC: 0.77 MG/DL (ref 0.52–1.04)
EOSINOPHIL # BLD AUTO: 0.1 10E3/UL (ref 0–0.7)
EOSINOPHIL NFR BLD AUTO: 2 %
ERYTHROCYTE [DISTWIDTH] IN BLOOD BY AUTOMATED COUNT: 13.1 % (ref 10–15)
GFR SERPL CREATININE-BSD FRML MDRD: 86 ML/MIN/1.73M2
GLUCOSE BLD-MCNC: 111 MG/DL (ref 70–99)
HBA1C MFR BLD: 5.9 % (ref 0–5.6)
HCT VFR BLD AUTO: 38.9 % (ref 35–47)
HGB BLD-MCNC: 12.9 G/DL (ref 11.7–15.7)
LIPASE SERPL-CCNC: 126 U/L (ref 73–393)
LYMPHOCYTES # BLD AUTO: 1.2 10E3/UL (ref 0.8–5.3)
LYMPHOCYTES NFR BLD AUTO: 37 %
MCH RBC QN AUTO: 29.8 PG (ref 26.5–33)
MCHC RBC AUTO-ENTMCNC: 33.2 G/DL (ref 31.5–36.5)
MCV RBC AUTO: 90 FL (ref 78–100)
MONOCYTES # BLD AUTO: 0.3 10E3/UL (ref 0–1.3)
MONOCYTES NFR BLD AUTO: 10 %
NEUTROPHILS # BLD AUTO: 1.5 10E3/UL (ref 1.6–8.3)
NEUTROPHILS NFR BLD AUTO: 49 %
PLATELET # BLD AUTO: 232 10E3/UL (ref 150–450)
POTASSIUM BLD-SCNC: 4 MMOL/L (ref 3.4–5.3)
PROT SERPL-MCNC: 7.2 G/DL (ref 6.8–8.8)
RBC # BLD AUTO: 4.33 10E6/UL (ref 3.8–5.2)
SODIUM SERPL-SCNC: 141 MMOL/L (ref 133–144)
WBC # BLD AUTO: 3.1 10E3/UL (ref 4–11)

## 2022-06-07 PROCEDURE — 85025 COMPLETE CBC W/AUTO DIFF WBC: CPT | Performed by: FAMILY MEDICINE

## 2022-06-07 PROCEDURE — 83690 ASSAY OF LIPASE: CPT | Performed by: FAMILY MEDICINE

## 2022-06-07 PROCEDURE — 87624 HPV HI-RISK TYP POOLED RSLT: CPT | Performed by: FAMILY MEDICINE

## 2022-06-07 PROCEDURE — 83036 HEMOGLOBIN GLYCOSYLATED A1C: CPT | Performed by: FAMILY MEDICINE

## 2022-06-07 PROCEDURE — 90715 TDAP VACCINE 7 YRS/> IM: CPT | Performed by: FAMILY MEDICINE

## 2022-06-07 PROCEDURE — 90471 IMMUNIZATION ADMIN: CPT | Performed by: FAMILY MEDICINE

## 2022-06-07 PROCEDURE — 99214 OFFICE O/P EST MOD 30 MIN: CPT | Mod: 25 | Performed by: FAMILY MEDICINE

## 2022-06-07 PROCEDURE — 36415 COLL VENOUS BLD VENIPUNCTURE: CPT | Performed by: FAMILY MEDICINE

## 2022-06-07 PROCEDURE — G0123 SCREEN CERV/VAG THIN LAYER: HCPCS | Performed by: FAMILY MEDICINE

## 2022-06-07 PROCEDURE — 99396 PREV VISIT EST AGE 40-64: CPT | Mod: 25 | Performed by: FAMILY MEDICINE

## 2022-06-07 PROCEDURE — 80053 COMPREHEN METABOLIC PANEL: CPT | Performed by: FAMILY MEDICINE

## 2022-06-07 RX ORDER — LOSARTAN POTASSIUM 50 MG/1
50 TABLET ORAL DAILY
Qty: 90 TABLET | Refills: 0 | Status: SHIPPED | OUTPATIENT
Start: 2022-06-07 | End: 2022-06-07

## 2022-06-07 RX ORDER — OMEPRAZOLE 40 MG/1
40 CAPSULE, DELAYED RELEASE ORAL DAILY
Qty: 30 CAPSULE | Refills: 0 | Status: SHIPPED | OUTPATIENT
Start: 2022-06-07 | End: 2022-08-02

## 2022-06-07 RX ORDER — LOSARTAN POTASSIUM 50 MG/1
50 TABLET ORAL DAILY
Qty: 90 TABLET | Refills: 3 | Status: SHIPPED | OUTPATIENT
Start: 2022-06-07 | End: 2022-10-31

## 2022-06-07 ASSESSMENT — ASTHMA QUESTIONNAIRES
QUESTION_5 LAST FOUR WEEKS HOW WOULD YOU RATE YOUR ASTHMA CONTROL: WELL CONTROLLED
QUESTION_3 LAST FOUR WEEKS HOW OFTEN DID YOUR ASTHMA SYMPTOMS (WHEEZING, COUGHING, SHORTNESS OF BREATH, CHEST TIGHTNESS OR PAIN) WAKE YOU UP AT NIGHT OR EARLIER THAN USUAL IN THE MORNING: NOT AT ALL
ACT_TOTALSCORE: 20
ACT_TOTALSCORE: 20
QUESTION_4 LAST FOUR WEEKS HOW OFTEN HAVE YOU USED YOUR RESCUE INHALER OR NEBULIZER MEDICATION (SUCH AS ALBUTEROL): TWO OR THREE TIMES PER WEEK
QUESTION_1 LAST FOUR WEEKS HOW MUCH OF THE TIME DID YOUR ASTHMA KEEP YOU FROM GETTING AS MUCH DONE AT WORK, SCHOOL OR AT HOME: A LITTLE OF THE TIME
QUESTION_2 LAST FOUR WEEKS HOW OFTEN HAVE YOU HAD SHORTNESS OF BREATH: ONCE OR TWICE A WEEK

## 2022-06-07 ASSESSMENT — PAIN SCALES - GENERAL: PAINLEVEL: MILD PAIN (2)

## 2022-06-07 NOTE — LETTER
June 9, 2022      Kiersten Esquivelman  [address] [place] [id number]        Dear Kiersten,       Your recent lab results were within normal limits. A copy of those results are included with this letter.      Sincerely,        Jhon Abbott, DO    Results for orders placed or performed in visit on 06/07/22   Hemoglobin A1c     Status: Abnormal   Result Value Ref Range    Hemoglobin A1C 5.9 (H) 0.0 - 5.6 %   Comprehensive metabolic panel (BMP + Alb, Alk Phos, ALT, AST, Total. Bili, TP)     Status: Abnormal   Result Value Ref Range    Sodium 141 133 - 144 mmol/L    Potassium 4.0 3.4 - 5.3 mmol/L    Chloride 109 94 - 109 mmol/L    Carbon Dioxide (CO2) 27 20 - 32 mmol/L    Anion Gap 5 3 - 14 mmol/L    Urea Nitrogen 13 7 - 30 mg/dL    Creatinine 0.77 0.52 - 1.04 mg/dL    Calcium 9.2 8.5 - 10.1 mg/dL    Glucose 111 (H) 70 - 99 mg/dL    Alkaline Phosphatase 103 40 - 150 U/L    AST 21 0 - 45 U/L    ALT 43 0 - 50 U/L    Protein Total 7.2 6.8 - 8.8 g/dL    Albumin 3.7 3.4 - 5.0 g/dL    Bilirubin Total 0.7 0.2 - 1.3 mg/dL    GFR Estimate 86 >60 mL/min/1.73m2   Lipase     Status: Normal   Result Value Ref Range    Lipase 126 73 - 393 U/L   CBC with platelets and differential     Status: Abnormal   Result Value Ref Range    WBC Count 3.1 (L) 4.0 - 11.0 10e3/uL    RBC Count 4.33 3.80 - 5.20 10e6/uL    Hemoglobin 12.9 11.7 - 15.7 g/dL    Hematocrit 38.9 35.0 - 47.0 %    MCV 90 78 - 100 fL    MCH 29.8 26.5 - 33.0 pg    MCHC 33.2 31.5 - 36.5 g/dL    RDW 13.1 10.0 - 15.0 %    Platelet Count 232 150 - 450 10e3/uL    % Neutrophils 49 %    % Lymphocytes 37 %    % Monocytes 10 %    % Eosinophils 2 %    % Basophils 1 %    Absolute Neutrophils 1.5 (L) 1.6 - 8.3 10e3/uL    Absolute Lymphocytes 1.2 0.8 - 5.3 10e3/uL    Absolute Monocytes 0.3 0.0 - 1.3 10e3/uL    Absolute Eosinophils 0.1 0.0 - 0.7 10e3/uL    Absolute Basophils 0.0 0.0 - 0.2 10e3/uL   CBC with platelets and differential     Status: Abnormal    Narrative     The following orders were created for panel order CBC with platelets and differential.  Procedure                               Abnormality         Status                     ---------                               -----------         ------                     CBC with platelets and d...[[id number]]  Abnormal            Final result                 Please view results for these tests on the individual orders.

## 2022-06-10 LAB
BKR LAB AP GYN ADEQUACY: NORMAL
BKR LAB AP GYN INTERPRETATION: NORMAL
BKR LAB AP HPV REFLEX: NORMAL
BKR LAB AP PREVIOUS ABNL DX: NORMAL
BKR LAB AP PREVIOUS ABNORMAL: NORMAL
PATH REPORT.COMMENTS IMP SPEC: NORMAL
PATH REPORT.COMMENTS IMP SPEC: NORMAL
PATH REPORT.RELEVANT HX SPEC: NORMAL

## 2022-06-13 LAB
HUMAN PAPILLOMA VIRUS 16 DNA: NEGATIVE
HUMAN PAPILLOMA VIRUS 18 DNA: NEGATIVE
HUMAN PAPILLOMA VIRUS FINAL DIAGNOSIS: NORMAL
HUMAN PAPILLOMA VIRUS OTHER HR: NEGATIVE

## 2022-06-14 NOTE — PATIENT INSTRUCTIONS
Follow up with physical therapy as advised for all musculoskeletal issues, follow up in 8-12 weeks    Xray showing curved spine, no fractures or acute changes see, awaiting radiology read today.     I am glad GI issues are resolving    Advised follow up with oncology as well    Predm is stable-slightly abnormal wbc-follow up as planned in 3 months     Vaginal cream sent, follow up in 3 months as well for recheck  Labs ordered as well  Take care  Jhon Abbott D.O.      Patient Education
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
## 2022-06-16 ENCOUNTER — OFFICE VISIT (OUTPATIENT)
Dept: FAMILY MEDICINE | Facility: CLINIC | Age: 62
End: 2022-06-16

## 2022-06-16 ENCOUNTER — ANCILLARY PROCEDURE (OUTPATIENT)
Dept: GENERAL RADIOLOGY | Facility: CLINIC | Age: 62
End: 2022-06-16
Attending: FAMILY MEDICINE
Payer: COMMERCIAL

## 2022-06-16 VITALS
BODY MASS INDEX: 21.11 KG/M2 | OXYGEN SATURATION: 99 % | DIASTOLIC BLOOD PRESSURE: 72 MMHG | TEMPERATURE: 98 F | HEART RATE: 71 BPM | RESPIRATION RATE: 16 BRPM | WEIGHT: 131.38 LBS | SYSTOLIC BLOOD PRESSURE: 124 MMHG | HEIGHT: 66 IN

## 2022-06-16 DIAGNOSIS — M54.50 LOW BACK PAIN WITHOUT SCIATICA, UNSPECIFIED BACK PAIN LATERALITY, UNSPECIFIED CHRONICITY: Primary | ICD-10-CM

## 2022-06-16 DIAGNOSIS — R79.89 ABNORMAL CBC: ICD-10-CM

## 2022-06-16 DIAGNOSIS — M79.662 PAIN OF LEFT LOWER LEG: ICD-10-CM

## 2022-06-16 DIAGNOSIS — R73.03 PREDIABETES: ICD-10-CM

## 2022-06-16 DIAGNOSIS — K21.00 GASTROESOPHAGEAL REFLUX DISEASE WITH ESOPHAGITIS, UNSPECIFIED WHETHER HEMORRHAGE: ICD-10-CM

## 2022-06-16 DIAGNOSIS — M25.512 LEFT SHOULDER PAIN, UNSPECIFIED CHRONICITY: ICD-10-CM

## 2022-06-16 DIAGNOSIS — N95.2 ATROPHY OF VAGINA: ICD-10-CM

## 2022-06-16 PROCEDURE — 73590 X-RAY EXAM OF LOWER LEG: CPT | Mod: TC | Performed by: RADIOLOGY

## 2022-06-16 PROCEDURE — 99214 OFFICE O/P EST MOD 30 MIN: CPT | Performed by: FAMILY MEDICINE

## 2022-06-16 RX ORDER — ESTRADIOL 0.1 MG/G
2 CREAM VAGINAL
Qty: 42.5 G | Refills: 3 | Status: SHIPPED | OUTPATIENT
Start: 2022-06-16 | End: 2022-12-08

## 2022-06-16 ASSESSMENT — PAIN SCALES - GENERAL: PAINLEVEL: MODERATE PAIN (4)

## 2022-06-27 ENCOUNTER — THERAPY VISIT (OUTPATIENT)
Dept: PHYSICAL THERAPY | Facility: CLINIC | Age: 62
End: 2022-06-27
Attending: FAMILY MEDICINE
Payer: COMMERCIAL

## 2022-06-27 DIAGNOSIS — G89.29 CHRONIC LEFT SHOULDER PAIN: ICD-10-CM

## 2022-06-27 DIAGNOSIS — G89.29 CHRONIC BILATERAL LOW BACK PAIN WITHOUT SCIATICA: ICD-10-CM

## 2022-06-27 DIAGNOSIS — M25.512 CHRONIC LEFT SHOULDER PAIN: ICD-10-CM

## 2022-06-27 DIAGNOSIS — M25.512 LEFT SHOULDER PAIN: ICD-10-CM

## 2022-06-27 DIAGNOSIS — M54.50 CHRONIC BILATERAL LOW BACK PAIN WITHOUT SCIATICA: ICD-10-CM

## 2022-06-27 PROCEDURE — 97110 THERAPEUTIC EXERCISES: CPT | Mod: GP | Performed by: PHYSICAL THERAPIST

## 2022-06-27 PROCEDURE — 97161 PT EVAL LOW COMPLEX 20 MIN: CPT | Mod: GP | Performed by: PHYSICAL THERAPIST

## 2022-06-27 PROCEDURE — 97112 NEUROMUSCULAR REEDUCATION: CPT | Mod: GP | Performed by: PHYSICAL THERAPIST
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
## 2022-06-28 ENCOUNTER — TELEPHONE (OUTPATIENT)
Dept: ONCOLOGY | Facility: CLINIC | Age: 62
End: 2022-06-28

## 2022-06-28 NOTE — TELEPHONE ENCOUNTER
Pt is a 61 year old female with hx of malignant neoplasm of body of pancrease, she had resected neuroendocrine pancreatic cancer     Last clinic visit 10/12/20    Pt reports since the beginning of the year she started having heartburn symptoms abdominal pain (between the umblicus to the chest) more in the left qudarant pain. Pain is worse after eating. Hasloose stools sometimes, no blood in stools. Had one episodes of vomiting (foul brown vomiting ~ 3 months ago)   And mild nausea after eating  Has has back pain but pain has worsened since the beginning of the year. She has scheduled PT tomorrow.     Denies difficulty swallowing, no weigh loss, no fever, No hx of h.polyri or diverticulitis     She reports that symptoms has resolved after PCP prescribed omperazole for 30 days. She was advised bt PCP to follow up with oncology team.     Pagechelsea Parker, recommended to schedule a follow up visit with pt   Message sent to  to call pt and arrange time.   Writer called pt and informed of above plan. Advised pt to call back if symptoms worsen.     Pt voiced understanding and agreement with plan.

## 2022-06-29 ENCOUNTER — THERAPY VISIT (OUTPATIENT)
Dept: PHYSICAL THERAPY | Facility: CLINIC | Age: 62
End: 2022-06-29
Payer: COMMERCIAL

## 2022-06-29 DIAGNOSIS — M54.50 BILATERAL LOW BACK PAIN WITHOUT SCIATICA: Primary | ICD-10-CM

## 2022-06-29 DIAGNOSIS — M54.40 BILATERAL LOW BACK PAIN WITH SCIATICA: ICD-10-CM

## 2022-06-29 PROCEDURE — 97112 NEUROMUSCULAR REEDUCATION: CPT | Mod: GP | Performed by: PHYSICAL THERAPIST

## 2022-06-29 PROCEDURE — 97110 THERAPEUTIC EXERCISES: CPT | Mod: GP | Performed by: PHYSICAL THERAPIST

## 2022-06-29 PROCEDURE — 97161 PT EVAL LOW COMPLEX 20 MIN: CPT | Mod: GP | Performed by: PHYSICAL THERAPIST
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
## 2022-07-03 PROBLEM — M54.40 BILATERAL LOW BACK PAIN WITH SCIATICA: Status: ACTIVE | Noted: 2022-07-03

## 2022-07-13 ENCOUNTER — THERAPY VISIT (OUTPATIENT)
Dept: PHYSICAL THERAPY | Facility: CLINIC | Age: 62
End: 2022-07-13
Payer: COMMERCIAL

## 2022-07-13 DIAGNOSIS — M54.40 BILATERAL LOW BACK PAIN WITH SCIATICA: Primary | ICD-10-CM

## 2022-07-13 DIAGNOSIS — M25.512 LEFT SHOULDER PAIN: ICD-10-CM

## 2022-07-13 PROCEDURE — 97112 NEUROMUSCULAR REEDUCATION: CPT | Mod: GP | Performed by: PHYSICAL THERAPIST

## 2022-07-13 PROCEDURE — 97110 THERAPEUTIC EXERCISES: CPT | Mod: GP | Performed by: PHYSICAL THERAPIST

## 2022-07-29 DIAGNOSIS — J47.9 BRONCHIECTASIS WITHOUT COMPLICATION (H): ICD-10-CM

## 2022-07-29 RX ORDER — SODIUM CHLORIDE FOR INHALATION 3 %
3 VIAL, NEBULIZER (ML) INHALATION 2 TIMES DAILY
Qty: 180 ML | Refills: 5 | Status: SHIPPED | OUTPATIENT
Start: 2022-07-29 | End: 2022-08-05

## 2022-08-01 ENCOUNTER — ONCOLOGY VISIT (OUTPATIENT)
Dept: ONCOLOGY | Facility: CLINIC | Age: 62
End: 2022-08-01
Attending: INTERNAL MEDICINE
Payer: COMMERCIAL

## 2022-08-01 VITALS
RESPIRATION RATE: 16 BRPM | BODY MASS INDEX: 21.3 KG/M2 | SYSTOLIC BLOOD PRESSURE: 143 MMHG | HEART RATE: 84 BPM | WEIGHT: 130 LBS | OXYGEN SATURATION: 96 % | DIASTOLIC BLOOD PRESSURE: 87 MMHG | TEMPERATURE: 97.8 F

## 2022-08-01 DIAGNOSIS — C25.1 MALIGNANT NEOPLASM OF BODY OF PANCREAS (H): Primary | ICD-10-CM

## 2022-08-01 PROCEDURE — 99214 OFFICE O/P EST MOD 30 MIN: CPT | Performed by: INTERNAL MEDICINE

## 2022-08-01 PROCEDURE — G0463 HOSPITAL OUTPT CLINIC VISIT: HCPCS

## 2022-08-01 ASSESSMENT — PAIN SCALES - GENERAL: PAINLEVEL: NO PAIN (0)

## 2022-08-01 NOTE — LETTER
8/1/2022         RE: Kiersten Massey  [address] [facility] [id number]      Sheila Massey returns today in follow-up of resected pancreatic neuroendocrine carcinoma.    She is a 62-year-old woman who had a central pancreatectomy in 2013 for a stage I, grade 2 well-differentiated neuroendocrine cancer of her pancreas.  She had no hormone related symptoms prior to her surgery and had been free of evidence of disease since.  I am seeing her today at her request that she has had some new abdominal symptoms that were of concern to her.  She describes a lot of reflux symptoms though's of gotten better with omeprazole.  She is also had some significant upper abdominal pains that may or may not be related to meals.  She is having episodes of diarrhea perhaps once or twice per week with no clear association with particular foods.  She had 1 episode that lasted for about 24 hours of severe vomiting and diarrhea couple of months ago.  Her chronic low back pain is about the same or better since she has been doing some physical therapy.  Her chronic respiratory symptoms perhaps related to bronchiectasis are similar to the past.    On physical exam she is alert and well-appearing with unremarkable vital signs except for mild hypertension with a blood pressure of 143/87.  She has no icterus.  She has no adenopathy palpable in the neck or supra torsten spaces.  Her lungs are clear to auscultation without dullness to percussion.  Her heart rate and rhythm are regular without audible murmur or gallop and the jugular venous pressure appears normal.  Her abdomen is soft and nontender without mass or organomegaly.  She has no demonstrable ascites.  She has no peripheral edema and no tenderness in her calves or thighs.  Her speech is fluent and her cranial nerves are grossly intact.  Her gait and station are unremarkable.    Assessment/plan: History of resected intermediate grade well differentiated neuroendocrine  carcinoma of the pancreas.  She has no clear findings on physical exam to suggest recurrent disease.  Her descriptions of her symptoms are not too concerning but there is enough worry here that I think we should pursue further evaluation to be sure she has not had recurrence of her disease.  We will be getting some lab work to include a chromogranin A and a CT scan and I will follow-up with her on those results once they are available.        Jai Parker MD

## 2022-08-01 NOTE — NURSING NOTE
"Oncology Rooming Note    August 1, 2022 5:12 PM   Kiersten Massey is a 62 year old female who presents for:    Chief Complaint   Patient presents with     Oncology Clinic Visit     NeuroendocrineTumor      Initial Vitals: BP (!) 158/96   Pulse 84   Temp 97.8  F (36.6  C) (Oral)   Resp 16   Wt 59 kg (130 lb)   LMP 03/24/2006   SpO2 96%   BMI 21.30 kg/m   Estimated body mass index is 21.3 kg/m  as calculated from the following:    Height as of 6/16/22: 1.664 m (5' 5.5\").    Weight as of this encounter: 59 kg (130 lb). Body surface area is 1.65 meters squared.  No Pain (0) Comment: Data Unavailable   Patient's last menstrual period was 03/24/2006.  Allergies reviewed: Yes  Medications reviewed: Yes    Medications: Medication refills not needed today.  Pharmacy name entered into [facility]:    [id number] - [place], [place] - [address] AT [facility] [address] [place] [address] [facility]ARMACY [facility] [place], [place] [address] [facility] [place] - [place], [place] - [address] [facility].  [facility] - [place], [place] - [address]    Clinical concerns:        Kate Aburto CMA                          "

## 2022-08-01 NOTE — Clinical Note
8/1/2022         RE: Kiersten Massey  [address] [place] [id number]        Dear Colleague,    Thank you for referring your patient, Kiersten Massey, to the [facility]. Please see a copy of my visit note below.    No notes on file    Again, thank you for allowing me to participate in the care of your patient.        Sincerely,        Jai Parker MD

## 2022-08-02 NOTE — PROGRESS NOTES
Sheila Massey returns today in follow-up of resected pancreatic neuroendocrine carcinoma.    She is a 62-year-old woman who had a central pancreatectomy in 2013 for a stage I, grade 2 well-differentiated neuroendocrine cancer of her pancreas.  She had no hormone related symptoms prior to her surgery and had been free of evidence of disease since.  I am seeing her today at her request that she has had some new abdominal symptoms that were of concern to her.  She describes a lot of reflux symptoms though's of gotten better with omeprazole.  She is also had some significant upper abdominal pains that may or may not be related to meals.  She is having episodes of diarrhea perhaps once or twice per week with no clear association with particular foods.  She had 1 episode that lasted for about 24 hours of severe vomiting and diarrhea couple of months ago.  Her chronic low back pain is about the same or better since she has been doing some physical therapy.  Her chronic respiratory symptoms perhaps related to bronchiectasis are similar to the past.    On physical exam she is alert and well-appearing with unremarkable vital signs except for mild hypertension with a blood pressure of 143/87.  She has no icterus.  She has no adenopathy palpable in the neck or supra torsten spaces.  Her lungs are clear to auscultation without dullness to percussion.  Her heart rate and rhythm are regular without audible murmur or gallop and the jugular venous pressure appears normal.  Her abdomen is soft and nontender without mass or organomegaly.  She has no demonstrable ascites.  She has no peripheral edema and no tenderness in her calves or thighs.  Her speech is fluent and her cranial nerves are grossly intact.  Her gait and station are unremarkable.    Assessment/plan: History of resected intermediate grade well differentiated neuroendocrine carcinoma of the pancreas.  She has no clear findings on physical exam to suggest recurrent disease.   Her descriptions of her symptoms are not too concerning but there is enough worry here that I think we should pursue further evaluation to be sure she has not had recurrence of her disease.  We will be getting some lab work to include a chromogranin A and a CT scan and I will follow-up with her on those results once they are available.

## 2022-08-05 DIAGNOSIS — J47.9 BRONCHIECTASIS WITHOUT COMPLICATION (H): ICD-10-CM

## 2022-08-05 RX ORDER — SODIUM CHLORIDE FOR INHALATION 3 %
3 VIAL, NEBULIZER (ML) INHALATION 2 TIMES DAILY
Qty: 180 ML | Refills: 5 | Status: SHIPPED | OUTPATIENT
Start: 2022-08-05 | End: 2023-08-21

## 2022-08-09 ENCOUNTER — LAB (OUTPATIENT)
Dept: LAB | Facility: CLINIC | Age: 62
End: 2022-08-09

## 2022-08-09 ENCOUNTER — ANCILLARY PROCEDURE (OUTPATIENT)
Dept: CT IMAGING | Facility: CLINIC | Age: 62
End: 2022-08-09
Attending: INTERNAL MEDICINE
Payer: COMMERCIAL

## 2022-08-09 DIAGNOSIS — C25.1 MALIGNANT NEOPLASM OF BODY OF PANCREAS (H): ICD-10-CM

## 2022-08-09 LAB
ALBUMIN SERPL-MCNC: 3.6 G/DL (ref 3.4–5)
ALP SERPL-CCNC: 92 U/L (ref 40–150)
ALT SERPL W P-5'-P-CCNC: 34 U/L (ref 0–50)
ANION GAP SERPL CALCULATED.3IONS-SCNC: 4 MMOL/L (ref 3–14)
AST SERPL W P-5'-P-CCNC: 21 U/L (ref 0–45)
BASOPHILS # BLD AUTO: 0 10E3/UL (ref 0–0.2)
BASOPHILS NFR BLD AUTO: 1 %
BILIRUB SERPL-MCNC: 0.2 MG/DL (ref 0.2–1.3)
BUN SERPL-MCNC: 13 MG/DL (ref 7–30)
CALCIUM SERPL-MCNC: 8.7 MG/DL (ref 8.5–10.1)
CHLORIDE BLD-SCNC: 108 MMOL/L (ref 94–109)
CO2 SERPL-SCNC: 28 MMOL/L (ref 20–32)
CREAT SERPL-MCNC: 0.78 MG/DL (ref 0.52–1.04)
EOSINOPHIL # BLD AUTO: 0 10E3/UL (ref 0–0.7)
EOSINOPHIL NFR BLD AUTO: 1 %
ERYTHROCYTE [DISTWIDTH] IN BLOOD BY AUTOMATED COUNT: 12.7 % (ref 10–15)
GFR SERPL CREATININE-BSD FRML MDRD: 84 ML/MIN/1.73M2
GLUCOSE BLD-MCNC: 171 MG/DL (ref 70–99)
HCT VFR BLD AUTO: 37.1 % (ref 35–47)
HGB BLD-MCNC: 12.4 G/DL (ref 11.7–15.7)
IMM GRANULOCYTES # BLD: 0 10E3/UL
IMM GRANULOCYTES NFR BLD: 0 %
LYMPHOCYTES # BLD AUTO: 1.2 10E3/UL (ref 0.8–5.3)
LYMPHOCYTES NFR BLD AUTO: 26 %
MCH RBC QN AUTO: 29.4 PG (ref 26.5–33)
MCHC RBC AUTO-ENTMCNC: 33.4 G/DL (ref 31.5–36.5)
MCV RBC AUTO: 88 FL (ref 78–100)
MONOCYTES # BLD AUTO: 0.3 10E3/UL (ref 0–1.3)
MONOCYTES NFR BLD AUTO: 6 %
NEUTROPHILS # BLD AUTO: 3.1 10E3/UL (ref 1.6–8.3)
NEUTROPHILS NFR BLD AUTO: 66 %
NRBC # BLD AUTO: 0 10E3/UL
NRBC BLD AUTO-RTO: 0 /100
PLATELET # BLD AUTO: 216 10E3/UL (ref 150–450)
POTASSIUM BLD-SCNC: 3.8 MMOL/L (ref 3.4–5.3)
PROT SERPL-MCNC: 7.1 G/DL (ref 6.8–8.8)
RBC # BLD AUTO: 4.22 10E6/UL (ref 3.8–5.2)
SODIUM SERPL-SCNC: 140 MMOL/L (ref 133–144)
WBC # BLD AUTO: 4.6 10E3/UL (ref 4–11)

## 2022-08-09 PROCEDURE — 80053 COMPREHEN METABOLIC PANEL: CPT | Performed by: PATHOLOGY

## 2022-08-09 PROCEDURE — 85025 COMPLETE CBC W/AUTO DIFF WBC: CPT | Performed by: PATHOLOGY

## 2022-08-09 PROCEDURE — 74177 CT ABD & PELVIS W/CONTRAST: CPT | Performed by: RADIOLOGY

## 2022-08-09 PROCEDURE — 36415 COLL VENOUS BLD VENIPUNCTURE: CPT | Performed by: PATHOLOGY

## 2022-08-09 PROCEDURE — 86316 IMMUNOASSAY TUMOR OTHER: CPT | Mod: 90 | Performed by: PATHOLOGY

## 2022-08-09 PROCEDURE — 99000 SPECIMEN HANDLING OFFICE-LAB: CPT | Performed by: PATHOLOGY

## 2022-08-09 PROCEDURE — 71260 CT THORAX DX C+: CPT | Performed by: RADIOLOGY

## 2022-08-09 RX ORDER — IOPAMIDOL 755 MG/ML
70 INJECTION, SOLUTION INTRAVASCULAR ONCE
Status: COMPLETED | OUTPATIENT
Start: 2022-08-09 | End: 2022-08-09

## 2022-08-09 RX ADMIN — IOPAMIDOL 70 ML: 755 INJECTION, SOLUTION INTRAVASCULAR at 16:09

## 2022-08-11 LAB — CGA SERPL-MCNC: 63 NG/ML

## 2022-08-19 DIAGNOSIS — J47.9 BRONCHIECTASIS WITHOUT COMPLICATION (H): Primary | ICD-10-CM

## 2022-08-23 ENCOUNTER — TELEPHONE (OUTPATIENT)
Dept: PULMONOLOGY | Facility: CLINIC | Age: 62
End: 2022-08-23

## 2022-08-23 NOTE — TELEPHONE ENCOUNTER
Left message for pt to return call to clinic to review prep for upcoming bronchoscopy.    Please arrive 1 (one) hour prior to your exam appointment.  Endoscopy suite of [facility] at [address] [place], [place], [place]    What is a bronchoscopy?  This exam uses a lighted tube to look at the main airways of the lungs. We insert a thin, flexible tube through your nose or mouth, past your windpipe and into your lungs. We will take a fluid or tissue sample if needed.     How do I get ready?  Please bring an adult who can drive you home after your exam. You will receive medications to sedate you (help you relax). You will not be able to drive for 12 hours.     Follow these guidelines for taking your medications:   - If you take blood thinners (examples: aspirin, warfarin, dabigatran, rivaroxaban, clopidogrel, apixaban, ticagrelor, prasugrel, edoxaban, fondaparinux, enoxaparin, Coumadin, Pradaxa, Xarelto, Plavix, Eliquis, Sayvasa, Brilinta, Effient, Arixtra, Lovenox): You may need to stop them a few days before your exam. Your nurse or doctor will explain the best way to do this.    - The day before your test (or as directed by your doctor): Stop taking Advil (ibuprofen), Aleve (naprosyn) or other over-the-counter pain killers. Tylenol (acetaminophen) is okay.     - If you have diabetes: on the morning of treatment, do not take insulin or other diabetes medicine (unless your doctor tells you to).    - Bring a list of your medicines and their doses to the exam.    Stop all food and drink, including water 6 hours before the exam.

## 2022-09-06 ENCOUNTER — LAB (OUTPATIENT)
Dept: LAB | Facility: CLINIC | Age: 62
End: 2022-09-06
Payer: COMMERCIAL

## 2022-09-06 DIAGNOSIS — J47.9 BRONCHIECTASIS WITHOUT COMPLICATION (H): ICD-10-CM

## 2022-09-06 LAB
BASOPHILS # BLD AUTO: 0 10E3/UL (ref 0–0.2)
BASOPHILS NFR BLD AUTO: 1 %
EOSINOPHIL # BLD AUTO: 0 10E3/UL (ref 0–0.7)
EOSINOPHIL NFR BLD AUTO: 1 %
ERYTHROCYTE [DISTWIDTH] IN BLOOD BY AUTOMATED COUNT: 13 % (ref 10–15)
HCT VFR BLD AUTO: 38.7 % (ref 35–47)
HGB BLD-MCNC: 12.8 G/DL (ref 11.7–15.7)
INR PPP: 0.83 (ref 0.85–1.15)
LYMPHOCYTES # BLD AUTO: 1 10E3/UL (ref 0.8–5.3)
LYMPHOCYTES NFR BLD AUTO: 20 %
MCH RBC QN AUTO: 29.6 PG (ref 26.5–33)
MCHC RBC AUTO-ENTMCNC: 33.1 G/DL (ref 31.5–36.5)
MCV RBC AUTO: 89 FL (ref 78–100)
MONOCYTES # BLD AUTO: 0.4 10E3/UL (ref 0–1.3)
MONOCYTES NFR BLD AUTO: 8 %
NEUTROPHILS # BLD AUTO: 3.4 10E3/UL (ref 1.6–8.3)
NEUTROPHILS NFR BLD AUTO: 70 %
PLATELET # BLD AUTO: 212 10E3/UL (ref 150–450)
RBC # BLD AUTO: 4.33 10E6/UL (ref 3.8–5.2)
WBC # BLD AUTO: 4.9 10E3/UL (ref 4–11)

## 2022-09-06 PROCEDURE — 85610 PROTHROMBIN TIME: CPT

## 2022-09-06 PROCEDURE — 85025 COMPLETE CBC W/AUTO DIFF WBC: CPT

## 2022-09-06 PROCEDURE — 36415 COLL VENOUS BLD VENIPUNCTURE: CPT

## 2022-09-08 ENCOUNTER — HOSPITAL ENCOUNTER (OUTPATIENT)
Facility: CLINIC | Age: 62
Discharge: HOME | End: 2022-09-08
Payer: COMMERCIAL

## 2022-09-08 VITALS
HEART RATE: 78 BPM | DIASTOLIC BLOOD PRESSURE: 91 MMHG | SYSTOLIC BLOOD PRESSURE: 149 MMHG | OXYGEN SATURATION: 92 % | TEMPERATURE: 98.3 F | RESPIRATION RATE: 14 BRPM

## 2022-09-08 LAB
APPEARANCE FLD: ABNORMAL
BRONCHOSCOPY: NORMAL
CELL COUNT BODY FLUID SOURCE: ABNORMAL
COLOR FLD: ABNORMAL
EOSINOPHIL NFR FLD MANUAL: 9 %
GRAM STAIN RESULT: NORMAL
GRAM STAIN RESULT: NORMAL
LYMPHOCYTES NFR FLD MANUAL: 2 %
MONOS+MACROS NFR FLD MANUAL: 10 %
NEUTS BAND NFR FLD MANUAL: 80 %
WBC # FLD AUTO: 725 /UL

## 2022-09-08 PROCEDURE — 87206 SMEAR FLUORESCENT/ACID STAI: CPT

## 2022-09-08 PROCEDURE — 88312 SPECIAL STAINS GROUP 1: CPT | Mod: TC

## 2022-09-08 PROCEDURE — 31624 DX BRONCHOSCOPE/LAVAGE: CPT

## 2022-09-08 PROCEDURE — 87102 FUNGUS ISOLATION CULTURE: CPT

## 2022-09-08 PROCEDURE — 87070 CULTURE OTHR SPECIMN AEROBIC: CPT

## 2022-09-08 PROCEDURE — 89051 BODY FLUID CELL COUNT: CPT

## 2022-09-08 PROCEDURE — 88312 SPECIAL STAINS GROUP 1: CPT | Mod: 26 | Performed by: PATHOLOGY

## 2022-09-08 PROCEDURE — 250N000011 HC RX IP 250 OP 636

## 2022-09-08 PROCEDURE — 88108 CYTOPATH CONCENTRATE TECH: CPT | Mod: 26 | Performed by: PATHOLOGY

## 2022-09-08 PROCEDURE — 87116 MYCOBACTERIA CULTURE: CPT

## 2022-09-08 PROCEDURE — G0500 MOD SEDAT ENDO SERVICE >5YRS: HCPCS

## 2022-09-08 PROCEDURE — 250N000009 HC RX 250

## 2022-09-08 PROCEDURE — 87205 SMEAR GRAM STAIN: CPT

## 2022-09-08 RX ORDER — MAGNESIUM HYDROXIDE 1200 MG/15ML
LIQUID ORAL PRN
Status: COMPLETED | OUTPATIENT
Start: 2022-09-08 | End: 2022-09-08

## 2022-09-08 RX ORDER — FENTANYL CITRATE 50 UG/ML
INJECTION, SOLUTION INTRAMUSCULAR; INTRAVENOUS PRN
Status: COMPLETED | OUTPATIENT
Start: 2022-09-08 | End: 2022-09-08

## 2022-09-08 RX ORDER — LIDOCAINE HYDROCHLORIDE 20 MG/ML
INJECTION, SOLUTION INFILTRATION; PERINEURAL PRN
Status: COMPLETED | OUTPATIENT
Start: 2022-09-08 | End: 2022-09-08

## 2022-09-08 RX ORDER — ONDANSETRON 2 MG/ML
4 INJECTION INTRAMUSCULAR; INTRAVENOUS EVERY 6 HOURS PRN
Status: DISCONTINUED | OUTPATIENT
Start: 2022-09-08 | End: 2022-09-08 | Stop reason: HOSPADM

## 2022-09-08 RX ORDER — LIDOCAINE HYDROCHLORIDE 40 MG/ML
INJECTION, SOLUTION RETROBULBAR PRN
Status: COMPLETED | OUTPATIENT
Start: 2022-09-08 | End: 2022-09-08

## 2022-09-08 RX ORDER — LIDOCAINE 40 MG/G
CREAM TOPICAL
Status: DISCONTINUED | OUTPATIENT
Start: 2022-09-08 | End: 2022-09-08 | Stop reason: HOSPADM

## 2022-09-08 RX ADMIN — LIDOCAINE HYDROCHLORIDE 10 ML: 20 INJECTION, SOLUTION INFILTRATION; PERINEURAL at 10:23

## 2022-09-08 RX ADMIN — SODIUM CHLORIDE 120 ML: 900 IRRIGANT IRRIGATION at 10:33

## 2022-09-08 RX ADMIN — FENTANYL CITRATE 50 MCG: 50 INJECTION, SOLUTION INTRAMUSCULAR; INTRAVENOUS at 10:23

## 2022-09-08 RX ADMIN — LIDOCAINE HYDROCHLORIDE 9 ML: 40 INJECTION, SOLUTION RETROBULBAR; TOPICAL at 10:32

## 2022-09-08 RX ADMIN — MIDAZOLAM 1 MG: 1 INJECTION INTRAMUSCULAR; INTRAVENOUS at 10:31

## 2022-09-08 RX ADMIN — MIDAZOLAM 1 MG: 1 INJECTION INTRAMUSCULAR; INTRAVENOUS at 10:23

## 2022-09-08 ASSESSMENT — ACTIVITIES OF DAILY LIVING (ADL)
ADLS_ACUITY_SCORE: 35
ADLS_ACUITY_SCORE: 35

## 2022-09-08 NOTE — DISCHARGE INSTRUCTIONS
[facility], [place]  Same-Day BRONCHOSCOPY Procedure (with or without biopsy and/or intervention)  Adult Discharge Orders & Instructions     You had a procedure known as an Bronchoscopy (Bronch). Your healthcare provider performed the Bronch to look directly into the airways of your trachea and/or lungs. This is done using a lighted camera called a bronchoscope. The bronchoscope allows your provider to see inside the tissue of the airways. Often biopsies, small samples of tissue, are taken to help diagnose and/or classify stages of disease growth. This procedure is used to diagnose diseases of the lungs and/or surrounding tissues.     After your procedure   Make sure to clarify with your healthcare provider any diet restrictions (For example, clear liquid, low fat, no caffeine, etc.)   Do NOT take aspirin containing medications or any other blood-thinning medicines (anticoagulants) until your healthcare provider says it's OK.   You MAY be prescribed antibiotics, depending on what was done and/or found during your EUS, make sure to take antibiotics as prescribed by your healthcare provider    For 24 hours after BRONCHOSCOPY     You may cough up a small amount of blood for a day or two  Get plenty of rest.  A responsible adult must stay with you for at least 24 hours after you leave the hospital.   Do not drive or use heavy equipment.  If you have weakness or tingling, don't drive or use heavy equipment until this feeling goes away.  Do not drink alcohol.  Avoid strenuous or risky activities (gym, yoga, cycling, etc.).  Ask for help when climbing stairs.   You may feel lightheaded.  IF so, sit for a few minutes before standing.  Have someone help you get up.   If you have nausea (feel sick to your stomach): Drink only clear liquids such as apple juice, ginger ale, broth or 7-Up.  Rest may also help.  Be sure to drink enough fluids.  Move to a regular diet as you feel able.  You may have a  slight fever. Call the doctor if your fever is over 100 F (37.7 C) (taken under the tongue) or lasts longer than 24 hours.  You may have a dry mouth, a sore throat, muscle aches or trouble sleeping.  These are normal and should go away after 24 hours.  Do not make important or legal decisions.     Call your doctor  for any of the following:    If you begin to cough up bright red blood, or large clots of blood   If you become increasing more short of breath or begin to have chest pains  Difficulty swallowing or feeling as though food or liquids are getting stuck   Sore throat lasting more than 2 days or pain that has worsened over time  Nausea and/or vomiting that is not resolving or has not responded to anti-nausea medications if prescribed to you   If you experience a fever above 100.5 F (38 C) for more than 24 hours.   It has been over 8 to 10 hours since surgery and you are still not able to urinate (pass water).      To contact a doctor, call:  [ ] Dr. Coy office at  [phone] (Monday thru Friday 8:00am to 4:30pm)  [ ] [phone] and ask for the PULMONARY MEDICINE resident on call (answered 24 hours a day)  [ ] Emergency Department: [facility]: [phone]    Take it easy when you get home.  Remember, same day surgery DOES NOT MEAN SAME DAY RECOVERY!  Healing is a gradual process.  You will need some time to recover - you may be more tired than you realize at first.  Rest and relax for at least the first 24 hours at home.  You'll feel better and heal faster if you take good care of yourself.

## 2022-09-08 NOTE — OR NURSING
Pt tolerated bronchoscopy with BAL of RML, veru well with conscious sedation. Pt desat to mid 80's at the end of the procedure. Oxiplus mask placed and O2 increades to 8 lpm. Pt currently back on NC at 2 lpm and SaO2 is in the mid 90s' Will send to recovery on 2 lpm

## 2022-09-09 LAB
PATH REPORT.COMMENTS IMP SPEC: NORMAL
PATH REPORT.FINAL DX SPEC: NORMAL
PATH REPORT.GROSS SPEC: NORMAL
PATH REPORT.MICROSCOPIC SPEC OTHER STN: NORMAL
PATH REPORT.RELEVANT HX SPEC: NORMAL

## 2022-09-10 LAB — BACTERIA BRONCH: NO GROWTH

## 2022-10-03 ENCOUNTER — OFFICE VISIT (OUTPATIENT)
Dept: FAMILY MEDICINE | Facility: CLINIC | Age: 62
End: 2022-10-03
Payer: COMMERCIAL

## 2022-10-03 VITALS
HEART RATE: 65 BPM | TEMPERATURE: 97.7 F | DIASTOLIC BLOOD PRESSURE: 88 MMHG | BODY MASS INDEX: 21.41 KG/M2 | SYSTOLIC BLOOD PRESSURE: 180 MMHG | RESPIRATION RATE: 16 BRPM | OXYGEN SATURATION: 100 % | WEIGHT: 133.2 LBS | HEIGHT: 66 IN

## 2022-10-03 DIAGNOSIS — B35.1 ONYCHOMYCOSIS: ICD-10-CM

## 2022-10-03 DIAGNOSIS — I10 HYPERTENSION GOAL BP (BLOOD PRESSURE) < 140/90: ICD-10-CM

## 2022-10-03 DIAGNOSIS — B35.3 TINEA PEDIS OF BOTH FEET: Primary | ICD-10-CM

## 2022-10-03 PROCEDURE — 99214 OFFICE O/P EST MOD 30 MIN: CPT | Performed by: FAMILY MEDICINE

## 2022-10-03 RX ORDER — KETOCONAZOLE 20 MG/G
CREAM TOPICAL DAILY
Qty: 60 G | Refills: 1 | Status: SHIPPED | OUTPATIENT
Start: 2022-10-03 | End: 2024-01-08

## 2022-10-03 ASSESSMENT — PAIN SCALES - GENERAL: PAINLEVEL: SEVERE PAIN (6)

## 2022-10-03 NOTE — PROGRESS NOTES
"  Assessment & Plan     Tinea pedis of both feet  - ketoconazole (NIZORAL) 2 % external cream; Apply topically daily To both feet for 4-6 weeks    Onychomycosis  - Offered oral treatment, but pt declines  - Continue topical remedies     Hypertension goal BP (blood pressure) < 140/90  - Patient notes home BPs are usually normal.  Advised her to check more frequently over the next 1-2 weeks and send us results via [email or web address].  May need to increase losartan dose.      Return in about 2 weeks (around 10/17/2022) for BP Recheck (virtual ok).    Alicia Jacobs DO  [facility]    ===========================================================    Subjective   Sheila is a 62 year old, presenting for the following health issues:  Foot Problems      History of Present Illness       Reason for visit:  Itching, burning, pain arches of feet, worse at night  Symptom onset:  1-2 weeks ago  Symptom intensity:  Moderate  Symptom progression:  Staying the same  Had these symptoms before:  No  What makes it worse:  No  What makes it better:  Ointment    She eats 2-3 servings of fruits and vegetables daily.She consumes 0 sweetened beverage(s) daily.She exercises with enough effort to increase her heart rate 9 or less minutes per day.  She exercises with enough effort to increase her heart rate 3 or less days per week. She is missing 2 dose(s) of medications per week.       Patient mentioned she has a toenail fungal infection but cannot see anything on her arches. She has been using a vaseline based ointment on her feet. It was helping but now it is not.        Review of Systems   Constitutional, HEENT, cardiovascular, pulmonary, gi and gu systems are negative, except as otherwise noted.      Objective    BP (!) 180/88 (BP Location: Right arm, Patient Position: Chair, Cuff Size: Adult Regular)   Pulse 65   Temp 97.7  F (36.5  C) (Oral)   Resp 16   Ht 1.664 m (5' 5.5\")   Wt 60.4 kg (133 lb 3.2 oz)   LMP " 03/24/2006   SpO2 100%   BMI 21.83 kg/m    Body mass index is 21.83 kg/m .  Physical Exam   GENERAL: healthy, alert and no distress  RESP: lungs clear to auscultation - no rales, rhonchi or wheezes  CV: regular rates and rhythm, normal S1 S2, no S3 or S4, no murmur, click or rub and no peripheral edema  SKIN: Faint erythematous papular rash along right foot arch.  No rash on the left.  Onychomycosis of all toenails bilaterally

## 2022-10-06 LAB
BACTERIA BRONCH: NO GROWTH
BACTERIA BRONCH: NO GROWTH

## 2022-10-09 ENCOUNTER — HEALTH MAINTENANCE LETTER (OUTPATIENT)
Age: 62
End: 2022-10-09

## 2022-10-11 ENCOUNTER — MYC MEDICAL ADVICE (OUTPATIENT)
Dept: PULMONOLOGY | Facility: CLINIC | Age: 62
End: 2022-10-11

## 2022-10-11 DIAGNOSIS — J47.9 BRONCHIECTASIS WITHOUT COMPLICATION (H): Primary | ICD-10-CM

## 2022-10-17 RX ORDER — AZITHROMYCIN 250 MG/1
250 TABLET, FILM COATED ORAL DAILY
Qty: 30 TABLET | Refills: 5 | Status: SHIPPED | OUTPATIENT
Start: 2022-10-17 | End: 2023-08-03

## 2022-10-17 NOTE — TELEPHONE ENCOUNTER
Orders placed for EKG - 12 lead and azithromycin 250 mg/day per Dr Hamm.  Reviewed with Sheila.  She will complete EKG (scheduling number provided) and await Dr Hamm's notification to start azithromycin.  Advised that AFB culture is still processing and negative to date.  Sheila verbalized understanding and agreement with plan.

## 2022-10-18 DIAGNOSIS — J47.9 BRONCHIECTASIS WITHOUT COMPLICATION (H): ICD-10-CM

## 2022-10-18 NOTE — Clinical Note
Sheila Leos would like your interpretation of her EKG read.  OK to send via [employer]. Thanks, Isis

## 2022-10-19 LAB
ATRIAL RATE - MUSE: 57 BPM
DIASTOLIC BLOOD PRESSURE - MUSE: NORMAL MMHG
INTERPRETATION ECG - MUSE: NORMAL
P AXIS - MUSE: 69 DEGREES
PR INTERVAL - MUSE: 166 MS
QRS DURATION - MUSE: 86 MS
QT - MUSE: 458 MS
QTC - MUSE: 445 MS
R AXIS - MUSE: 15 DEGREES
SYSTOLIC BLOOD PRESSURE - MUSE: NORMAL MMHG
T AXIS - MUSE: 68 DEGREES
VENTRICULAR RATE- MUSE: 57 BPM

## 2022-10-20 NOTE — PROGRESS NOTES
Relayed message from Dr Hamm to start daily azithromycin and schedule follow up EKG 2 - 3 weeks after starting med.  Pt verbalized understanding.  She has requested Dr. Hamm's interpretation of her recent EKG.  Provider notified.

## 2022-10-27 ENCOUNTER — TELEPHONE (OUTPATIENT)
Dept: FAMILY MEDICINE | Facility: CLINIC | Age: 62
End: 2022-10-27

## 2022-10-27 NOTE — TELEPHONE ENCOUNTER
Reason for Call:  Appointment Request    Patient requesting this type of appt:  blood pressure concerns and diabetes check     Requested provider: Jhon Abbott    Reason patient unable to be scheduled: Not within requested timeframe    When does patient want to be seen/preferred time: Same day    Comments: blood pressure concerns and diabetes check     Could we send this information to you in [employer] or would you prefer to receive a phone call?:   Patient would prefer a phone call   Okay to leave a detailed message?: Yes at Work number on file:  [phone] (work)    Call taken on 10/27/2022 at 7:27 AM by Esha Swanson

## 2022-10-31 ENCOUNTER — OFFICE VISIT (OUTPATIENT)
Dept: FAMILY MEDICINE | Facility: CLINIC | Age: 62
End: 2022-10-31
Payer: COMMERCIAL

## 2022-10-31 VITALS
WEIGHT: 134 LBS | TEMPERATURE: 98 F | OXYGEN SATURATION: 99 % | RESPIRATION RATE: 16 BRPM | HEART RATE: 82 BPM | HEIGHT: 66 IN | DIASTOLIC BLOOD PRESSURE: 86 MMHG | BODY MASS INDEX: 21.53 KG/M2 | SYSTOLIC BLOOD PRESSURE: 156 MMHG

## 2022-10-31 DIAGNOSIS — E78.00 HIGH BLOOD CHOLESTEROL: ICD-10-CM

## 2022-10-31 DIAGNOSIS — R73.03 PREDIABETES: ICD-10-CM

## 2022-10-31 DIAGNOSIS — I10 HYPERTENSION GOAL BP (BLOOD PRESSURE) < 140/90: Primary | ICD-10-CM

## 2022-10-31 DIAGNOSIS — R79.89 ABNORMAL CBC: ICD-10-CM

## 2022-10-31 LAB
BASOPHILS # BLD AUTO: 0 10E3/UL (ref 0–0.2)
BASOPHILS NFR BLD AUTO: 1 %
CHOLEST SERPL-MCNC: 198 MG/DL
EOSINOPHIL # BLD AUTO: 0.1 10E3/UL (ref 0–0.7)
EOSINOPHIL NFR BLD AUTO: 2 %
ERYTHROCYTE [DISTWIDTH] IN BLOOD BY AUTOMATED COUNT: 13 % (ref 10–15)
FASTING STATUS PATIENT QL REPORTED: YES
HBA1C MFR BLD: 6 % (ref 0–5.6)
HCT VFR BLD AUTO: 37.5 % (ref 35–47)
HDLC SERPL-MCNC: 92 MG/DL
HGB BLD-MCNC: 12.6 G/DL (ref 11.7–15.7)
LDLC SERPL CALC-MCNC: 82 MG/DL
LYMPHOCYTES # BLD AUTO: 1.1 10E3/UL (ref 0.8–5.3)
LYMPHOCYTES NFR BLD AUTO: 36 %
MCH RBC QN AUTO: 29.9 PG (ref 26.5–33)
MCHC RBC AUTO-ENTMCNC: 33.6 G/DL (ref 31.5–36.5)
MCV RBC AUTO: 89 FL (ref 78–100)
MONOCYTES # BLD AUTO: 0.3 10E3/UL (ref 0–1.3)
MONOCYTES NFR BLD AUTO: 9 %
NEUTROPHILS # BLD AUTO: 1.5 10E3/UL (ref 1.6–8.3)
NEUTROPHILS NFR BLD AUTO: 52 %
NONHDLC SERPL-MCNC: 106 MG/DL
PLATELET # BLD AUTO: 216 10E3/UL (ref 150–450)
RBC # BLD AUTO: 4.22 10E6/UL (ref 3.8–5.2)
TRIGL SERPL-MCNC: 118 MG/DL
WBC # BLD AUTO: 3 10E3/UL (ref 4–11)

## 2022-10-31 PROCEDURE — 83036 HEMOGLOBIN GLYCOSYLATED A1C: CPT | Performed by: FAMILY MEDICINE

## 2022-10-31 PROCEDURE — 36415 COLL VENOUS BLD VENIPUNCTURE: CPT | Performed by: FAMILY MEDICINE

## 2022-10-31 PROCEDURE — 99214 OFFICE O/P EST MOD 30 MIN: CPT | Performed by: FAMILY MEDICINE

## 2022-10-31 PROCEDURE — 85025 COMPLETE CBC W/AUTO DIFF WBC: CPT | Performed by: FAMILY MEDICINE

## 2022-10-31 PROCEDURE — 80061 LIPID PANEL: CPT | Performed by: FAMILY MEDICINE

## 2022-10-31 RX ORDER — LOSARTAN POTASSIUM 50 MG/1
50 TABLET ORAL DAILY
Qty: 90 TABLET | Refills: 0 | Status: SHIPPED | OUTPATIENT
Start: 2022-10-31 | End: 2023-08-21

## 2022-10-31 ASSESSMENT — PAIN SCALES - GENERAL: PAINLEVEL: NO PAIN (0)

## 2022-10-31 NOTE — PATIENT INSTRUCTIONS
Since you are doing well on current medication losartan, please take 1 and half pill for a week or so and monitor bp, if coming down to the range of 120's-140's then stay there.  If not then go to 2 pills of losartan 50mg.    If not coming down then as discussed we can start 5mg of norvasc to recheck at the next visit    Follow up 4 weeks  Jhon Abbott D.O.        Patient Education

## 2022-10-31 NOTE — LETTER
November 3, 2022      Kiersten Massey  [address] [place] [id number]        Dear Kiersten,     Your recent lab results were within normal limits. A copy of those results are included with this letter.        Sincerely,        Jhon Abbott, DO    Results for orders placed or performed in visit on 10/31/22   Hemoglobin A1c     Status: Abnormal   Result Value Ref Range    Hemoglobin A1C 6.0 (H) 0.0 - 5.6 %   Lipid panel reflex to direct LDL Fasting     Status: None   Result Value Ref Range    Cholesterol 198 <200 mg/dL    Triglycerides 118 <150 mg/dL    Direct Measure HDL 92 >=50 mg/dL    LDL Cholesterol Calculated 82 <=100 mg/dL    Non HDL Cholesterol 106 <130 mg/dL    Patient Fasting > 8hrs? Yes     Narrative    Cholesterol  Desirable:  <200 mg/dL    Triglycerides  Normal:  Less than 150 mg/dL  Borderline High:  150-199 mg/dL  High:  200-499 mg/dL  Very High:  Greater than or equal to 500 mg/dL    Direct Measure HDL  Female:  Greater than or equal to 50 mg/dL   Male:  Greater than or equal to 40 mg/dL    LDL Cholesterol  Desirable:  <100mg/dL  Above Desirable:  100-129 mg/dL   Borderline High:  130-159 mg/dL   High:  160-189 mg/dL   Very High:  >= 190 mg/dL    Non HDL Cholesterol  Desirable:  130 mg/dL  Above Desirable:  130-159 mg/dL  Borderline High:  160-189 mg/dL  High:  190-219 mg/dL  Very High:  Greater than or equal to 220 mg/dL   CBC with platelets and differential     Status: Abnormal   Result Value Ref Range    WBC Count 3.0 (L) 4.0 - 11.0 10e3/uL    RBC Count 4.22 3.80 - 5.20 10e6/uL    Hemoglobin 12.6 11.7 - 15.7 g/dL    Hematocrit 37.5 35.0 - 47.0 %    MCV 89 78 - 100 fL    MCH 29.9 26.5 - 33.0 pg    MCHC 33.6 31.5 - 36.5 g/dL    RDW 13.0 10.0 - 15.0 %    Platelet Count 216 150 - 450 10e3/uL    % Neutrophils 52 %    % Lymphocytes 36 %    % Monocytes 9 %    % Eosinophils 2 %    % Basophils 1 %    Absolute Neutrophils 1.5 (L) 1.6 - 8.3 10e3/uL    Absolute Lymphocytes 1.1 0.8 - 5.3  10e3/uL    Absolute Monocytes 0.3 0.0 - 1.3 10e3/uL    Absolute Eosinophils 0.1 0.0 - 0.7 10e3/uL    Absolute Basophils 0.0 0.0 - 0.2 10e3/uL   CBC with platelets and differential     Status: Abnormal    Narrative    The following orders were created for panel order CBC with platelets and differential.  Procedure                               Abnormality         Status                     ---------                               -----------         ------                     CBC with platelets and d...[[id number]]  Abnormal            Final result                 Please view results for these tests on the individual orders.

## 2022-10-31 NOTE — PROGRESS NOTES
ICD-10-CM    1. Hypertension goal BP (blood pressure) < 140/90  I10 losartan (COZAAR) 50 MG tablet     Lipid panel reflex to direct LDL Fasting     Lipid panel reflex to direct LDL Fasting      2. Prediabetes  R73.03 Hemoglobin A1c     Hemoglobin A1c     CANCELED: Hemoglobin A1c      3. High blood cholesterol  E78.00 Lipid panel reflex to direct LDL Fasting     Lipid panel reflex to direct LDL Fasting      4. Abnormal CBC  R79.89 CBC with platelets and differential        hypertension-elevated at home, no sx, she is already on losartan, declined a diuretic. History of lung diseaes so avoiding BB.  Advised increasing her losartan to 75mg if not improving and doing well then go up to 100mg    If not improving, she will call to add low dose norvasc, risks and benefits reviewed  Advised follow up with us in 4 weeks  She is fasting today so wants fasting labs  Will repeat bmp next visit as well           Subjective   Sheila is a 62 year old  presenting for the following health issues:  Chronic Disease Management    DECLINES COVID and FLU vaccine     History of Present Illness       Reason for visit:  High bp and blood glucose check  Symptom onset:  3-4 weeks ago  Symptom intensity:  Moderate  Symptom progression:  Staying the same    She eats 2-3 servings of fruits and vegetables daily.She consumes 0 sweetened beverage(s) daily.She exercises with enough effort to increase her heart rate 9 or less minutes per day.  She exercises with enough effort to increase her heart rate 3 or less days per week. She is missing 1 dose(s) of medications per week.  She is not taking prescribed medications regularly due to remembering to take.       Hypertension Follow-up      Do you check your blood pressure regularly outside of the clinic? Yes     Are you following a low salt diet? Yes    Are your blood pressures ever more than 140 on the top number (systolic) OR more   than 90 on the bottom number (diastolic), for example 140/90? Yes  at highest it was 173/96  She was not feeling any symptoms  Usually she does not check her Bp but started checking after her foot issue when her BP was elevated in office.         Review of Systems   Constitutional, HEENT, cardiovascular, pulmonary, GI, , musculoskeletal, neuro, skin, endocrine and psych systems are negative, except as otherwise noted.      Objective    LMP 03/24/2006   There is no height or weight on file to calculate BMI.  Physical Exam   GENERAL: healthy, alert and no distress  NECK: no adenopathy, no asymmetry, masses, or scars and thyroid normal to palpation  RESP: lungs clear to auscultation - no rales, rhonchi or wheezes  CV: regular rate and rhythm, normal S1 S2, no S3 or S4, no murmur, click or rub, no peripheral edema and peripheral pulses strong  ABDOMEN: soft, nontender, no hepatosplenomegaly, no masses and bowel sounds normal  MS: no gross musculoskeletal defects noted, no edema

## 2022-11-03 LAB
ACID FAST STAIN (ARUP): NORMAL
ACID FAST STAIN (ARUP): NORMAL

## 2022-11-08 DIAGNOSIS — J47.9 BRONCHIECTASIS WITHOUT COMPLICATION (H): ICD-10-CM

## 2022-11-09 LAB
ATRIAL RATE - MUSE: 66 BPM
DIASTOLIC BLOOD PRESSURE - MUSE: NORMAL MMHG
INTERPRETATION ECG - MUSE: NORMAL
P AXIS - MUSE: 2 DEGREES
PR INTERVAL - MUSE: 144 MS
QRS DURATION - MUSE: 76 MS
QT - MUSE: 426 MS
QTC - MUSE: 446 MS
R AXIS - MUSE: 20 DEGREES
SYSTOLIC BLOOD PRESSURE - MUSE: NORMAL MMHG
T AXIS - MUSE: 60 DEGREES
VENTRICULAR RATE- MUSE: 66 BPM

## 2022-11-11 DIAGNOSIS — J47.9 BRONCHIECTASIS WITHOUT COMPLICATION (H): Primary | ICD-10-CM

## 2022-11-11 DIAGNOSIS — J21.9 BRONCHIOLITIS: ICD-10-CM

## 2022-11-15 ENCOUNTER — TELEPHONE (OUTPATIENT)
Dept: PULMONOLOGY | Facility: CLINIC | Age: 62
End: 2022-11-15

## 2022-11-15 NOTE — TELEPHONE ENCOUNTER
Left Voicemail (1st Attempt) for the patient to call back and schedule the following:    Appointment type: RTN  Provider: Noris  Return date: Mid-January 2023  Specialty phone number: [phone]  Additional appointment(s) needed: CHEST CT  Additonal Notes: follow-up with Dr. Hamm after chest CT in mid-January.

## 2022-11-15 NOTE — TELEPHONE ENCOUNTER
Patient Contacted for the patient to call back and schedule the following:    Appointment type: RTN  Provider: Noris  Return date: 2/6/23  Specialty phone number: [phone]  Additional appointment(s) needed: CHEST CT  Additonal Notes: Pt scheduled for CHEST CT 1/18/23 at [facility].

## 2022-12-05 ASSESSMENT — ASTHMA QUESTIONNAIRES
QUESTION_4 LAST FOUR WEEKS HOW OFTEN HAVE YOU USED YOUR RESCUE INHALER OR NEBULIZER MEDICATION (SUCH AS ALBUTEROL): TWO OR THREE TIMES PER WEEK
QUESTION_1 LAST FOUR WEEKS HOW MUCH OF THE TIME DID YOUR ASTHMA KEEP YOU FROM GETTING AS MUCH DONE AT WORK, SCHOOL OR AT HOME: NONE OF THE TIME
QUESTION_2 LAST FOUR WEEKS HOW OFTEN HAVE YOU HAD SHORTNESS OF BREATH: NOT AT ALL
QUESTION_3 LAST FOUR WEEKS HOW OFTEN DID YOUR ASTHMA SYMPTOMS (WHEEZING, COUGHING, SHORTNESS OF BREATH, CHEST TIGHTNESS OR PAIN) WAKE YOU UP AT NIGHT OR EARLIER THAN USUAL IN THE MORNING: NOT AT ALL
ACT_TOTALSCORE: 22
ACT_TOTALSCORE: 22
QUESTION_5 LAST FOUR WEEKS HOW WOULD YOU RATE YOUR ASTHMA CONTROL: WELL CONTROLLED

## 2022-12-06 RX ORDER — LOSARTAN POTASSIUM 50 MG/1
50 TABLET ORAL DAILY
Qty: 90 TABLET | Refills: 0 | Status: CANCELLED | OUTPATIENT
Start: 2022-12-06

## 2022-12-06 NOTE — PROGRESS NOTES
ICD-10-CM    1. Hypertension goal BP (blood pressure) < 140/90  I10 losartan (COZAAR) 50 MG tablet     Comprehensive metabolic panel (BMP + Alb, Alk Phos, ALT, AST, Total. Bili, TP)     Comprehensive metabolic panel (BMP + Alb, Alk Phos, ALT, AST, Total. Bili, TP)      2. Prediabetes  R73.03       3. Malignant neoplasm of body of pancreas (H)  C25.1       4. Moderate persistent asthma, unspecified whether complicated  J45.40       5. Atrophy of vagina  N95.2 estradiol (ESTRACE) 0.1 MG/GM vaginal cream        hypertension-stable,cont med, check labs, she denies any concerns, she has increased med to 75 mg and tolerating it well.  bp at home has been in 130's /80's-145-90    predm-stable,avoid carbs, recheck in 6 months    asthama -follows pulm  History of pancreatic cancer, sees oncology, they do CT lung and abdomen and pelvis every year  History of vaginal atrophy uses vaginal estrace, improving    Subjective   Sheila is a 62 year old  presenting for the following health issues:  Chronic Disease Management    75MG daily for Losartan helped lower BP per patient   Declines COVID and FLU today, will likely get once she turns 63    History of Present Illness       Diabetes:   She presents for follow up of diabetes.  She is not checking blood glucose. She has no concerns regarding her diabetes at this time.  She is not experiencing numbness or burning in feet, excessive thirst, blurry vision, weight changes or redness, sores or blisters on feet.         Hypertension: She presents for follow up of hypertension.  She does check blood pressure  regularly outside of the clinic. Outside blood pressures have been over 140/90. She follows a low salt diet.       Has history of pancreatic cancer, in remission, sees oncology , has CT in October          Review of Systems   Constitutional, HEENT, cardiovascular, pulmonary, GI, , musculoskeletal, neuro, skin, endocrine and psych systems are negative, except as otherwise noted.     "  Objective    BP (!) 144/84   Pulse 76   Temp 98.1  F (36.7  C) (Oral)   Resp 16   Ht 1.664 m (5' 5.5\")   Wt 61.2 kg (135 lb)   LMP 03/24/2006   SpO2 100%   BMI 22.12 kg/m    Body mass index is 22.12 kg/m .  Physical Exam   GENERAL: healthy, alert and no distress  NECK: no adenopathy, no asymmetry, masses, or scars and thyroid normal to palpation  RESP: lungs clear to auscultation - no rales, rhonchi or wheezes  CV: regular rate and rhythm, normal S1 S2, no S3 or S4, no murmur, click or rub, no peripheral edema and peripheral pulses strong  ABDOMEN: soft, nontender, no hepatosplenomegaly, no masses and bowel sounds normal  MS: no gross musculoskeletal defects noted, no edema                    "

## 2022-12-06 NOTE — PATIENT INSTRUCTIONS
Labs today  Follow  up with specialist as planned  Continue losartan  If bp goes higher please increase to 100mg and let me   Follow up in 6 months  Jhon Abbott D.O.        Patient Education

## 2022-12-08 ENCOUNTER — OFFICE VISIT (OUTPATIENT)
Dept: FAMILY MEDICINE | Facility: CLINIC | Age: 62
End: 2022-12-08
Payer: COMMERCIAL

## 2022-12-08 VITALS
HEART RATE: 76 BPM | TEMPERATURE: 98.1 F | SYSTOLIC BLOOD PRESSURE: 144 MMHG | BODY MASS INDEX: 21.69 KG/M2 | RESPIRATION RATE: 16 BRPM | DIASTOLIC BLOOD PRESSURE: 84 MMHG | HEIGHT: 66 IN | OXYGEN SATURATION: 100 % | WEIGHT: 135 LBS

## 2022-12-08 DIAGNOSIS — C25.1 MALIGNANT NEOPLASM OF BODY OF PANCREAS (H): ICD-10-CM

## 2022-12-08 DIAGNOSIS — I10 HYPERTENSION GOAL BP (BLOOD PRESSURE) < 140/90: Primary | ICD-10-CM

## 2022-12-08 DIAGNOSIS — R73.03 PREDIABETES: ICD-10-CM

## 2022-12-08 DIAGNOSIS — J45.40 MODERATE PERSISTENT ASTHMA, UNSPECIFIED WHETHER COMPLICATED: ICD-10-CM

## 2022-12-08 DIAGNOSIS — N95.2 ATROPHY OF VAGINA: ICD-10-CM

## 2022-12-08 PROCEDURE — 36415 COLL VENOUS BLD VENIPUNCTURE: CPT | Performed by: FAMILY MEDICINE

## 2022-12-08 PROCEDURE — 80053 COMPREHEN METABOLIC PANEL: CPT | Performed by: FAMILY MEDICINE

## 2022-12-08 PROCEDURE — 99214 OFFICE O/P EST MOD 30 MIN: CPT | Performed by: FAMILY MEDICINE

## 2022-12-08 RX ORDER — LOSARTAN POTASSIUM 50 MG/1
75 TABLET ORAL DAILY
Qty: 135 TABLET | Refills: 1 | Status: SHIPPED | OUTPATIENT
Start: 2022-12-08 | End: 2023-08-03

## 2022-12-08 RX ORDER — ESTRADIOL 0.1 MG/G
2 CREAM VAGINAL
Qty: 42.5 G | Refills: 3 | Status: SHIPPED | OUTPATIENT
Start: 2022-12-08

## 2022-12-08 ASSESSMENT — PAIN SCALES - GENERAL: PAINLEVEL: NO PAIN (0)

## 2022-12-09 LAB
ALBUMIN SERPL BCG-MCNC: 4 G/DL (ref 3.5–5.2)
ALP SERPL-CCNC: 92 U/L (ref 35–104)
ALT SERPL W P-5'-P-CCNC: 39 U/L (ref 10–35)
ANION GAP SERPL CALCULATED.3IONS-SCNC: 10 MMOL/L (ref 7–15)
AST SERPL W P-5'-P-CCNC: 30 U/L (ref 10–35)
BILIRUB SERPL-MCNC: 0.5 MG/DL
BUN SERPL-MCNC: 13.9 MG/DL (ref 8–23)
CALCIUM SERPL-MCNC: 9.2 MG/DL (ref 8.8–10.2)
CHLORIDE SERPL-SCNC: 103 MMOL/L (ref 98–107)
CREAT SERPL-MCNC: 0.8 MG/DL (ref 0.51–0.95)
DEPRECATED HCO3 PLAS-SCNC: 26 MMOL/L (ref 22–29)
GFR SERPL CREATININE-BSD FRML MDRD: 82 ML/MIN/1.73M2
GLUCOSE SERPL-MCNC: 137 MG/DL (ref 70–99)
POTASSIUM SERPL-SCNC: 4 MMOL/L (ref 3.4–5.3)
PROT SERPL-MCNC: 6.6 G/DL (ref 6.4–8.3)
SODIUM SERPL-SCNC: 139 MMOL/L (ref 136–145)

## 2023-01-18 ENCOUNTER — ANCILLARY PROCEDURE (OUTPATIENT)
Dept: CT IMAGING | Facility: CLINIC | Age: 63
End: 2023-01-18
Payer: COMMERCIAL

## 2023-01-18 DIAGNOSIS — J47.9 BRONCHIECTASIS WITHOUT COMPLICATION (H): ICD-10-CM

## 2023-01-18 DIAGNOSIS — J21.9 BRONCHIOLITIS: ICD-10-CM

## 2023-01-18 PROCEDURE — 71250 CT THORAX DX C-: CPT | Mod: TC | Performed by: RADIOLOGY

## 2023-01-24 DIAGNOSIS — J47.9 BRONCHIECTASIS WITHOUT COMPLICATION (H): Primary | ICD-10-CM

## 2023-01-24 NOTE — PROGRESS NOTES
Took azithromycin for about 3 months. Did feel slightly better (more energy, little less shortness of breath and chest congestion).  Now that azithromycin is done, she feels like some of the stuff is back in her lungs.      She was getting diarrhea from azithromycin, so she started taking a probiotic.     Her chest CT does look slightly better, but since she is having diarrhea I dont' want to continue azithromycin at this time.      Will try a LABA ( she tried qvar, breo, albuterol nebs in the past.  Albuterol inhalers do help her).  I prescribed serevent. She will get in touch with me if things are not going well.  We also talked about active coughing to loosen up mucus in her lungs and get it out.      Follow up in 1 year.     Alex Hamm MD

## 2023-05-02 PROBLEM — M25.512 LEFT SHOULDER PAIN: Status: RESOLVED | Noted: 2022-06-27 | Resolved: 2023-05-02

## 2023-05-02 PROBLEM — M54.40 BILATERAL LOW BACK PAIN WITH SCIATICA: Status: RESOLVED | Noted: 2022-07-03 | Resolved: 2023-05-02

## 2023-05-02 NOTE — PROGRESS NOTES
Subjective:  HPI  Physical Exam                    Objective:  System    Physical Exam    General     ROS    Assessment/Plan:    DISCHARGE REPORT    Progress reporting period is from 7/13/23.     SUBJECTIVE  Subjective: Shoulder and back feel a lot better. Exercises feel good.   Current Pain level: 3/10   Initial Pain level: 7/10   Changes in function: No changes noted in function since last SOAP note   Adverse reactions: None;   ,     Patient has failed to return to therapy so current objective findings are unknown.  The subjective and objective information are from the last SOAP note on this patient.    OBJECTIVE  Objective: Shoulder AROM WNL, pain with end range IR.     ASSESSMENT/PLAN  Diagnosis 1:  LBP  Pain -  hot/cold therapy, manual therapy, splint/taping/bracing/orthotics, self management, education, directional preference exercise and home program  Decreased ROM/flexibility - manual therapy, therapeutic exercise and home program  Decreased strength - therapeutic exercise, therapeutic activities and home program  STG/LTGs have been met or progress has been made towards goals:  Yes (See Goal flow sheet completed today.)  Assessment of Progress: The patient has not returned to therapy. Current status is unknown.  Self Management Plans:  Patient has been instructed in a home treatment program.  Patient  has been instructed in self management of symptoms.  Kiersten continues to require the following intervention to meet STG and LTG's: PT intervention is no longer required to meet STG/LTG.  The patient failed to complete scheduled/ordered appointments so current information is unknown.  We will discharge this patient from PT.    Recommendations:  This patient is ready to be discharged from therapy and continue their home treatment program.    Please refer to the daily flowsheet for treatment today, total treatment time and time spent performing 1:1 timed codes.

## 2023-05-04 ENCOUNTER — ANCILLARY PROCEDURE (OUTPATIENT)
Dept: MAMMOGRAPHY | Facility: CLINIC | Age: 63
End: 2023-05-04
Attending: FAMILY MEDICINE
Payer: COMMERCIAL

## 2023-05-04 DIAGNOSIS — Z12.31 VISIT FOR SCREENING MAMMOGRAM: ICD-10-CM

## 2023-05-04 PROCEDURE — 77063 BREAST TOMOSYNTHESIS BI: CPT | Mod: TC | Performed by: RADIOLOGY

## 2023-05-04 PROCEDURE — 77067 SCR MAMMO BI INCL CAD: CPT | Mod: TC | Performed by: RADIOLOGY

## 2023-05-08 NOTE — RESULT ENCOUNTER NOTE
Mammo results managed by the breast center. Results communicated to the patient by their office.   Jhon Abbott D.O.

## 2023-06-14 DIAGNOSIS — C25.1 MALIGNANT NEOPLASM OF BODY OF PANCREAS (H): Primary | ICD-10-CM

## 2023-06-26 ENCOUNTER — TELEPHONE (OUTPATIENT)
Dept: FAMILY MEDICINE | Facility: CLINIC | Age: 63
End: 2023-06-26
Payer: COMMERCIAL

## 2023-06-26 NOTE — TELEPHONE ENCOUNTER
Patient Quality Outreach    Patient is due for the following:   Asthma  -  ACT needed and Asthma follow-up visit  Physical Preventive Adult Physical    Next Steps:   No follow up needed at this time.  Patient has upcoming appointment, these items will be addressed at that time.    Type of outreach:    Chart review performed, no outreach needed.      Questions for provider review:    None           Maude Mendenhall CMA

## 2023-07-30 ASSESSMENT — ENCOUNTER SYMPTOMS
FREQUENCY: 1
EYE PAIN: 0
ABDOMINAL PAIN: 0
COUGH: 0
SHORTNESS OF BREATH: 0
FEVER: 0
BREAST MASS: 0
ARTHRALGIAS: 1
CHILLS: 0
DYSURIA: 0
NERVOUS/ANXIOUS: 0
DIARRHEA: 0
PARESTHESIAS: 0
WEAKNESS: 0
HEADACHES: 0
MYALGIAS: 0
NAUSEA: 0
SORE THROAT: 0
HEARTBURN: 0
JOINT SWELLING: 1
HEMATOCHEZIA: 0
HEMATURIA: 0
PALPITATIONS: 0
CONSTIPATION: 0
DIZZINESS: 0

## 2023-07-30 ASSESSMENT — ACTIVITIES OF DAILY LIVING (ADL): CURRENT_FUNCTION: NO ASSISTANCE NEEDED

## 2023-07-30 ASSESSMENT — ASTHMA QUESTIONNAIRES: ACT_TOTALSCORE: 22

## 2023-08-03 ENCOUNTER — OFFICE VISIT (OUTPATIENT)
Dept: FAMILY MEDICINE | Facility: CLINIC | Age: 63
End: 2023-08-03
Payer: COMMERCIAL

## 2023-08-03 VITALS
DIASTOLIC BLOOD PRESSURE: 74 MMHG | BODY MASS INDEX: 21.05 KG/M2 | TEMPERATURE: 98 F | HEART RATE: 70 BPM | SYSTOLIC BLOOD PRESSURE: 130 MMHG | RESPIRATION RATE: 16 BRPM | HEIGHT: 66 IN | OXYGEN SATURATION: 100 % | WEIGHT: 131 LBS

## 2023-08-03 DIAGNOSIS — R73.03 PREDIABETES: ICD-10-CM

## 2023-08-03 DIAGNOSIS — Z01.84 IMMUNITY STATUS TESTING: ICD-10-CM

## 2023-08-03 DIAGNOSIS — R35.0 URINARY FREQUENCY: ICD-10-CM

## 2023-08-03 DIAGNOSIS — C25.1 MALIGNANT NEOPLASM OF BODY OF PANCREAS (H): ICD-10-CM

## 2023-08-03 DIAGNOSIS — Z00.00 ROUTINE GENERAL MEDICAL EXAMINATION AT A HEALTH CARE FACILITY: Primary | ICD-10-CM

## 2023-08-03 DIAGNOSIS — I10 HYPERTENSION GOAL BP (BLOOD PRESSURE) < 140/90: ICD-10-CM

## 2023-08-03 DIAGNOSIS — N39.0 PYURIA DUE TO BACTERIAL URINARY TRACT INFECTION: ICD-10-CM

## 2023-08-03 DIAGNOSIS — J47.9 BRONCHIECTASIS WITHOUT COMPLICATION (H): ICD-10-CM

## 2023-08-03 DIAGNOSIS — E78.00 HIGH BLOOD CHOLESTEROL: ICD-10-CM

## 2023-08-03 LAB
ALBUMIN UR-MCNC: NEGATIVE MG/DL
APPEARANCE UR: CLEAR
BACTERIA #/AREA URNS HPF: ABNORMAL /HPF
BILIRUB UR QL STRIP: NEGATIVE
CHOLEST SERPL-MCNC: 224 MG/DL
COLOR UR AUTO: YELLOW
GLUCOSE UR STRIP-MCNC: NEGATIVE MG/DL
HAV IGG SER QL IA: NONREACTIVE
HBA1C MFR BLD: 6.2 % (ref 0–5.6)
HBV SURFACE AB SERPL IA-ACNC: 1.27 M[IU]/ML
HBV SURFACE AB SERPL IA-ACNC: NONREACTIVE M[IU]/ML
HBV SURFACE AG SERPL QL IA: NONREACTIVE
HDLC SERPL-MCNC: 96 MG/DL
HGB UR QL STRIP: ABNORMAL
KETONES UR STRIP-MCNC: NEGATIVE MG/DL
LDLC SERPL CALC-MCNC: 110 MG/DL
LEUKOCYTE ESTERASE UR QL STRIP: NEGATIVE
MUCOUS THREADS #/AREA URNS LPF: PRESENT /LPF
NITRATE UR QL: NEGATIVE
NONHDLC SERPL-MCNC: 128 MG/DL
PH UR STRIP: 7 [PH] (ref 5–7)
RBC #/AREA URNS AUTO: ABNORMAL /HPF
SP GR UR STRIP: 1.02 (ref 1–1.03)
SQUAMOUS #/AREA URNS AUTO: ABNORMAL /LPF
TRIGL SERPL-MCNC: 90 MG/DL
TSH SERPL DL<=0.005 MIU/L-ACNC: 1.18 UIU/ML (ref 0.3–4.2)
UROBILINOGEN UR STRIP-ACNC: 0.2 E.U./DL
WBC #/AREA URNS AUTO: ABNORMAL /HPF

## 2023-08-03 PROCEDURE — 36415 COLL VENOUS BLD VENIPUNCTURE: CPT | Performed by: FAMILY MEDICINE

## 2023-08-03 PROCEDURE — 90471 IMMUNIZATION ADMIN: CPT | Performed by: FAMILY MEDICINE

## 2023-08-03 PROCEDURE — 84443 ASSAY THYROID STIM HORMONE: CPT | Performed by: FAMILY MEDICINE

## 2023-08-03 PROCEDURE — 87186 SC STD MICRODIL/AGAR DIL: CPT | Performed by: FAMILY MEDICINE

## 2023-08-03 PROCEDURE — 86708 HEPATITIS A ANTIBODY: CPT | Performed by: FAMILY MEDICINE

## 2023-08-03 PROCEDURE — 87340 HEPATITIS B SURFACE AG IA: CPT | Performed by: FAMILY MEDICINE

## 2023-08-03 PROCEDURE — 90677 PCV20 VACCINE IM: CPT | Performed by: FAMILY MEDICINE

## 2023-08-03 PROCEDURE — 99397 PER PM REEVAL EST PAT 65+ YR: CPT | Mod: 25 | Performed by: FAMILY MEDICINE

## 2023-08-03 PROCEDURE — 81001 URINALYSIS AUTO W/SCOPE: CPT | Performed by: FAMILY MEDICINE

## 2023-08-03 PROCEDURE — 83036 HEMOGLOBIN GLYCOSYLATED A1C: CPT | Performed by: FAMILY MEDICINE

## 2023-08-03 PROCEDURE — 86706 HEP B SURFACE ANTIBODY: CPT | Performed by: FAMILY MEDICINE

## 2023-08-03 PROCEDURE — 87088 URINE BACTERIA CULTURE: CPT | Performed by: FAMILY MEDICINE

## 2023-08-03 PROCEDURE — 80061 LIPID PANEL: CPT | Performed by: FAMILY MEDICINE

## 2023-08-03 PROCEDURE — 87086 URINE CULTURE/COLONY COUNT: CPT | Performed by: FAMILY MEDICINE

## 2023-08-03 PROCEDURE — 99214 OFFICE O/P EST MOD 30 MIN: CPT | Mod: 25 | Performed by: FAMILY MEDICINE

## 2023-08-03 RX ORDER — LOSARTAN POTASSIUM 50 MG/1
75 TABLET ORAL DAILY
Qty: 135 TABLET | Refills: 1 | Status: SHIPPED | OUTPATIENT
Start: 2023-08-03 | End: 2024-02-28

## 2023-08-03 ASSESSMENT — ENCOUNTER SYMPTOMS
SHORTNESS OF BREATH: 0
DIARRHEA: 0
JOINT SWELLING: 1
HEMATURIA: 0
BREAST MASS: 0
CHILLS: 0
HEADACHES: 0
EYE PAIN: 0
DYSURIA: 0
NAUSEA: 0
ARTHRALGIAS: 1
HEARTBURN: 0
WEAKNESS: 0
PARESTHESIAS: 0
HEMATOCHEZIA: 0
FEVER: 0
DIZZINESS: 0
SORE THROAT: 0
FREQUENCY: 1
MYALGIAS: 0
COUGH: 0
ABDOMINAL PAIN: 0
PALPITATIONS: 0
NERVOUS/ANXIOUS: 0
CONSTIPATION: 0

## 2023-08-03 ASSESSMENT — PAIN SCALES - GENERAL: PAINLEVEL: NO PAIN (0)

## 2023-08-03 ASSESSMENT — ACTIVITIES OF DAILY LIVING (ADL): CURRENT_FUNCTION: NO ASSISTANCE NEEDED

## 2023-08-03 NOTE — PROGRESS NOTES
"SUBJECTIVE:   Sheila is a 63 year old who presents for Preventive Visit.      8/2/2023     2:25 PM   Additional Questions   Roomed by justen       Are you in the first 12 months of your Medicare coverage?  No    Healthy Habits:     In general, how would you rate your overall health?  Good    Frequency of exercise:  2-3 days/week    Duration of exercise:  Less than 15 minutes    Do you usually eat at least 4 servings of fruit and vegetables a day, include whole grains    & fiber and avoid regularly eating high fat or \"junk\" foods?  Yes    Taking medications regularly:  Yes    Medication side effects:  None    Ability to successfully perform activities of daily living:  No assistance needed    Home Safety:  No safety concerns identified    Hearing Impairment:  No hearing concerns    In the past 6 months, have you been bothered by leaking of urine?  No    In general, how would you rate your overall mental or emotional health?  Fair    Additional concerns today:  No      CT from her oncologist  Pancreatic cancer about 10 years ago, she  sees them annually      Have you ever done Advance Care Planning? (For example, a Health Directive, POLST, or a discussion with a medical provider or your loved ones about your wishes): No, advance care planning information given to patient to review.  Patient plans to discuss their wishes with loved ones or provider.         Fall risk  Fallen 2 or more times in the past year?: No  Any fall with injury in the past year?: No    Cognitive Screening   1) Repeat 3 items (Leader, Season, Table)    2) Clock draw: NORMAL  3) 3 item recall: Recalls 3 objects  Results: 3 items recalled: COGNITIVE IMPAIRMENT LESS LIKELY    Mini-CogTM Copyright GARDENIA Rebolledo. Licensed by the author for use in [facility]; reprinted with permission (lencho@.[email or web address]). All rights reserved.      Do you have sleep apnea, excessive snoring or daytime drowsiness? : no    Reviewed and updated as needed this visit by " clinical staff   Tobacco  Allergies               Reviewed and updated as needed this visit by Provider                 Social History     Tobacco Use    Smoking status: Former     Packs/day: 1.00     Years: 15.00     Pack years: 15.00     Types: Cigarettes     Quit date: 1990     Years since quittin.2    Smokeless tobacco: Never    Tobacco comments:     quit 16-17 years ago   Substance Use Topics    Alcohol use: Yes     Comment: social - 5 drinks a week             2023     8:44 PM   Alcohol Use   Prescreen: >3 drinks/day or >7 drinks/week? No     Do you have a current opioid prescription? No  Do you use any other controlled substances or medications that are not prescribed by a provider? None          Current providers sharing in care for this patient include:   Patient Care Team:  Jhon Abbott DO as PCP - General (Family Medicine)  Kate Talbot MD as MD (OB/Gyn)  Jai Parker MD as Referring Physician (Oncology)  Jhon Abbott DO as Assigned PCP  Jai Parker MD as Assigned Cancer Care Provider    The following health maintenance items are reviewed in Epic and correct as of today:  Health Maintenance   Topic Date Due    ZOSTER IMMUNIZATION (1 of 2) Never done    ASTHMA ACTION PLAN  2019    COVID-19 Vaccine (3 - Pfizer series) 2021    ANNUAL REVIEW OF HM ORDERS  2023    MEDICARE ANNUAL WELLNESS VISIT  2023    INFLUENZA VACCINE (1) 2023    ASTHMA CONTROL TEST  2024    FALL RISK ASSESSMENT  2024    DEXA  2024    MAMMO SCREENING  2025    ADVANCE CARE PLANNING  2026    LIPID  10/31/2027    COLORECTAL CANCER SCREENING  2029    DTAP/TDAP/TD IMMUNIZATION (3 - Td or Tdap) 2032    HEPATITIS C SCREENING  Completed    HIV SCREENING  Completed    PHQ-2 (once per calendar year)  Completed    Pneumococcal Vaccine: 65+ Years  Completed    IPV IMMUNIZATION  Aged Out    MENINGITIS IMMUNIZATION   Aged Out    PAP  Discontinued     BP Readings from Last 3 Encounters:   08/03/23 130/74   12/08/22 (!) 144/84   10/31/22 (!) 156/86    Wt Readings from Last 3 Encounters:   08/03/23 59.4 kg (131 lb)   12/08/22 61.2 kg (135 lb)   10/31/22 60.8 kg (134 lb)                  Last 3 Pap and HPV Results:       Latest Ref Rng & Units 6/7/2022     7:28 AM 8/11/2017     3:05 PM 8/11/2017     2:57 PM   PAP / HPV   PAP  Negative for Intraepithelial Lesion or Malignancy (NILM)      PAP (Historical)    NIL    HPV 16 DNA Negative Negative  Negative     HPV 18 DNA Negative Negative  Negative     Other HR HPV Negative Negative  Negative         FHS-7:       4/20/2022     8:35 AM 5/4/2023     8:28 AM   Breast CA Risk Assessment (FHS-7)   Did any of your first-degree relatives have breast or ovarian cancer? No No   Did any of your relatives have bilateral breast cancer? No No   Did any man in your family have breast cancer? No No   Did any woman in your family have breast and ovarian cancer? No No   Did any woman in your family have breast cancer before age 50 y? No No   Do you have 2 or more relatives with breast and/or ovarian cancer? No No   Do you have 2 or more relatives with breast and/or bowel cancer? No No     click delete button to remove this line now  Mammogram Screening: Recommended mammography every 1-2 years with patient discussion and risk factor consideration  Pertinent mammograms are reviewed under the imaging tab.    Review of Systems   Constitutional:  Negative for chills and fever.   HENT:  Negative for congestion, ear pain, hearing loss and sore throat.    Eyes:  Negative for pain and visual disturbance.   Respiratory:  Negative for cough and shortness of breath.    Cardiovascular:  Negative for chest pain, palpitations and peripheral edema.   Gastrointestinal:  Negative for abdominal pain, constipation, diarrhea, heartburn, hematochezia and nausea.   Breasts:  Negative for tenderness, breast mass and discharge.  "  Genitourinary:  Positive for frequency. Negative for dysuria, genital sores, hematuria, pelvic pain, urgency, vaginal bleeding and vaginal discharge.   Musculoskeletal:  Positive for arthralgias and joint swelling. Negative for myalgias.   Skin:  Negative for rash.   Neurological:  Negative for dizziness, weakness, headaches and paresthesias.   Psychiatric/Behavioral:  Negative for mood changes. The patient is not nervous/anxious.      Constitutional, HEENT, cardiovascular, pulmonary, GI, , musculoskeletal, neuro, skin, endocrine and psych systems are negative, except as otherwise noted.    OBJECTIVE:   /74   Pulse 70   Temp 98  F (36.7  C) (Oral)   Resp 16   Ht 1.664 m (5' 5.5\")   Wt 59.4 kg (131 lb)   LMP 03/24/2006   SpO2 100%   BMI 21.47 kg/m   Estimated body mass index is 21.47 kg/m  as calculated from the following:    Height as of this encounter: 1.664 m (5' 5.5\").    Weight as of this encounter: 59.4 kg (131 lb).  Physical Exam  GENERAL: healthy, alert and no distress  EYES: Eyes grossly normal to inspection, PERRL and conjunctivae and sclerae normal  HENT: ear canals and TM's normal, nose and mouth without ulcers or lesions  NECK: no adenopathy, no asymmetry, masses, or scars and thyroid normal to palpation  RESP: lungs clear to auscultation - no rales, rhonchi or wheezes  BREAST: normal without masses, tenderness or nipple discharge and no palpable axillary masses or adenopathy  CV: regular rate and rhythm, normal S1 S2, no S3 or S4, no murmur, click or rub, no peripheral edema and peripheral pulses strong  ABDOMEN: soft, nontender, no hepatosplenomegaly, no masses and bowel sounds normal   (female): normal female external genitalia, normal urethral meatus, vaginal mucosa pink, moist, well rugated, and normal cervix/adnexa/uterus without masses or discharge  MS: no gross musculoskeletal defects noted, no edema  SKIN: no suspicious lesions or rashes  NEURO: Normal strength and tone, " mentation intact and speech normal  PSYCH: mentation appears normal, affect normal/bright        ASSESSMENT / PLAN:       ICD-10-CM    1. Routine general medical examination at a health care facility  Z00.00       2. Prediabetes  R73.03 TSH with free T4 reflex     Hemoglobin A1c     TSH with free T4 reflex     Hemoglobin A1c     AMB Adult Diabetes Educator Referral      3. Hypertension goal BP (blood pressure) < 140/90  I10 TSH with free T4 reflex     Hemoglobin A1c     TSH with free T4 reflex     Hemoglobin A1c      4. High blood cholesterol  E78.00 Lipid panel reflex to direct LDL Fasting     Lipid panel reflex to direct LDL Fasting      5. Urinary frequency  R35.0 UA Macroscopic with reflex to Microscopic and Culture - Lab Collect     UA Macroscopic with reflex to Microscopic and Culture - Lab Collect     UA Microscopic with Reflex to Culture     Urine Culture Aerobic Bacterial - lab collect     Urine Culture Aerobic Bacterial - lab collect      6. Immunity status testing  Z01.84 Hepatitis B Surface Antibody     Hepatitis Antibody A IgG     Hepatitis B surface antigen     Hepatitis B Surface Antibody     Hepatitis Antibody A IgG     Hepatitis B surface antigen        Prediabetes-diet controlled Labs are slightly worse today.  Strongly advised following up with diabetes education for nutritional education.  Follow-up in 3 months to recheck.      History of pancreatic cancer-has been followed by oncology almost 10 years out from treatment.  She has no significant concerns.  She does get CT scans and has 1 upcoming imaging test.    Hypertension-stable continue current meds    Check fasting cholesterol    Hepatitis a and B immunity test to be done, pneumonia shot updated  Urinary frequency-UA to be done today no signs of infection  Patient has been advised of split billing requirements and indicates understanding: Yes      COUNSELING:  Reviewed preventive health counseling, as reflected in patient instructions        Regular exercise       Healthy diet/nutrition       Vision screening       Hearing screening       Dental care       Bladder control       Aspirin prophylaxis        Alcohol Use        Folic Acid        She reports that she quit smoking about 33 years ago. Her smoking use included cigarettes. She has a 15.00 pack-year smoking history. She has never used smokeless tobacco.      Appropriate preventive services were discussed with this patient, including applicable screening as appropriate for cardiovascular disease, diabetes, osteopenia/osteoporosis, and glaucoma.  As appropriate for age/gender, discussed screening for colorectal cancer, prostate cancer, breast cancer, and cervical cancer. Checklist reviewing preventive services available has been given to the patient.    Reviewed patients plan of care and provided an AVS. The Intermediate Care Plan ( asthma action plan, low back pain action plan, and migraine action plan) for Kiersten meets the Care Plan requirement. This Care Plan has been established and reviewed with the Patient.          DO BRENDON [facility]    Identified Health Risks:

## 2023-08-03 NOTE — PATIENT INSTRUCTIONS
Please continue all the meds as planned  Your predm is slightly worse at 6.2%  please follow up with DIABETES [facility]LLIT[facility] education and follow up with me in 3 months for recheck, I would advise making an appoint today  Your urine has some bacteria, but no infection, will make sure with a urine culture  Take care      Jhon Abbott D.O.    Preventive Health Recommendations    See your health care provider every year to  Review health changes.   Discuss preventive care.    Review your medicines if your doctor has prescribed any.    You no longer need a yearly Pap test unless you've had an abnormal Pap test in the past 10 years. If you have vaginal symptoms, such as bleeding or discharge, be sure to talk with your provider about a Pap test.    Every 1 to 2 years, have a mammogram.  If you are over 69, talk with your health care provider about whether or not you want to continue having screening mammograms.    Every 10 years, have a colonoscopy. Or, have a yearly FIT test (stool test). These exams will check for colon cancer.     Have a cholesterol test every 5 years, or more often if your doctor advises it.     Have a diabetes test (fasting glucose) every three years. If you are at risk for diabetes, you should have this test more often.     At age 65, have a bone density scan (DEXA) to check for osteoporosis (brittle bone disease).    Shots:  Get a flu shot each year.  Get a tetanus shot every 10 years.  Talk to your doctor about your pneumonia vaccines. There are now two you should receive - Pneumovax (PPSV 23) and Prevnar (PCV 13).  Talk to your pharmacist about the shingles vaccine.  Talk to your doctor about the hepatitis B vaccine.    Nutrition:   Eat at least 5 servings of fruits and vegetables each day.    Eat whole-grain bread, whole-wheat pasta and brown rice instead of white grains and rice.    Get adequate about Calcium and Vitamin D.     Lifestyle  Exercise at least 150 minutes a week (30 minutes a day, 5  days a week). This will help you control your weight and prevent disease.    Limit alcohol to one drink per day.    No smoking.     Wear sunscreen to prevent skin cancer.     See your dentist twice a year for an exam and cleaning.    See your eye doctor every 1 to 2 years to screen for conditions such as glaucoma, macular degeneration, cataracts, etc.    Personalized Prevention Plan  You are due for the preventive services outlined below.  Your care team is available to assist you in scheduling these services.  If you have already completed any of these items, please share that information with your care team to update in your medical record.    Health Maintenance Due   Topic Date Due    Zoster (Shingles) Vaccine (1 of 2) Never done    Pneumococcal Vaccine (2 - PCV) 07/10/2014    Asthma Action Plan - yearly  06/18/2019    COVID-19 Vaccine (3 - Pfizer series) 07/30/2021    ANNUAL REVIEW OF HM ORDERS  06/07/2023    Annual Wellness Visit  07/28/2023

## 2023-08-07 ENCOUNTER — TELEPHONE (OUTPATIENT)
Dept: FAMILY MEDICINE | Facility: CLINIC | Age: 63
End: 2023-08-07
Payer: COMMERCIAL

## 2023-08-07 LAB
BACTERIA UR CULT: ABNORMAL
BACTERIA UR CULT: ABNORMAL

## 2023-08-07 RX ORDER — CEPHALEXIN 500 MG/1
500 CAPSULE ORAL 3 TIMES DAILY
Qty: 21 CAPSULE | Refills: 0 | Status: SHIPPED | OUTPATIENT
Start: 2023-08-07 | End: 2023-08-14

## 2023-08-07 NOTE — TELEPHONE ENCOUNTER
RN called and relayed provider message    Patient verbalized understanding and in agreement with plan of care.     Amy Altamirano RN

## 2023-08-07 NOTE — TELEPHONE ENCOUNTER
----- Message from Jhon Abbott DO sent at 8/7/2023  6:50 AM CDT -----  Your urine showed some bacteria, I will send some antibiotics to your pharmacy  Monitor symptoms  Take care  Jhon Abbott D.O.

## 2023-08-21 ENCOUNTER — LAB (OUTPATIENT)
Dept: LAB | Facility: CLINIC | Age: 63
End: 2023-08-21
Payer: COMMERCIAL

## 2023-08-21 ENCOUNTER — ANCILLARY PROCEDURE (OUTPATIENT)
Dept: CT IMAGING | Facility: CLINIC | Age: 63
End: 2023-08-21
Attending: INTERNAL MEDICINE
Payer: COMMERCIAL

## 2023-08-21 ENCOUNTER — ONCOLOGY VISIT (OUTPATIENT)
Dept: ONCOLOGY | Facility: CLINIC | Age: 63
End: 2023-08-21
Attending: INTERNAL MEDICINE
Payer: COMMERCIAL

## 2023-08-21 VITALS
TEMPERATURE: 98.3 F | HEART RATE: 80 BPM | WEIGHT: 133.6 LBS | SYSTOLIC BLOOD PRESSURE: 137 MMHG | BODY MASS INDEX: 21.89 KG/M2 | RESPIRATION RATE: 16 BRPM | DIASTOLIC BLOOD PRESSURE: 85 MMHG | OXYGEN SATURATION: 98 %

## 2023-08-21 DIAGNOSIS — C25.1 MALIGNANT NEOPLASM OF BODY OF PANCREAS (H): Primary | ICD-10-CM

## 2023-08-21 DIAGNOSIS — C25.1 MALIGNANT NEOPLASM OF BODY OF PANCREAS (H): ICD-10-CM

## 2023-08-21 DIAGNOSIS — J47.9 BRONCHIECTASIS WITHOUT COMPLICATION (H): ICD-10-CM

## 2023-08-21 DIAGNOSIS — R73.03 PREDIABETES: ICD-10-CM

## 2023-08-21 LAB
ALBUMIN SERPL BCG-MCNC: 4.1 G/DL (ref 3.5–5.2)
ALP SERPL-CCNC: 78 U/L (ref 35–104)
ALT SERPL W P-5'-P-CCNC: 26 U/L (ref 0–50)
ANION GAP SERPL CALCULATED.3IONS-SCNC: 8 MMOL/L (ref 7–15)
AST SERPL W P-5'-P-CCNC: 22 U/L (ref 0–45)
BASOPHILS # BLD AUTO: 0.1 10E3/UL (ref 0–0.2)
BASOPHILS NFR BLD AUTO: 2 %
BILIRUB SERPL-MCNC: 0.5 MG/DL
BUN SERPL-MCNC: 13.4 MG/DL (ref 8–23)
CALCIUM SERPL-MCNC: 9.3 MG/DL (ref 8.8–10.2)
CHLORIDE SERPL-SCNC: 103 MMOL/L (ref 98–107)
CREAT SERPL-MCNC: 0.8 MG/DL (ref 0.51–0.95)
DEPRECATED HCO3 PLAS-SCNC: 27 MMOL/L (ref 22–29)
EOSINOPHIL # BLD AUTO: 0.1 10E3/UL (ref 0–0.7)
EOSINOPHIL NFR BLD AUTO: 2 %
ERYTHROCYTE [DISTWIDTH] IN BLOOD BY AUTOMATED COUNT: 12.4 % (ref 10–15)
GFR SERPL CREATININE-BSD FRML MDRD: 81 ML/MIN/1.73M2
GLUCOSE SERPL-MCNC: 78 MG/DL (ref 70–99)
HCT VFR BLD AUTO: 38.8 % (ref 35–47)
HGB BLD-MCNC: 12.9 G/DL (ref 11.7–15.7)
IMM GRANULOCYTES # BLD: 0 10E3/UL
IMM GRANULOCYTES NFR BLD: 0 %
LYMPHOCYTES # BLD AUTO: 1.1 10E3/UL (ref 0.8–5.3)
LYMPHOCYTES NFR BLD AUTO: 30 %
MCH RBC QN AUTO: 29.9 PG (ref 26.5–33)
MCHC RBC AUTO-ENTMCNC: 33.2 G/DL (ref 31.5–36.5)
MCV RBC AUTO: 90 FL (ref 78–100)
MONOCYTES # BLD AUTO: 0.4 10E3/UL (ref 0–1.3)
MONOCYTES NFR BLD AUTO: 9 %
NEUTROPHILS # BLD AUTO: 2.2 10E3/UL (ref 1.6–8.3)
NEUTROPHILS NFR BLD AUTO: 57 %
NRBC # BLD AUTO: 0 10E3/UL
NRBC BLD AUTO-RTO: 0 /100
PLATELET # BLD AUTO: 221 10E3/UL (ref 150–450)
POTASSIUM SERPL-SCNC: 3.8 MMOL/L (ref 3.4–5.3)
PROT SERPL-MCNC: 6.7 G/DL (ref 6.4–8.3)
RBC # BLD AUTO: 4.31 10E6/UL (ref 3.8–5.2)
SODIUM SERPL-SCNC: 138 MMOL/L (ref 136–145)
WBC # BLD AUTO: 3.8 10E3/UL (ref 4–11)

## 2023-08-21 PROCEDURE — 86316 IMMUNOASSAY TUMOR OTHER: CPT | Mod: 90 | Performed by: PATHOLOGY

## 2023-08-21 PROCEDURE — 99213 OFFICE O/P EST LOW 20 MIN: CPT | Performed by: INTERNAL MEDICINE

## 2023-08-21 PROCEDURE — 80053 COMPREHEN METABOLIC PANEL: CPT | Performed by: PATHOLOGY

## 2023-08-21 PROCEDURE — 36415 COLL VENOUS BLD VENIPUNCTURE: CPT | Performed by: PATHOLOGY

## 2023-08-21 PROCEDURE — 85025 COMPLETE CBC W/AUTO DIFF WBC: CPT | Performed by: PATHOLOGY

## 2023-08-21 PROCEDURE — G0463 HOSPITAL OUTPT CLINIC VISIT: HCPCS | Performed by: INTERNAL MEDICINE

## 2023-08-21 PROCEDURE — 74178 CT ABD&PLV WO CNTR FLWD CNTR: CPT | Mod: GC | Performed by: STUDENT IN AN ORGANIZED HEALTH CARE EDUCATION/TRAINING PROGRAM

## 2023-08-21 PROCEDURE — 99000 SPECIMEN HANDLING OFFICE-LAB: CPT | Performed by: PATHOLOGY

## 2023-08-21 PROCEDURE — 71270 CT THORAX DX C-/C+: CPT | Mod: GC | Performed by: STUDENT IN AN ORGANIZED HEALTH CARE EDUCATION/TRAINING PROGRAM

## 2023-08-21 RX ORDER — IOPAMIDOL 755 MG/ML
75 INJECTION, SOLUTION INTRAVASCULAR ONCE
Status: COMPLETED | OUTPATIENT
Start: 2023-08-21 | End: 2023-08-21

## 2023-08-21 RX ADMIN — IOPAMIDOL 75 ML: 755 INJECTION, SOLUTION INTRAVASCULAR at 11:22

## 2023-08-21 ASSESSMENT — PAIN SCALES - GENERAL: PAINLEVEL: NO PAIN (0)

## 2023-08-21 NOTE — PROGRESS NOTES
I am seeing Sheila Massey today in follow-up of resected neuroendocrine tumor of the pancreas.  She is 63 years old and had a central pancreatectomy in 2013 for a stage I grade 2 well-differentiated endocrine tumor of her pancreas.  She had no hormone related symptoms prior to surgery and has been free of evidence of disease since.  She returns today for ongoing follow-up after having had some additional evaluation last year because of problems with diarrhea.  She tells me that currently her diarrhea has resolved.  She generally has been feeling well.  Her chronic respiratory symptoms related bronchiectasis are unchanged.    On exam today she is alert and well-appearing.  She appears her stated age.  Her vital signs are unremarkable.  She has no icterus.  She has no palpable adenopathy in the neck or supraclavicular spaces.  Her lungs are clear.  Her heart rate and rhythm are regular.  Her abdomen is soft nontender without palpable mass organomegaly.  She has no peripheral edema.  Her gait and station are unremarkable.    I personally reviewed her CT scan and went over the results of in the images with the patient and her .  I do not yet have the radiologist interpretation but to my review has nothing to suggest recurrence of her tumor.  She has some small probable IPMN's in the head of her pancreas.  Those are unchanged over years.    She has normal electrolytes and renal function.  A chromogranin A level is pending.  Her bilirubin, albumin liver enzymes are normal.  Her blood counts are unremarkable.    Assessment/plan: History of intermediate grade neuroendocrine tumor of the pancreas now 10 years without evidence of disease.  I do not think we need to plan regular follow-up for this.  I reviewed with her symptoms that might suggest recurrence at should bring her back to our attention.

## 2023-08-21 NOTE — LETTER
8/21/2023         RE: Kiersten Massey  [address] [facility] [address]      I am seeing Sheila Massey today in follow-up of resected neuroendocrine tumor of the pancreas.  She is 63 years old and had a central pancreatectomy in 2013 for a stage I grade 2 well-differentiated endocrine tumor of her pancreas.  She had no hormone related symptoms prior to surgery and has been free of evidence of disease since.  She returns today for ongoing follow-up after having had some additional evaluation last year because of problems with diarrhea.  She tells me that currently her diarrhea has resolved.  She generally has been feeling well.  Her chronic respiratory symptoms related bronchiectasis are unchanged.    On exam today she is alert and well-appearing.  She appears her stated age.  Her vital signs are unremarkable.  She has no icterus.  She has no palpable adenopathy in the neck or supraclavicular spaces.  Her lungs are clear.  Her heart rate and rhythm are regular.  Her abdomen is soft nontender without palpable mass organomegaly.  She has no peripheral edema.  Her gait and station are unremarkable.    I personally reviewed her CT scan and went over the results of in the images with the patient and her .  I do not yet have the radiologist interpretation but to my review has nothing to suggest recurrence of her tumor.  She has some small probable IPMN's in the head of her pancreas.  Those are unchanged over years.    She has normal electrolytes and renal function.  A chromogranin A level is pending.  Her bilirubin, albumin liver enzymes are normal.  Her blood counts are unremarkable.    Assessment/plan: History of intermediate grade neuroendocrine tumor of the pancreas now 10 years without evidence of disease.  I do not think we need to plan regular follow-up for this.  I reviewed with her symptoms that might suggest recurrence at should bring her back to our attention.      Jai Parker,  MD

## 2023-08-21 NOTE — NURSING NOTE
"Oncology Rooming Note    August 21, 2023 1:33 PM   Kiersten Massey is a 63 year old female who presents for:    Chief Complaint   Patient presents with    Oncology Clinic Visit     RTN for Pancreatic Cancer     Initial Vitals: Blood Pressure 137/85   Pulse 80   Temperature 98.3  F (36.8  C) (Oral)   Respiration 16   Weight 60.6 kg (133 lb 9.6 oz)   Last Menstrual Period 03/24/2006   Oxygen Saturation 98%   Body Mass Index 21.89 kg/m   Estimated body mass index is 21.89 kg/m  as calculated from the following:    Height as of 8/3/23: 1.664 m (5' 5.5\").    Weight as of this encounter: 60.6 kg (133 lb 9.6 oz). Body surface area is 1.67 meters squared.  No Pain (0) Comment: Data Unavailable   Patient's last menstrual period was 03/24/2006.  Allergies reviewed: Yes  Medications reviewed: Yes    Medications: Medication refills not needed today.  Pharmacy name entered into [facility]:    [id number] - [place], [place] - [address] AT [facility] & [address] [place] [address] [facility] - [place], [place] - [address] [facility] - [place], [place] - [address] [facility].  [facility] - [place], [place] - [address]    Clinical concerns: none       Helga Ramirez MA             "

## 2023-08-21 NOTE — LETTER
8/21/2023         RE: Kiersten Massey  [address] [facility] [address]        Dear Colleague,    Thank you for referring your patient, Kiersten Massey, to the [facility]. Please see a copy of my visit note below.        I am seeing Sheila Massey today in follow-up of resected neuroendocrine tumor of the pancreas.  She is 63 years old and had a central pancreatectomy in 2013 for a stage I grade 2 well-differentiated endocrine tumor of her pancreas.  She had no hormone related symptoms prior to surgery and has been free of evidence of disease since.  She returns today for ongoing follow-up after having had some additional evaluation last year because of problems with diarrhea.  She tells me that currently her diarrhea has resolved.  She generally has been feeling well.  Her chronic respiratory symptoms related bronchiectasis are unchanged.    On exam today she is alert and well-appearing.  She appears her stated age.  Her vital signs are unremarkable.  She has no icterus.  She has no palpable adenopathy in the neck or supraclavicular spaces.  Her lungs are clear.  Her heart rate and rhythm are regular.  Her abdomen is soft nontender without palpable mass organomegaly.  She has no peripheral edema.  Her gait and station are unremarkable.    I personally reviewed her CT scan and went over the results of in the images with the patient and her .  I do not yet have the radiologist interpretation but to my review has nothing to suggest recurrence of her tumor.  She has some small probable IPMN's in the head of her pancreas.  Those are unchanged over years.    She has normal electrolytes and renal function.  A chromogranin A level is pending.  Her bilirubin, albumin liver enzymes are normal.  Her blood counts are unremarkable.    Assessment/plan: History of intermediate grade neuroendocrine tumor of the pancreas now 10 years without evidence of disease.  I do not think we  need to plan regular follow-up for this.  I reviewed with her symptoms that might suggest recurrence at should bring her back to our attention.      Again, thank you for allowing me to participate in the care of your patient.        Sincerely,        Jai Parker MD

## 2023-08-23 LAB — CGA SERPL-MCNC: 73 NG/ML

## 2023-09-20 ENCOUNTER — TELEPHONE (OUTPATIENT)
Facility: CLINIC | Age: 63
End: 2023-09-20
Payer: COMMERCIAL

## 2023-09-20 DIAGNOSIS — J45.40 MODERATE PERSISTENT ASTHMA, UNSPECIFIED WHETHER COMPLICATED: Primary | ICD-10-CM

## 2023-09-20 NOTE — TELEPHONE ENCOUNTER
[facility] Call Center    Phone Message    May a detailed message be left on voicemail: yes     Reason for Call: Other: patient is wondering if PFT is needed at next appt please advise     Action Taken: Other: pulm    Travel Screening: Not Applicable

## 2023-09-21 ENCOUNTER — TELEPHONE (OUTPATIENT)
Dept: PULMONOLOGY | Facility: CLINIC | Age: 63
End: 2023-09-21
Payer: COMMERCIAL

## 2023-09-21 NOTE — TELEPHONE ENCOUNTER
Left Voicemail (1st Attempt) for the patient to call back and schedule the following:    Appointment type: FPFT  Provider: NADIR  Return date: 01/08/24  Specialty phone number: [phone]  Additional appointment(s) needed: N/A  Additonal Notes: N/A

## 2023-12-05 ENCOUNTER — OFFICE VISIT (OUTPATIENT)
Dept: PULMONOLOGY | Facility: CLINIC | Age: 63
End: 2023-12-05
Payer: COMMERCIAL

## 2023-12-05 DIAGNOSIS — J45.40 MODERATE PERSISTENT ASTHMA, UNSPECIFIED WHETHER COMPLICATED: ICD-10-CM

## 2023-12-05 LAB
DLCOUNC-%PRED-PRE: 94 %
DLCOUNC-PRE: 19.21 ML/MIN/MMHG
DLCOUNC-PRED: 20.32 ML/MIN/MMHG
ERV-%PRED-PRE: 103 %
ERV-PRE: 1.16 L
ERV-PRED: 1.12 L
EXPTIME-PRE: 5.58 SEC
FEF2575-%PRED-PRE: 56 %
FEF2575-PRE: 1.14 L/SEC
FEF2575-PRED: 2.03 L/SEC
FEFMAX-%PRED-PRE: 82 %
FEFMAX-PRE: 5.17 L/SEC
FEFMAX-PRED: 6.28 L/SEC
FEV1-%PRED-PRE: 85 %
FEV1-PRE: 1.98 L
FEV1FEV6-PRE: 66 %
FEV1FEV6-PRED: 80 %
FEV1FVC-PRE: 66 %
FEV1FVC-PRED: 79 %
FEV1SVC-PRE: 58 %
FEV1SVC-PRED: 69 %
FIFMAX-PRE: 6.4 L/SEC
FIO2-PRE: 21 %
FRCPLETH-%PRED-PRE: 134 %
FRCPLETH-PRE: 3.78 L
FRCPLETH-PRED: 2.82 L
FVC-%PRED-PRE: 101 %
FVC-PRE: 3.01 L
FVC-PRED: 2.96 L
IC-%PRED-PRE: 99 %
IC-PRE: 2.24 L
IC-PRED: 2.24 L
RVPLETH-%PRED-PRE: 126 %
RVPLETH-PRE: 2.62 L
RVPLETH-PRED: 2.08 L
TLCPLETH-%PRED-PRE: 114 %
TLCPLETH-PRE: 6.02 L
TLCPLETH-PRED: 5.27 L
VA-%PRED-PRE: 94 %
VA-PRE: 4.65 L
VC-%PRED-PRE: 100 %
VC-PRE: 3.39 L
VC-PRED: 3.38 L

## 2023-12-05 PROCEDURE — 94726 PLETHYSMOGRAPHY LUNG VOLUMES: CPT | Performed by: INTERNAL MEDICINE

## 2023-12-05 PROCEDURE — 94729 DIFFUSING CAPACITY: CPT | Performed by: INTERNAL MEDICINE

## 2023-12-05 PROCEDURE — 94375 RESPIRATORY FLOW VOLUME LOOP: CPT | Performed by: INTERNAL MEDICINE

## 2023-12-05 NOTE — PROGRESS NOTES
Kiersten Massey comes into clinic today at the request of Dr. Hamm Ordering Provider for PFT        This service provided today was under the supervising provider of the day Dr. Arben Logan, who was available if needed.    Quan Chen

## 2023-12-06 LAB
DLCOUNC-%PRED-PRE: 94 %
DLCOUNC-PRE: 19.21 ML/MIN/MMHG
DLCOUNC-PRED: 20.32 ML/MIN/MMHG
ERV-%PRED-PRE: 103 %
ERV-PRE: 1.16 L
ERV-PRED: 1.12 L
EXPTIME-PRE: 5.58 SEC
FEF2575-%PRED-PRE: 56 %
FEF2575-PRE: 1.14 L/SEC
FEF2575-PRED: 2.03 L/SEC
FEFMAX-%PRED-PRE: 82 %
FEFMAX-PRE: 5.17 L/SEC
FEFMAX-PRED: 6.28 L/SEC
FEV1-%PRED-PRE: 85 %
FEV1-PRE: 1.98 L
FEV1FEV6-PRE: 66 %
FEV1FEV6-PRED: 80 %
FEV1FVC-PRE: 66 %
FEV1FVC-PRED: 79 %
FEV1SVC-PRE: 58 %
FEV1SVC-PRED: 69 %
FIFMAX-PRE: 6.4 L/SEC
FRCPLETH-%PRED-PRE: 134 %
FRCPLETH-PRE: 3.78 L
FRCPLETH-PRED: 2.82 L
FVC-%PRED-PRE: 101 %
FVC-PRE: 3.01 L
FVC-PRED: 2.96 L
IC-%PRED-PRE: 99 %
IC-PRE: 2.24 L
IC-PRED: 2.24 L
RVPLETH-%PRED-PRE: 126 %
RVPLETH-PRE: 2.62 L
RVPLETH-PRED: 2.08 L
TLCPLETH-%PRED-PRE: 114 %
TLCPLETH-PRE: 6.02 L
TLCPLETH-PRED: 5.27 L
VA-%PRED-PRE: 94 %
VA-PRE: 4.65 L
VC-%PRED-PRE: 100 %
VC-PRE: 3.39 L
VC-PRED: 3.38 L

## 2024-01-08 ENCOUNTER — TELEPHONE (OUTPATIENT)
Facility: CLINIC | Age: 64
End: 2024-01-08
Payer: COMMERCIAL

## 2024-01-08 ENCOUNTER — OFFICE VISIT (OUTPATIENT)
Dept: PULMONOLOGY | Facility: CLINIC | Age: 64
End: 2024-01-08
Payer: COMMERCIAL

## 2024-01-08 VITALS
OXYGEN SATURATION: 98 % | WEIGHT: 132 LBS | BODY MASS INDEX: 21.21 KG/M2 | HEIGHT: 66 IN | HEART RATE: 81 BPM | RESPIRATION RATE: 17 BRPM | SYSTOLIC BLOOD PRESSURE: 158 MMHG | DIASTOLIC BLOOD PRESSURE: 91 MMHG

## 2024-01-08 DIAGNOSIS — J47.9 BRONCHIECTASIS WITHOUT COMPLICATION (H): Primary | ICD-10-CM

## 2024-01-08 PROCEDURE — 99214 OFFICE O/P EST MOD 30 MIN: CPT

## 2024-01-08 PROCEDURE — G0463 HOSPITAL OUTPT CLINIC VISIT: HCPCS

## 2024-01-08 RX ORDER — ALBUTEROL SULFATE 90 UG/1
2 AEROSOL, METERED RESPIRATORY (INHALATION) EVERY 6 HOURS PRN
Qty: 18 G | Refills: 3 | Status: SHIPPED | OUTPATIENT
Start: 2024-01-08

## 2024-01-08 ASSESSMENT — PAIN SCALES - GENERAL: PAINLEVEL: NO PAIN (0)

## 2024-01-08 NOTE — NURSING NOTE
Chief Complaint   Patient presents with    RECHECK     Return pulmonary infiltrate moderate persistent asthma     Medications reviewed and vital signs taken.   Vincent Gallardo, CMA
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
## 2024-02-27 DIAGNOSIS — I10 HYPERTENSION GOAL BP (BLOOD PRESSURE) < 140/90: ICD-10-CM

## 2024-02-28 RX ORDER — LOSARTAN POTASSIUM 50 MG/1
TABLET ORAL
Qty: 135 TABLET | Refills: 0 | Status: SHIPPED | OUTPATIENT
Start: 2024-02-28 | End: 2024-05-07

## 2024-05-01 ENCOUNTER — MYC REFILL (OUTPATIENT)
Dept: PULMONOLOGY | Facility: CLINIC | Age: 64
End: 2024-05-01
Payer: COMMERCIAL

## 2024-05-01 DIAGNOSIS — J47.9 BRONCHIECTASIS WITHOUT COMPLICATION (H): ICD-10-CM

## 2024-05-07 DIAGNOSIS — I10 HYPERTENSION GOAL BP (BLOOD PRESSURE) < 140/90: ICD-10-CM

## 2024-05-07 RX ORDER — LOSARTAN POTASSIUM 50 MG/1
TABLET ORAL
Qty: 135 TABLET | Refills: 0 | Status: SHIPPED | OUTPATIENT
Start: 2024-05-07 | End: 2024-08-05

## 2024-05-17 ENCOUNTER — ANCILLARY PROCEDURE (OUTPATIENT)
Dept: MAMMOGRAPHY | Facility: CLINIC | Age: 64
End: 2024-05-17
Attending: FAMILY MEDICINE
Payer: COMMERCIAL

## 2024-05-17 DIAGNOSIS — Z12.31 VISIT FOR SCREENING MAMMOGRAM: ICD-10-CM

## 2024-05-17 PROCEDURE — 77063 BREAST TOMOSYNTHESIS BI: CPT | Mod: TC | Performed by: STUDENT IN AN ORGANIZED HEALTH CARE EDUCATION/TRAINING PROGRAM

## 2024-05-17 PROCEDURE — 77067 SCR MAMMO BI INCL CAD: CPT | Mod: TC | Performed by: STUDENT IN AN ORGANIZED HEALTH CARE EDUCATION/TRAINING PROGRAM

## 2024-05-29 ENCOUNTER — ANCILLARY PROCEDURE (OUTPATIENT)
Dept: MAMMOGRAPHY | Facility: CLINIC | Age: 64
End: 2024-05-29
Attending: FAMILY MEDICINE
Payer: COMMERCIAL

## 2024-05-29 DIAGNOSIS — R92.8 ABNORMAL MAMMOGRAM: ICD-10-CM

## 2024-05-29 PROCEDURE — G0279 TOMOSYNTHESIS, MAMMO: HCPCS | Performed by: RADIOLOGY

## 2024-05-29 PROCEDURE — 77065 DX MAMMO INCL CAD UNI: CPT | Mod: LT | Performed by: RADIOLOGY

## 2024-08-01 SDOH — HEALTH STABILITY: PHYSICAL HEALTH: ON AVERAGE, HOW MANY DAYS PER WEEK DO YOU ENGAGE IN MODERATE TO STRENUOUS EXERCISE (LIKE A BRISK WALK)?: 2 DAYS

## 2024-08-01 SDOH — HEALTH STABILITY: PHYSICAL HEALTH: ON AVERAGE, HOW MANY MINUTES DO YOU ENGAGE IN EXERCISE AT THIS LEVEL?: 20 MIN

## 2024-08-01 ASSESSMENT — ASTHMA QUESTIONNAIRES
ACT_TOTALSCORE: 23
QUESTION_2 LAST FOUR WEEKS HOW OFTEN HAVE YOU HAD SHORTNESS OF BREATH: ONCE OR TWICE A WEEK
ACT_TOTALSCORE: 23
QUESTION_5 LAST FOUR WEEKS HOW WOULD YOU RATE YOUR ASTHMA CONTROL: WELL CONTROLLED
QUESTION_1 LAST FOUR WEEKS HOW MUCH OF THE TIME DID YOUR ASTHMA KEEP YOU FROM GETTING AS MUCH DONE AT WORK, SCHOOL OR AT HOME: NONE OF THE TIME
QUESTION_4 LAST FOUR WEEKS HOW OFTEN HAVE YOU USED YOUR RESCUE INHALER OR NEBULIZER MEDICATION (SUCH AS ALBUTEROL): NOT AT ALL
QUESTION_3 LAST FOUR WEEKS HOW OFTEN DID YOUR ASTHMA SYMPTOMS (WHEEZING, COUGHING, SHORTNESS OF BREATH, CHEST TIGHTNESS OR PAIN) WAKE YOU UP AT NIGHT OR EARLIER THAN USUAL IN THE MORNING: NOT AT ALL

## 2024-08-01 ASSESSMENT — SOCIAL DETERMINANTS OF HEALTH (SDOH): HOW OFTEN DO YOU GET TOGETHER WITH FRIENDS OR RELATIVES?: TWICE A WEEK

## 2024-08-05 ENCOUNTER — OFFICE VISIT (OUTPATIENT)
Dept: FAMILY MEDICINE | Facility: CLINIC | Age: 64
End: 2024-08-05
Payer: COMMERCIAL

## 2024-08-05 VITALS
RESPIRATION RATE: 16 BRPM | HEIGHT: 66 IN | DIASTOLIC BLOOD PRESSURE: 78 MMHG | BODY MASS INDEX: 21.05 KG/M2 | SYSTOLIC BLOOD PRESSURE: 138 MMHG | TEMPERATURE: 98 F | HEART RATE: 76 BPM | OXYGEN SATURATION: 100 % | WEIGHT: 131 LBS

## 2024-08-05 DIAGNOSIS — J47.9 BRONCHIECTASIS WITHOUT COMPLICATION (H): ICD-10-CM

## 2024-08-05 DIAGNOSIS — Z00.00 ENCOUNTER FOR MEDICARE ANNUAL WELLNESS EXAM: Primary | ICD-10-CM

## 2024-08-05 DIAGNOSIS — Z23 NEED FOR SHINGLES VACCINE: ICD-10-CM

## 2024-08-05 DIAGNOSIS — Z23 NEED FOR PROPHYLACTIC VACCINATION AGAINST HEPATITIS A AND HEPATITIS B IN ADULT: ICD-10-CM

## 2024-08-05 DIAGNOSIS — Z13.220 LIPID SCREENING: ICD-10-CM

## 2024-08-05 DIAGNOSIS — I10 HYPERTENSION GOAL BP (BLOOD PRESSURE) < 140/90: ICD-10-CM

## 2024-08-05 DIAGNOSIS — Z29.11 NEED FOR VACCINATION AGAINST RESPIRATORY SYNCYTIAL VIRUS: ICD-10-CM

## 2024-08-05 DIAGNOSIS — C25.1 MALIGNANT NEOPLASM OF BODY OF PANCREAS (H): ICD-10-CM

## 2024-08-05 DIAGNOSIS — R73.03 PREDIABETES: ICD-10-CM

## 2024-08-05 DIAGNOSIS — Z78.0 POST-MENOPAUSAL: ICD-10-CM

## 2024-08-05 DIAGNOSIS — Z13.820 SCREENING FOR OSTEOPOROSIS: ICD-10-CM

## 2024-08-05 LAB
ALBUMIN SERPL BCG-MCNC: 4.2 G/DL (ref 3.5–5.2)
ALP SERPL-CCNC: 76 U/L (ref 40–150)
ALT SERPL W P-5'-P-CCNC: 24 U/L (ref 0–50)
ANION GAP SERPL CALCULATED.3IONS-SCNC: 8 MMOL/L (ref 7–15)
AST SERPL W P-5'-P-CCNC: 22 U/L (ref 0–45)
BASOPHILS # BLD AUTO: 0 10E3/UL (ref 0–0.2)
BASOPHILS NFR BLD AUTO: 1 %
BILIRUB SERPL-MCNC: 0.5 MG/DL
BUN SERPL-MCNC: 10.7 MG/DL (ref 8–23)
CALCIUM SERPL-MCNC: 9.2 MG/DL (ref 8.8–10.4)
CHLORIDE SERPL-SCNC: 104 MMOL/L (ref 98–107)
CHOLEST SERPL-MCNC: 229 MG/DL
CREAT SERPL-MCNC: 0.76 MG/DL (ref 0.51–0.95)
EGFRCR SERPLBLD CKD-EPI 2021: 86 ML/MIN/1.73M2
EOSINOPHIL # BLD AUTO: 0.1 10E3/UL (ref 0–0.7)
EOSINOPHIL NFR BLD AUTO: 1 %
ERYTHROCYTE [DISTWIDTH] IN BLOOD BY AUTOMATED COUNT: 12.6 % (ref 10–15)
FASTING STATUS PATIENT QL REPORTED: YES
FASTING STATUS PATIENT QL REPORTED: YES
GLUCOSE SERPL-MCNC: 100 MG/DL (ref 70–99)
HBA1C MFR BLD: 5.8 % (ref 0–5.6)
HCO3 SERPL-SCNC: 27 MMOL/L (ref 22–29)
HCT VFR BLD AUTO: 39.9 % (ref 35–47)
HDLC SERPL-MCNC: 103 MG/DL
HGB BLD-MCNC: 12.9 G/DL (ref 11.7–15.7)
IMM GRANULOCYTES # BLD: 0 10E3/UL
IMM GRANULOCYTES NFR BLD: 0 %
LDLC SERPL CALC-MCNC: 107 MG/DL
LYMPHOCYTES # BLD AUTO: 1.1 10E3/UL (ref 0.8–5.3)
LYMPHOCYTES NFR BLD AUTO: 32 %
MCH RBC QN AUTO: 29.5 PG (ref 26.5–33)
MCHC RBC AUTO-ENTMCNC: 32.3 G/DL (ref 31.5–36.5)
MCV RBC AUTO: 91 FL (ref 78–100)
MONOCYTES # BLD AUTO: 0.3 10E3/UL (ref 0–1.3)
MONOCYTES NFR BLD AUTO: 8 %
NEUTROPHILS # BLD AUTO: 2 10E3/UL (ref 1.6–8.3)
NEUTROPHILS NFR BLD AUTO: 58 %
NONHDLC SERPL-MCNC: 126 MG/DL
PLATELET # BLD AUTO: 236 10E3/UL (ref 150–450)
POTASSIUM SERPL-SCNC: 4.3 MMOL/L (ref 3.4–5.3)
PROT SERPL-MCNC: 7 G/DL (ref 6.4–8.3)
RBC # BLD AUTO: 4.38 10E6/UL (ref 3.8–5.2)
SODIUM SERPL-SCNC: 139 MMOL/L (ref 135–145)
TRIGL SERPL-MCNC: 97 MG/DL
WBC # BLD AUTO: 3.5 10E3/UL (ref 4–11)

## 2024-08-05 PROCEDURE — 83036 HEMOGLOBIN GLYCOSYLATED A1C: CPT | Performed by: FAMILY MEDICINE

## 2024-08-05 PROCEDURE — G0402 INITIAL PREVENTIVE EXAM: HCPCS | Performed by: FAMILY MEDICINE

## 2024-08-05 PROCEDURE — 80061 LIPID PANEL: CPT | Performed by: FAMILY MEDICINE

## 2024-08-05 PROCEDURE — 80053 COMPREHEN METABOLIC PANEL: CPT | Performed by: FAMILY MEDICINE

## 2024-08-05 PROCEDURE — 36415 COLL VENOUS BLD VENIPUNCTURE: CPT | Performed by: FAMILY MEDICINE

## 2024-08-05 PROCEDURE — 99214 OFFICE O/P EST MOD 30 MIN: CPT | Mod: 25 | Performed by: FAMILY MEDICINE

## 2024-08-05 PROCEDURE — 85025 COMPLETE CBC W/AUTO DIFF WBC: CPT | Performed by: FAMILY MEDICINE

## 2024-08-05 RX ORDER — LOSARTAN POTASSIUM 50 MG/1
75 TABLET ORAL DAILY
Qty: 135 TABLET | Refills: 1 | Status: SHIPPED | OUTPATIENT
Start: 2024-08-05

## 2024-08-05 ASSESSMENT — PAIN SCALES - GENERAL: PAINLEVEL: NO PAIN (0)

## 2024-08-05 NOTE — PATIENT INSTRUCTIONS
Your prediabetes is stable  Continue current dietary plans and blood pressure medication  I would recommend recheck in 6 months(please make your appoint asap)    Jhon Abbott D.O.        Patient Education   Preventive Care Advice   This is general advice given by our system to help you stay healthy. However, your care team may have specific advice just for you. Please talk to your care team about your preventive care needs.  Nutrition  Eat 5 or more servings of fruits and vegetables each day.  Try wheat bread, brown rice and whole grain pasta (instead of white bread, rice, and pasta).  Get enough calcium and vitamin D. Check the label on foods and aim for 100% of the RDA (recommended daily allowance).  Lifestyle  Exercise at least 150 minutes each week  (30 minutes a day, 5 days a week).  Do muscle strengthening activities 2 days a week. These help control your weight and prevent disease.  No smoking.  Wear sunscreen to prevent skin cancer.  Have a dental exam and cleaning every 6 months.  Yearly exams  See your health care team every year to talk about:  Any changes in your health.  Any medicines your care team has prescribed.  Preventive care, family planning, and ways to prevent chronic diseases.  Shots (vaccines)   HPV shots (up to age 26), if you've never had them before.  Hepatitis B shots (up to age 59), if you've never had them before.  COVID-19 shot: Get this shot when it's due.  Flu shot: Get a flu shot every year.  Tetanus shot: Get a tetanus shot every 10 years.  Pneumococcal, hepatitis A, and RSV shots: Ask your care team if you need these based on your risk.  Shingles shot (for age 50 and up)  General health tests  Diabetes screening:  Starting at age 35, Get screened for diabetes at least every 3 years.  If you are younger than age 35, ask your care team if you should be screened for diabetes.  Cholesterol test: At age 39, start having a cholesterol test every 5 years, or more often if  advised.  Bone density scan (DEXA): At age 50, ask your care team if you should have this scan for osteoporosis (brittle bones).  Hepatitis C: Get tested at least once in your life.  STIs (sexually transmitted infections)  Before age 24: Ask your care team if you should be screened for STIs.  After age 24: Get screened for STIs if you're at risk. You are at risk for STIs (including HIV) if:  You are sexually active with more than one person.  You don't use condoms every time.  You or a partner was diagnosed with a sexually transmitted infection.  If you are at risk for HIV, ask about PrEP medicine to prevent HIV.  Get tested for HIV at least once in your life, whether you are at risk for HIV or not.  Cancer screening tests  Cervical cancer screening: If you have a cervix, begin getting regular cervical cancer screening tests starting at age 21.  Breast cancer scan (mammogram): If you've ever had breasts, begin having regular mammograms starting at age 40. This is a scan to check for breast cancer.  Colon cancer screening: It is important to start screening for colon cancer at age 45.  Have a colonoscopy test every 10 years (or more often if you're at risk) Or, ask your provider about stool tests like a FIT test every year or Cologuard test every 3 years.  To learn more about your testing options, visit:   .  For help making a decision, visit:   https://bit.ly/cf32212.  Prostate cancer screening test: If you have a prostate, ask your care team if a prostate cancer screening test (PSA) at age 55 is right for you.  Lung cancer screening: If you are a current or former smoker ages 50 to 80, ask your care team if ongoing lung cancer screenings are right for you.  For informational purposes only. Not to replace the advice of your health care provider. Copyright   2023 [employer]. All rights reserved. Clinically reviewed by the [facility]. [employer] 246639 - REV 01/24.  Learning  About Activities of Daily Living  What are activities of daily living?     Activities of daily living (ADLs) are the basic self-care tasks you do every day. These include eating, bathing, dressing, and moving around.  As you age, and if you have health problems, you may find that it's harder to do some of these tasks. If so, your doctor can suggest ideas that may help.  To measure what kind of help you may need, your doctor will ask how well you are able to do ADLs. Let your doctor know if there are any tasks that you are having trouble doing. This is an important first step to getting help. And when you have the help you need, you can stay as independent as possible.  How will a doctor assess your ADLs?  Asking about ADLs is part of a routine health checkup your doctor will likely do as you age. Your health check might be done in a doctor's office, in your home, or at a hospital. The goal is to find out if you are having any problems that could make it hard to care for yourself or that make it unsafe for you to be on your own.  To measure your ADLs, your doctor will ask how hard it is for you to do routine tasks. Your doctor may also want to know if you have changed the way you do a task because of a health problem. Your doctor may watch how you:  Walk back and forth.  Keep your balance while you stand or walk.  Move from sitting to standing or from a bed to a chair.  Button or unbutton a shirt or sweater.  Remove and put on your shoes.  It's common to feel a little worried or anxious if you find you can't do all the things you used to be able to do. Talking with your doctor about ADLs is a way to make sure you're as safe as possible and able to care for yourself as well as you can. You may want to bring a caregiver, friend, or family member to your checkup. They can help you talk to your doctor.  Follow-up care is a key part of your treatment and safety. Be sure to make and go to all appointments, and call your  doctor if you are having problems. It's also a good idea to know your test results and keep a list of the medicines you take.  Current as of: October 24, 2023  Content Version: 14.1    5981-5584 [employer].   Care instructions adapted under license by your healthcare professional. If you have questions about a medical condition or this instruction, always ask your healthcare professional. [employer] disclaims any warranty or liability for your use of this information.    Hearing Loss: Care Instructions  Overview     Hearing loss is a sudden or slow decrease in how well you hear. It can range from slight to profound. Permanent hearing loss can occur with aging. It also can happen when you are exposed long-term to loud noise. Examples include listening to loud music, riding motorcycles, or being around other loud machines.  Hearing loss can affect your work and home life. It can make you feel lonely or depressed. You may feel that you have lost your independence. But hearing aids and other devices can help you hear better and feel connected to others.  Follow-up care is a key part of your treatment and safety. Be sure to make and go to all appointments, and call your doctor if you are having problems. It's also a good idea to know your test results and keep a list of the medicines you take.  How can you care for yourself at home?  Avoid loud noises whenever possible. This helps keep your hearing from getting worse.  Always wear hearing protection around loud noises.  Wear a hearing aid as directed.  A professional can help you pick a hearing aid that will work best for you.  You can also get hearing aids over the counter for mild to moderate hearing loss.  Have hearing tests as your doctor suggests. They can show whether your hearing has changed. Your hearing aid may need to be adjusted.  Use other devices as needed. These may include:  Telephone amplifiers and hearing aids that can connect  "to a television, stereo, radio, or microphone.  Devices that use lights or vibrations. These alert you to the doorbell, a ringing telephone, or a baby monitor.  Television closed-captioning. This shows the words at the bottom of the screen. Most new TVs can do this.  TTY (text telephone). This lets you type messages back and forth on the telephone instead of talking or listening. These devices are also called TDD. When messages are typed on the keyboard, they are sent over the phone line to a receiving TTY. The message is shown on a monitor.  Use text messaging, social media, and email if it is hard for you to communicate by telephone.  Try to learn a listening technique called speechreading. It is not lipreading. You pay attention to people's gestures, expressions, posture, and tone of voice. These clues can help you understand what a person is saying. Face the person you are talking to, and have them face you. Make sure the lighting is good. You need to see the other person's face clearly.  Think about counseling if you need help to adjust to your hearing loss.  When should you call for help?  Watch closely for changes in your health, and be sure to contact your doctor if:    You think your hearing is getting worse.     You have new symptoms, such as dizziness or nausea.   Where can you learn more?  Go to https://www.[employer].net/patiented  Enter R798 in the search box to learn more about \"Hearing Loss: Care Instructions.\"  Current as of: September 27, 2023               Content Version: 14.0    9653-7809 [employer].   Care instructions adapted under license by your healthcare professional. If you have questions about a medical condition or this instruction, always ask your healthcare professional. [employer] disclaims any warranty or liability for your use of this information.      9 Ways to Cut Back on Drinking  Maybe you've found yourself drinking more alcohol than you'd prefer. If " "you want to cut back, here are some ideas to try.    Think before you drink.  Do you really want a drink, or is it just a habit? If you're used to having a drink at a certain time, try doing something else then.     Look for substitutes.  Find some no-alcohol drinks that you enjoy, like flavored seltzer water, tea with honey, or tonic with a slice of lime. Or try alcohol-free beer or \"virgin\" cocktails (without the alcohol).     Drink more water.  Use water to quench your thirst. Drink a glass of water before you have any alcohol. Have another glass along with every drink or between drinks.     Shrink your drink.  For example, have a bottle of beer instead of a pint. Use a smaller glass for wine. Choose drinks with lower alcohol content (ABV%). Or use less liquor and more mixer in cocktails.     Slow down.  It's easy to drink quickly and without thinking about it. Pay attention, and make each drink last longer.     Do the math.  Total up how much you spend on alcohol each month. How much is that a year? If you cut back, what could you do with the money you save?     Take a break.  Choose a day or two each week when you won't drink at all. Notice how you feel on those days, physically and emotionally. How did you sleep? Do you feel better? Over time, add more break days.     Count calories.  Would you like to lose some weight? For some people that's a good motivator for cutting back. Figure out how many calories are in each drink. How many does that add up to in a day? In a week? In a month?     Practice saying no.  Be ready when someone offers you a drink. Try: \"Thanks, I've had enough.\" Or \"Thanks, but I'm cutting back.\" Or \"No, thanks. I feel better when I drink less.\"   Current as of: November 15, 2023  Content Version: 14.1    7207-1993 [employer].   Care instructions adapted under license by your healthcare professional. If you have questions about a medical condition or this instruction, always " ask your healthcare professional. [employer] disclaims any warranty or liability for your use of this information.
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
## 2024-08-08 NOTE — RESULT ENCOUNTER NOTE
"Your cholesterol is abnormal, please use the recommendations below and recheck labs in 6-months.    Ways to improve your cholesterol...    1- Eats less saturated fats (including avoiding \"trans\" fats).    2 - Eat more unsaturated fats  - found in vege  tables, grains, and tree nuts.   Also by replacing butter with canola oil or olive oil.    3 - Eat more nuts.   1-2 ounces (a small handful) of almonds, walnuts, hazelnuts or pecans once a  day in place of other less healthy snacks.    4 - Eat more high   fiber foods - vegetables and whole grains including oat bran, oats, beans, peas, and flax seed.    5 - Eat more fish - such as salmon, tuna, mackerel, and sardines.  1 or 2 six ounce servings per week is a healthy replacement for other proteins.    6 - E  xercise for at least 120 minutes per week - which is equal to 30 minutes 4 days per week.    Jhon Abbott D.O.    "

## 2024-10-22 ENCOUNTER — ANCILLARY PROCEDURE (OUTPATIENT)
Dept: BONE DENSITY | Facility: CLINIC | Age: 64
End: 2024-10-22
Attending: FAMILY MEDICINE
Payer: COMMERCIAL

## 2024-10-22 DIAGNOSIS — Z78.0 POST-MENOPAUSAL: ICD-10-CM

## 2024-10-22 DIAGNOSIS — Z13.820 SCREENING FOR OSTEOPOROSIS: ICD-10-CM

## 2024-10-22 PROCEDURE — 77080 DXA BONE DENSITY AXIAL: CPT | Mod: TC | Performed by: PHYSICIAN ASSISTANT

## 2025-01-06 ENCOUNTER — OFFICE VISIT (OUTPATIENT)
Dept: PULMONOLOGY | Facility: CLINIC | Age: 65
End: 2025-01-06
Payer: COMMERCIAL

## 2025-01-06 VITALS
HEIGHT: 66 IN | DIASTOLIC BLOOD PRESSURE: 98 MMHG | HEART RATE: 64 BPM | BODY MASS INDEX: 20.96 KG/M2 | WEIGHT: 130.4 LBS | OXYGEN SATURATION: 99 % | SYSTOLIC BLOOD PRESSURE: 172 MMHG

## 2025-01-06 DIAGNOSIS — J47.9 BRONCHIECTASIS WITHOUT COMPLICATION (H): ICD-10-CM

## 2025-01-06 DIAGNOSIS — J47.9 BRONCHIECTASIS WITHOUT COMPLICATION (H): Primary | ICD-10-CM

## 2025-01-06 LAB
DLCOUNC-%PRED-PRE: 108 %
DLCOUNC-PRE: 21.89 ML/MIN/MMHG
DLCOUNC-PRED: 20.23 ML/MIN/MMHG
ERV-%PRED-PRE: 99 %
ERV-PRE: 0.83 L
ERV-PRED: 0.83 L
EXPTIME-PRE: 8.01 SEC
FEF2575-%PRED-PRE: 62 %
FEF2575-PRE: 1.24 L/SEC
FEF2575-PRED: 1.99 L/SEC
FEFMAX-%PRED-PRE: 77 %
FEFMAX-PRE: 4.8 L/SEC
FEFMAX-PRED: 6.21 L/SEC
FEV1-%PRED-PRE: 87 %
FEV1-PRE: 2.02 L
FEV1FEV6-PRE: 67 %
FEV1FEV6-PRED: 80 %
FEV1FVC-PRE: 67 %
FEV1FVC-PRED: 79 %
FEV1SVC-PRE: 70 %
FEV1SVC-PRED: 68 %
FIFMAX-PRE: 5.84 L/SEC
FVC-%PRED-PRE: 102 %
FVC-PRE: 3.01 L
FVC-PRED: 2.92 L
IC-%PRED-PRE: 83 %
IC-PRE: 2.08 L
IC-PRED: 2.47 L
VA-%PRED-PRE: 89 %
VA-PRE: 4.43 L
VC-%PRED-PRE: 86 %
VC-PRE: 2.9 L
VC-PRED: 3.37 L

## 2025-01-06 PROCEDURE — 94375 RESPIRATORY FLOW VOLUME LOOP: CPT | Performed by: INTERNAL MEDICINE

## 2025-01-06 PROCEDURE — 99213 OFFICE O/P EST LOW 20 MIN: CPT

## 2025-01-06 PROCEDURE — G0463 HOSPITAL OUTPT CLINIC VISIT: HCPCS

## 2025-01-06 PROCEDURE — 94729 DIFFUSING CAPACITY: CPT | Performed by: INTERNAL MEDICINE

## 2025-01-06 ASSESSMENT — PAIN SCALES - GENERAL: PAINLEVEL_OUTOF10: NO PAIN (0)
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
## 2025-01-06 NOTE — NURSING NOTE
Chief Complaint   Patient presents with    RECHECK     Return Bronchiectasis without complication    Medications reviewed and vital signs taken.   Vincent Gallardo, CMA

## 2025-01-15 ENCOUNTER — NURSE TRIAGE (OUTPATIENT)
Dept: FAMILY MEDICINE | Facility: CLINIC | Age: 65
End: 2025-01-15
Payer: COMMERCIAL

## 2025-01-15 NOTE — TELEPHONE ENCOUNTER
"Reason for Disposition    Systolic BP >= 160 OR Diastolic >= 100    Additional Information    Negative: Sounds like a life-threatening emergency to the triager    Negative: Symptom is main concern (e.g., headache, chest pain)    Negative: Low blood pressure is main concern    Negative: Pregnant 20 or more weeks (or postpartum < 6 weeks) with new hand or face swelling    Negative: Pregnant 20 or more weeks (or postpartum < 6 weeks) and Systolic BP >= 160 OR Diastolic >= 110    Negative: Systolic BP >= 160 OR Diastolic >= 100, and any cardiac (e.g., breathing difficulty, chest pain) or neurologic symptoms (e.g., new-onset blurred or double vision)    Negative: Patient sounds very sick or weak to the triager    Negative: Systolic BP >= 200 OR Diastolic >= 120 and having NO cardiac or neurologic symptoms    Negative: Pregnant 20 or more weeks (or postpartum < 6 weeks) with Systolic BP >= 140 OR Diastolic >= 90    Negative: Systolic BP >= 180 OR Diastolic >= 110, and missed most recent dose of blood pressure medication    Negative: Systolic BP >= 180 OR Diastolic >= 110    Negative: Patient wants to be seen    Negative: Ran out of BP medications    Negative: Taking BP medications and feels is having side effects (e.g., impotence, cough, dizziness)    Answer Assessment - Initial Assessment Questions  1. BLOOD PRESSURE: \"What is your blood pressure?\" \"Did you take at least two measurements 5 minutes apart?\"      160/98, 148/99  2. ONSET: \"When did you take your blood pressure?\"      This week  3. HOW: \"How did you take your blood pressure?\" (e.g., automatic home BP monitor, visiting nurse)      Automatic home BP  4. HISTORY: \"Do you have a history of high blood pressure?\"      yes  5. MEDICINES: \"Are you taking any medicines for blood pressure?\" \"Have you missed any doses recently?\"      Losartan 75 mg, no missed doses   6. OTHER SYMPTOMS: \"Do you have any symptoms?\" (e.g., blurred vision, chest pain, difficulty breathing, " "headache, weakness)      \"A lot of stress,\" vertigo earlier this week but none today  7. PREGNANCY: \"Is there any chance you are pregnant?\" \"When was your last menstrual period?\"    Protocols used: Blood Pressure - High-A-[place]    "

## 2025-01-16 ENCOUNTER — OFFICE VISIT (OUTPATIENT)
Dept: FAMILY MEDICINE | Facility: CLINIC | Age: 65
End: 2025-01-16
Payer: COMMERCIAL

## 2025-01-16 VITALS
TEMPERATURE: 98.5 F | OXYGEN SATURATION: 96 % | SYSTOLIC BLOOD PRESSURE: 150 MMHG | HEIGHT: 65 IN | BODY MASS INDEX: 21.13 KG/M2 | HEART RATE: 86 BPM | RESPIRATION RATE: 21 BRPM | WEIGHT: 126.8 LBS | DIASTOLIC BLOOD PRESSURE: 98 MMHG

## 2025-01-16 DIAGNOSIS — I10 HYPERTENSION GOAL BP (BLOOD PRESSURE) < 140/90: ICD-10-CM

## 2025-01-16 RX ORDER — LOSARTAN POTASSIUM 100 MG/1
100 TABLET ORAL DAILY
Qty: 90 TABLET | Refills: 3 | Status: SHIPPED | OUTPATIENT
Start: 2025-01-16

## 2025-01-16 NOTE — PROGRESS NOTES
"  Assessment & Plan     Hypertension goal BP (blood pressure) < 140/90  Increased Losartan to 100 mg a day  Discussed diet modification, continue with increasing physical activity.    Patient will monitor blood pressure at home.  If remain elevated she may call the clinic or send a message and possibly add hydrochlorothiazide to her medication.  Otherwise, she is scheduled to follow-up with her primary care physician on February 24, 2025    - losartan (COZAAR) 100 MG tablet; Take 1 tablet (100 mg) by mouth daily.      Miquel Sosa is a 64 year old, presenting for the following health issues:  Hypertension  Patient reports blood pressure has been on the high side, been averaging in the 150s to 140s systolic over 90s to 100.    She has no lower extremity edema, she has no headache no chest pain.  Weight remains stable.  She reports sometimes she feels dizzy and lightheaded she does have history of vertigo.    She is been under stress and traveling.          1/16/2025    10:09 AM   Additional Questions   Roomed by althea cunha ma   Accompanied by self         1/16/2025    10:09 AM   Patient Reported Additional Medications   Patient reports taking the following new medications none     History of Present Illness       Reason for visit:  Higher than normal blood pressure the last week and a half   She is taking medications regularly.     Readings has been over 140/90. She has also been dizzy since Wednesday. She has mentioned she is also under a lot of stress.        Review of Systems  Constitutional, HEENT, cardiovascular, pulmonary, GI, , musculoskeletal, neuro, skin, endocrine and psych systems are negative, except as otherwise noted.      Objective    BP (!) 176/90 (BP Location: Right arm, Patient Position: Sitting, Cuff Size: Adult Regular)   Pulse 86   Temp 98.5  F (36.9  C) (Oral)   Resp 21   Ht 1.647 m (5' 4.86\")   Wt 57.5 kg (126 lb 12.8 oz)   LMP 03/24/2006   SpO2 96%   BMI 21.19 kg/m    Body mass " index is 21.19 kg/m .  Physical Exam   GENERAL: alert and no distress  HENT: ear canals and TM's normal, nose and mouth without ulcers or lesions  NECK: no adenopathy, no asymmetry, masses, or scars  RESP: lungs clear to auscultation - no rales, rhonchi or wheezes  CV: regular rate and rhythm, normal S1 S2, no S3 or S4, no murmur, click or rub, no peripheral edema  ABDOMEN: soft, nontender, no hepatosplenomegaly, no masses and bowel sounds normal  MS: no gross musculoskeletal defects noted, no edema    Last Comprehensive Metabolic Panel:  Sodium   Date Value Ref Range Status   08/05/2024 139 135 - 145 mmol/L Final   05/18/2021 139 133 - 144 mmol/L Final     Potassium   Date Value Ref Range Status   08/05/2024 4.3 3.4 - 5.3 mmol/L Final   08/09/2022 3.8 3.4 - 5.3 mmol/L Final   05/18/2021 3.4 3.4 - 5.3 mmol/L Final     Chloride   Date Value Ref Range Status   08/05/2024 104 98 - 107 mmol/L Final   08/09/2022 108 94 - 109 mmol/L Final   05/18/2021 105 94 - 109 mmol/L Final     Carbon Dioxide   Date Value Ref Range Status   05/18/2021 29 20 - 32 mmol/L Final     Carbon Dioxide (CO2)   Date Value Ref Range Status   08/05/2024 27 22 - 29 mmol/L Final   08/09/2022 28 20 - 32 mmol/L Final     Anion Gap   Date Value Ref Range Status   08/05/2024 8 7 - 15 mmol/L Final   08/09/2022 4 3 - 14 mmol/L Final   05/18/2021 5 3 - 14 mmol/L Final     Glucose   Date Value Ref Range Status   08/05/2024 100 (H) 70 - 99 mg/dL Final   08/09/2022 171 (H) 70 - 99 mg/dL Final   05/18/2021 97 70 - 99 mg/dL Final     Urea Nitrogen   Date Value Ref Range Status   08/05/2024 10.7 8.0 - 23.0 mg/dL Final   08/09/2022 13 7 - 30 mg/dL Final   05/18/2021 11 7 - 30 mg/dL Final     Creatinine   Date Value Ref Range Status   08/05/2024 0.76 0.51 - 0.95 mg/dL Final   05/18/2021 0.81 0.52 - 1.04 mg/dL Final     GFR Estimate   Date Value Ref Range Status   08/05/2024 86 >60 mL/min/1.73m2 Final     Comment:     eGFR calculated using 2021 CKD-EPI equation.    05/18/2021 77 >60 mL/min/[1.73_m2] Final     Comment:     Non  GFR Calc  Starting 12/18/2018, serum creatinine based estimated GFR (eGFR) will be   calculated using the Chronic Kidney Disease Epidemiology Collaboration   (CKD-EPI) equation.       Calcium   Date Value Ref Range Status   08/05/2024 9.2 8.8 - 10.4 mg/dL Final     Comment:     Reference intervals for this test were updated on 7/16/2024 to reflect our healthy population more accurately. There may be differences in the flagging of prior results with similar values performed with this method. Those prior results can be interpreted in the context of the updated reference intervals.   05/18/2021 9.3 8.5 - 10.1 mg/dL Final     Bilirubin Total   Date Value Ref Range Status   08/05/2024 0.5 <=1.2 mg/dL Final   05/18/2021 0.6 0.2 - 1.3 mg/dL Final     Alkaline Phosphatase   Date Value Ref Range Status   08/05/2024 76 40 - 150 U/L Final   05/18/2021 99 40 - 150 U/L Final     ALT   Date Value Ref Range Status   08/05/2024 24 0 - 50 U/L Final   05/18/2021 41 0 - 50 U/L Final     AST   Date Value Ref Range Status   08/05/2024 22 0 - 45 U/L Final   05/18/2021 20 0 - 45 U/L Final                       Signed Electronically by: Govind Ross MD

## 2025-02-19 ASSESSMENT — ASTHMA QUESTIONNAIRES
QUESTION_1 LAST FOUR WEEKS HOW MUCH OF THE TIME DID YOUR ASTHMA KEEP YOU FROM GETTING AS MUCH DONE AT WORK, SCHOOL OR AT HOME: NONE OF THE TIME
QUESTION_2 LAST FOUR WEEKS HOW OFTEN HAVE YOU HAD SHORTNESS OF BREATH: ONCE OR TWICE A WEEK
ACT_TOTALSCORE: 22
QUESTION_3 LAST FOUR WEEKS HOW OFTEN DID YOUR ASTHMA SYMPTOMS (WHEEZING, COUGHING, SHORTNESS OF BREATH, CHEST TIGHTNESS OR PAIN) WAKE YOU UP AT NIGHT OR EARLIER THAN USUAL IN THE MORNING: ONCE OR TWICE
QUESTION_4 LAST FOUR WEEKS HOW OFTEN HAVE YOU USED YOUR RESCUE INHALER OR NEBULIZER MEDICATION (SUCH AS ALBUTEROL): NOT AT ALL
ACT_TOTALSCORE: 22
QUESTION_5 LAST FOUR WEEKS HOW WOULD YOU RATE YOUR ASTHMA CONTROL: WELL CONTROLLED

## 2025-02-24 ENCOUNTER — OFFICE VISIT (OUTPATIENT)
Dept: FAMILY MEDICINE | Facility: CLINIC | Age: 65
End: 2025-02-24
Payer: COMMERCIAL

## 2025-02-24 VITALS
HEART RATE: 70 BPM | BODY MASS INDEX: 19.26 KG/M2 | SYSTOLIC BLOOD PRESSURE: 148 MMHG | HEIGHT: 69 IN | TEMPERATURE: 98 F | OXYGEN SATURATION: 96 % | WEIGHT: 130 LBS | DIASTOLIC BLOOD PRESSURE: 90 MMHG | RESPIRATION RATE: 16 BRPM

## 2025-02-24 DIAGNOSIS — C25.1 MALIGNANT NEOPLASM OF BODY OF PANCREAS (H): ICD-10-CM

## 2025-02-24 DIAGNOSIS — F43.9 STRESS: ICD-10-CM

## 2025-02-24 DIAGNOSIS — Z82.0 FAMILY HISTORY OF ALZHEIMER DISEASE: ICD-10-CM

## 2025-02-24 DIAGNOSIS — R73.03 PREDIABETES: ICD-10-CM

## 2025-02-24 DIAGNOSIS — Z13.220 LIPID SCREENING: ICD-10-CM

## 2025-02-24 DIAGNOSIS — R41.3 MEMORY PROBLEM: ICD-10-CM

## 2025-02-24 DIAGNOSIS — Z63.6 CAREGIVER BURDEN: ICD-10-CM

## 2025-02-24 DIAGNOSIS — Z13.29 SCREENING FOR THYROID DISORDER: ICD-10-CM

## 2025-02-24 DIAGNOSIS — I10 HYPERTENSION GOAL BP (BLOOD PRESSURE) < 140/90: Primary | ICD-10-CM

## 2025-02-24 LAB
ALBUMIN SERPL BCG-MCNC: 4 G/DL (ref 3.5–5.2)
ALP SERPL-CCNC: 81 U/L (ref 40–150)
ALT SERPL W P-5'-P-CCNC: 34 U/L (ref 0–50)
ANION GAP SERPL CALCULATED.3IONS-SCNC: 10 MMOL/L (ref 7–15)
AST SERPL W P-5'-P-CCNC: 27 U/L (ref 0–45)
BASOPHILS # BLD AUTO: 0 10E3/UL (ref 0–0.2)
BASOPHILS NFR BLD AUTO: 1 %
BILIRUB SERPL-MCNC: 0.5 MG/DL
BUN SERPL-MCNC: 11.1 MG/DL (ref 8–23)
CALCIUM SERPL-MCNC: 9.2 MG/DL (ref 8.8–10.4)
CHLORIDE SERPL-SCNC: 105 MMOL/L (ref 98–107)
CHOLEST SERPL-MCNC: 225 MG/DL
CREAT SERPL-MCNC: 0.81 MG/DL (ref 0.51–0.95)
EGFRCR SERPLBLD CKD-EPI 2021: 80 ML/MIN/1.73M2
EOSINOPHIL # BLD AUTO: 0.1 10E3/UL (ref 0–0.7)
EOSINOPHIL NFR BLD AUTO: 2 %
ERYTHROCYTE [DISTWIDTH] IN BLOOD BY AUTOMATED COUNT: 12.2 % (ref 10–15)
EST. AVERAGE GLUCOSE BLD GHB EST-MCNC: 123 MG/DL
FASTING STATUS PATIENT QL REPORTED: YES
FASTING STATUS PATIENT QL REPORTED: YES
GLUCOSE SERPL-MCNC: 121 MG/DL (ref 70–99)
HBA1C MFR BLD: 5.9 % (ref 0–5.6)
HCO3 SERPL-SCNC: 26 MMOL/L (ref 22–29)
HCT VFR BLD AUTO: 38.7 % (ref 35–47)
HDLC SERPL-MCNC: 101 MG/DL
HGB BLD-MCNC: 13 G/DL (ref 11.7–15.7)
IMM GRANULOCYTES # BLD: 0 10E3/UL
IMM GRANULOCYTES NFR BLD: 0 %
LDLC SERPL CALC-MCNC: 102 MG/DL
LYMPHOCYTES # BLD AUTO: 1.3 10E3/UL (ref 0.8–5.3)
LYMPHOCYTES NFR BLD AUTO: 33 %
MCH RBC QN AUTO: 30 PG (ref 26.5–33)
MCHC RBC AUTO-ENTMCNC: 33.6 G/DL (ref 31.5–36.5)
MCV RBC AUTO: 89 FL (ref 78–100)
MONOCYTES # BLD AUTO: 0.3 10E3/UL (ref 0–1.3)
MONOCYTES NFR BLD AUTO: 8 %
NEUTROPHILS # BLD AUTO: 2.2 10E3/UL (ref 1.6–8.3)
NEUTROPHILS NFR BLD AUTO: 57 %
NONHDLC SERPL-MCNC: 124 MG/DL
PLATELET # BLD AUTO: 235 10E3/UL (ref 150–450)
POTASSIUM SERPL-SCNC: 4.1 MMOL/L (ref 3.4–5.3)
PROT SERPL-MCNC: 6.7 G/DL (ref 6.4–8.3)
RBC # BLD AUTO: 4.34 10E6/UL (ref 3.8–5.2)
SODIUM SERPL-SCNC: 141 MMOL/L (ref 135–145)
TRIGL SERPL-MCNC: 110 MG/DL
TSH SERPL DL<=0.005 MIU/L-ACNC: 1.65 UIU/ML (ref 0.3–4.2)
VIT B12 SERPL-MCNC: 427 PG/ML (ref 232–1245)
WBC # BLD AUTO: 3.9 10E3/UL (ref 4–11)

## 2025-02-24 PROCEDURE — 83036 HEMOGLOBIN GLYCOSYLATED A1C: CPT | Performed by: FAMILY MEDICINE

## 2025-02-24 PROCEDURE — 83690 ASSAY OF LIPASE: CPT | Performed by: FAMILY MEDICINE

## 2025-02-24 PROCEDURE — G2211 COMPLEX E/M VISIT ADD ON: HCPCS | Performed by: FAMILY MEDICINE

## 2025-02-24 PROCEDURE — 99215 OFFICE O/P EST HI 40 MIN: CPT | Performed by: FAMILY MEDICINE

## 2025-02-24 PROCEDURE — 80053 COMPREHEN METABOLIC PANEL: CPT | Performed by: FAMILY MEDICINE

## 2025-02-24 PROCEDURE — 80061 LIPID PANEL: CPT | Performed by: FAMILY MEDICINE

## 2025-02-24 PROCEDURE — 85025 COMPLETE CBC W/AUTO DIFF WBC: CPT | Performed by: FAMILY MEDICINE

## 2025-02-24 PROCEDURE — 36415 COLL VENOUS BLD VENIPUNCTURE: CPT | Performed by: FAMILY MEDICINE

## 2025-02-24 PROCEDURE — 82607 VITAMIN B-12: CPT | Performed by: FAMILY MEDICINE

## 2025-02-24 PROCEDURE — 84443 ASSAY THYROID STIM HORMONE: CPT | Performed by: FAMILY MEDICINE

## 2025-02-24 RX ORDER — METOPROLOL SUCCINATE 25 MG/1
25 TABLET, EXTENDED RELEASE ORAL DAILY
Qty: 90 TABLET | Refills: 0 | Status: SHIPPED | OUTPATIENT
Start: 2025-02-24

## 2025-02-24 SDOH — SOCIAL STABILITY - SOCIAL INSECURITY: DEPENDENT RELATIVE NEEDING CARE AT HOME: Z63.6

## 2025-02-24 ASSESSMENT — PAIN SCALES - GENERAL: PAINLEVEL_OUTOF10: NO PAIN (0)

## 2025-02-24 NOTE — PATIENT INSTRUCTIONS
Add metoprolol with your current losartan  Check bp at home  Follow up in 6-8 weeks for follow up   As discussed neuropsychic testing advised  Follow up with genetic testing as well   Take care  Jhon Abbott D.O.          Patient Education      [facility] Delivery Note  Date of Admission: 2023  8:58 PM    Primary Provider:  ACNM  Admitting Attending:  Maranda Snow MD  Date of delivery: 2023   CNM at delivery: Samara Guido   Attending MD at time of Delivery: Ann-Marie Marie MD      Cee Schroeder is now a , BRADEN 2023, by Last Menstrual Period. Pregnancy significant for GDM A1, AMA, obesity, history of pre-eclampsia in G3 and possible shoulder dystocia in G2 without injury, tobacco abuse and GBS positive status.     Stage I: Cee Schroeder was admitted for induction at 2 cm. Her induction was started with pitocin and she received continuous monitoring. During her labor, Cee met criteria for preeclampsia without severe features. Her blood pressures remained stable and she did not require anti-hypertensive medications. She spontaneously ruptured at 2023  3:10 AM , clear fluid and progressed to complete at 1025.         Stage II:  of a viable male infant occurred at  10:43 AM , FRANCIS over an intact perineum. There was a loose nuchal, reduced via somersault maneuver. There were no difficulties delivering the anterior or posterior shoulder. The infant was vigorous and placed on the maternal abdomen. The cord was clamped and cut after cessation of pulsation.       Stage III:   Placenta and Cord:    Placenta:    Date  23   Time  1052   Removal  Spontaneous    Appearance Intact    Comment  3 vessel cord       The placenta delivered intact via Schultze presentation with maternal pushing effort. Pitocin was administered for third stage management. Fundus firm, midline, at umbilicus with light lochia.    Lacerations:none    QBL:  700 mL    Specimens: Cord blood sent to lab.  pH:  N/A    Complications: none           Condition: stable, transfer to postpartum.     Infant Information:     Weight:   Information for the patient's :  True Schroeder [[id number]]   7 lb 5.1 oz (3320 g)     Feeding: both  breast and bottle Breastfeeding Status: Yes (04/13/23 1700)  Desires circumcision: Yes    APGARS:    1Min:  8   5Min: 9   10Min:      Condition: stable        Review the Delivery Report for Details     Samara Guido, KEIRYM

## 2025-02-24 NOTE — PROGRESS NOTES
Assessment & Plan     Hypertension goal BP (blood pressure) < 140/90  Elevated , patient thinks it is due to anxiety but ramos snot want anxiety meds, she is taking care of her parents and is stress is getting worse.  Advised starting BB, risks and benefits reviewed,  there is risk that it may make her breathing worse, she would like to try it , if not working will use low dose norvasc.  Follow up in 6 weeks  Check labs today   - Comprehensive metabolic panel (BMP + Alb, Alk Phos, ALT, AST, Total. Bili, TP); Future  - CBC with platelets and differential; Future  - metoprolol succinate ER (TOPROL XL) 25 MG 24 hr tablet; Take 1 tablet (25 mg) by mouth daily.  - Comprehensive metabolic panel (BMP + Alb, Alk Phos, ALT, AST, Total. Bili, TP)  - CBC with platelets and differential    Stress  Declined therapy , or meds for anxiety, she mentioned problems focusing and memory issues today , which is most likley due to mental health but due to her family history , advised neuro psych testing    - Adult Mental Health  Referral; Future    Caregiver burden  As above, advised talking to family who can assist with parents  - Adult Mental Health  Referral; Future    Family history of Alzheimer disease  Referred to genetic counseling as both her parents have alzheimer, and get  baseline neuro psych testing   - Adult Mental Health  Referral; Future  - Adult Genetics & Metabolism  Referral; Future    Malignant neoplasm of body of pancreas (H)  She has been followed by oncology over 10 years, and was told no cristy to continue to follow up with them or do scans.  She is to monitor symptoms. She denies any new issues  - Lipase; Future  - Lipase    Lipid screening    - Lipid panel reflex to direct LDL Fasting; Future  - Lipid panel reflex to direct LDL Fasting    Screening for thyroid disorder    - TSH with free T4 reflex; Future  - TSH with free T4 reflex    Memory problem  As above  - Adult Mental  Health  Referral; Future  - Adult Genetics & Metabolism  Referral; Future  - Vitamin B12; Future  - Treponema Abs w Reflex to RPR and Titer; Future  - Vitamin B12    Prediabetes  Stable, Diet and lifestyle changes   - Hemoglobin A1c; Future  - Hemoglobin A1c        Results for orders placed or performed in visit on 02/24/25   CBC with platelets and differential     Status: Abnormal   Result Value Ref Range    WBC Count 3.9 (L) 4.0 - 11.0 10e3/uL    RBC Count 4.34 3.80 - 5.20 10e6/uL    Hemoglobin 13.0 11.7 - 15.7 g/dL    Hematocrit 38.7 35.0 - 47.0 %    MCV 89 78 - 100 fL    MCH 30.0 26.5 - 33.0 pg    MCHC 33.6 31.5 - 36.5 g/dL    RDW 12.2 10.0 - 15.0 %    Platelet Count 235 150 - 450 10e3/uL    % Neutrophils 57 %    % Lymphocytes 33 %    % Monocytes 8 %    % Eosinophils 2 %    % Basophils 1 %    % Immature Granulocytes 0 %    Absolute Neutrophils 2.2 1.6 - 8.3 10e3/uL    Absolute Lymphocytes 1.3 0.8 - 5.3 10e3/uL    Absolute Monocytes 0.3 0.0 - 1.3 10e3/uL    Absolute Eosinophils 0.1 0.0 - 0.7 10e3/uL    Absolute Basophils 0.0 0.0 - 0.2 10e3/uL    Absolute Immature Granulocytes 0.0 <=0.4 10e3/uL   Hemoglobin A1c     Status: Abnormal   Result Value Ref Range    Estimated Average Glucose 123 (H) <117 mg/dL    Hemoglobin A1C 5.9 (H) 0.0 - 5.6 %   CBC with platelets and differential     Status: Abnormal    Narrative    The following orders were created for panel order CBC with platelets and differential.  Procedure                               Abnormality         Status                     ---------                               -----------         ------                     CBC with platelets and d...[[id number]]  Abnormal            Final result                 Please view results for these tests on the individual orders.       Spent  45min greater than 50% of counseling and coordination of care for the conditions documented above.  The longitudinal plan of care for the diagnosis(es)/condition(s) as  documented were addressed during this visit. Due to the added complexity in care, I will continue to support Sheila in the subsequent management and with ongoing continuity of care.      See Patient Instructions    Miquel Sosa is a 64 year old, presenting for the following health issues:  Chronic Disease Management      2/24/2025     7:46 AM   Additional Questions   Roomed by Maude Ramos os stress taking care of her parents.  Her mom lives out of state and is worsenig with her dementia  Feels like she is overwhelmed and anxious but ramos snot want meds to help with that or do therapy    Maternalgrandmother probably at age 84-, mom(70's) with alzheimer     Saw Dr. Ross in January and Blood pressure medication dose changed. Results still trending a little elevated. Slightly improved.         History of Present Illness       Diabetes:   She presents for follow up of diabetes.    She is not checking blood glucose.         She has no concerns regarding her diabetes at this time.   She is not experiencing numbness or burning in feet, excessive thirst, blurry vision, weight changes or redness, sores or blisters on feet.           Hypertension: She presents for follow up of hypertension.  She does check blood pressure  regularly outside of the clinic. Outside blood pressures have been over 140/90. She follows a low salt diet.     She eats 2-3 servings of fruits and vegetables daily.She consumes 0 sweetened beverage(s) daily.She exercises with enough effort to increase her heart rate 9 or less minutes per day.  She exercises with enough effort to increase her heart rate 3 or less days per week.   She is taking medications regularly.         Review of Systems  Constitutional, HEENT, cardiovascular, pulmonary, GI, , musculoskeletal, neuro, skin, endocrine and psych systems are negative, except as otherwise noted.      Objective    LMP 03/24/2006   There is no height or weight on file to calculate BMI.  Physical Exam    GENERAL: alert and no distress  EYES: Eyes grossly normal to inspection, PERRL and conjunctivae and sclerae normal  HENT: ear canals and TM's normal, nose and mouth without ulcers or lesions  NECK: no adenopathy, no asymmetry, masses, or scars  RESP: lungs clear to auscultation - no rales, rhonchi or wheezes  CV: regular rate and rhythm, normal S1 S2, no S3 or S4, no murmur, click or rub, no peripheral edema  ABDOMEN: soft, nontender, no hepatosplenomegaly, no masses and bowel sounds normal  MS: no gross musculoskeletal defects noted, no edema  SKIN: no suspicious lesions or rashes  NEURO: Normal strength and tone, mentation intact and speech normal  PSYCH: mentation appears normal, affect normal/bright    Results for orders placed or performed in visit on 02/24/25   CBC with platelets and differential     Status: Abnormal   Result Value Ref Range    WBC Count 3.9 (L) 4.0 - 11.0 10e3/uL    RBC Count 4.34 3.80 - 5.20 10e6/uL    Hemoglobin 13.0 11.7 - 15.7 g/dL    Hematocrit 38.7 35.0 - 47.0 %    MCV 89 78 - 100 fL    MCH 30.0 26.5 - 33.0 pg    MCHC 33.6 31.5 - 36.5 g/dL    RDW 12.2 10.0 - 15.0 %    Platelet Count 235 150 - 450 10e3/uL    % Neutrophils 57 %    % Lymphocytes 33 %    % Monocytes 8 %    % Eosinophils 2 %    % Basophils 1 %    % Immature Granulocytes 0 %    Absolute Neutrophils 2.2 1.6 - 8.3 10e3/uL    Absolute Lymphocytes 1.3 0.8 - 5.3 10e3/uL    Absolute Monocytes 0.3 0.0 - 1.3 10e3/uL    Absolute Eosinophils 0.1 0.0 - 0.7 10e3/uL    Absolute Basophils 0.0 0.0 - 0.2 10e3/uL    Absolute Immature Granulocytes 0.0 <=0.4 10e3/uL   Hemoglobin A1c     Status: Abnormal   Result Value Ref Range    Estimated Average Glucose 123 (H) <117 mg/dL    Hemoglobin A1C 5.9 (H) 0.0 - 5.6 %   CBC with platelets and differential     Status: Abnormal    Narrative    The following orders were created for panel order CBC with platelets and differential.  Procedure                               Abnormality         Status                      ---------                               -----------         ------                     CBC with platelets and d...[[id number]]  Abnormal            Final result                 Please view results for these tests on the individual orders.             Signed Electronically by: Jhon Abbott DO

## 2025-02-25 LAB — LIPASE SERPL-CCNC: 36 U/L (ref 13–60)

## 2025-02-26 NOTE — RESULT ENCOUNTER NOTE
All your results are stable,  Please contact me if you have any questions.  Take care,  Jhon Abbott D.O.

## 2025-04-29 ENCOUNTER — OFFICE VISIT (OUTPATIENT)
Dept: FAMILY MEDICINE | Facility: CLINIC | Age: 65
End: 2025-04-29
Payer: COMMERCIAL

## 2025-04-29 VITALS
WEIGHT: 127 LBS | OXYGEN SATURATION: 100 % | DIASTOLIC BLOOD PRESSURE: 84 MMHG | RESPIRATION RATE: 16 BRPM | HEART RATE: 70 BPM | TEMPERATURE: 98 F | SYSTOLIC BLOOD PRESSURE: 144 MMHG | BODY MASS INDEX: 18.81 KG/M2 | HEIGHT: 69 IN

## 2025-04-29 DIAGNOSIS — C25.1 MALIGNANT NEOPLASM OF BODY OF PANCREAS (H): ICD-10-CM

## 2025-04-29 DIAGNOSIS — F43.9 STRESS: ICD-10-CM

## 2025-04-29 DIAGNOSIS — Z23 NEED FOR VACCINATION: ICD-10-CM

## 2025-04-29 DIAGNOSIS — I10 HYPERTENSION GOAL BP (BLOOD PRESSURE) < 140/90: Primary | ICD-10-CM

## 2025-04-29 PROCEDURE — G2211 COMPLEX E/M VISIT ADD ON: HCPCS | Performed by: FAMILY MEDICINE

## 2025-04-29 PROCEDURE — 3077F SYST BP >= 140 MM HG: CPT | Performed by: FAMILY MEDICINE

## 2025-04-29 PROCEDURE — 3079F DIAST BP 80-89 MM HG: CPT | Performed by: FAMILY MEDICINE

## 2025-04-29 PROCEDURE — 99214 OFFICE O/P EST MOD 30 MIN: CPT | Performed by: FAMILY MEDICINE

## 2025-04-29 PROCEDURE — 1126F AMNT PAIN NOTED NONE PRSNT: CPT | Performed by: FAMILY MEDICINE

## 2025-04-29 RX ORDER — METOPROLOL SUCCINATE 25 MG/1
25 TABLET, EXTENDED RELEASE ORAL DAILY
Qty: 90 TABLET | Refills: 0 | Status: CANCELLED | OUTPATIENT
Start: 2025-04-29

## 2025-04-29 RX ORDER — AMLODIPINE BESYLATE 5 MG/1
5 TABLET ORAL DAILY
Qty: 90 TABLET | Refills: 1 | Status: SHIPPED | OUTPATIENT
Start: 2025-04-29

## 2025-04-29 ASSESSMENT — PAIN SCALES - GENERAL: PAINLEVEL_OUTOF10: NO PAIN (0)

## 2025-04-29 NOTE — PATIENT INSTRUCTIONS
Start norvasc 1/2 tab along with losartan  Increase upto 2 tabs   Send bp readings in 3 weeks  Stop by our pharmacy to check bp as planned  Jhon Abbott D.O.          Patient Education

## 2025-04-29 NOTE — PROGRESS NOTES
Assessment & Plan     Hypertension goal BP (blood pressure) < 140/90  Still elevated, did not tolerate BB, she is thinking it is still because of the stress with her mom's care.  No chest pain, no sob, no dizziness.  Advised starting low dose norvasc, check bp here or at the pharmacy in few weeks to document improvement.    - amLODIPine (NORVASC) 5 MG tablet; Take 1 tablet (5 mg) by mouth daily.    Stress  Declined any mental health med.  She is going through a lot of stress dealing with her mothers care, she has dementia and there is family tension moving her to assisted living.  Close monitoring , advised therapy    Malignant neoplasm of body of pancreas (H)  Sees oncology     Need for vaccination  declined      The longitudinal plan of care for the diagnosis(es)/condition(s) as documented were addressed during this visit. Due to the added complexity in care, I will continue to support Sheila in the subsequent management and with ongoing continuity of care.           Miquel Sosa is a 64 year old, presenting for the following health issues:  Hypertension        4/29/2025     9:30 AM   Additional Questions   Roomed by Maude     Felt that the medication affected her mental health.   She didn't want to get out of bed.   Took the new medication for a while. Stopped. Blood pressure was not better either.   Headaches may have been weather related or blood pressure. She is unsure.     History of Present Illness       Diabetes:   She presents for follow up of diabetes.    She is not checking blood glucose.         She has no concerns regarding her diabetes at this time.   She is not experiencing numbness or burning in feet, excessive thirst, blurry vision, weight changes or redness, sores or blisters on feet.           Hypertension: She presents for follow up of hypertension.  She does check blood pressure  regularly outside of the clinic. Outside blood pressures have been over 140/90. She follows a low salt diet.  "    She eats 2-3 servings of fruits and vegetables daily.She consumes 0 sweetened beverage(s) daily.She exercises with enough effort to increase her heart rate 9 or less minutes per day.  She exercises with enough effort to increase her heart rate 3 or less days per week.   She is taking medications regularly.           Review of Systems  Constitutional, HEENT, cardiovascular, pulmonary, GI, , musculoskeletal, neuro, skin, endocrine and psych systems are negative, except as otherwise noted.      Objective    BP (!) 144/84   Pulse 70   Temp 98  F (36.7  C) (Oral)   Resp 16   Ht 1.749 m (5' 8.86\")   Wt 57.6 kg (127 lb)   LMP 03/24/2006   SpO2 100%   BMI 18.83 kg/m    Body mass index is 18.83 kg/m .  Physical Exam   GENERAL: alert and no distress  NECK: no adenopathy, no asymmetry, masses, or scars  RESP: lungs clear to auscultation - no rales, rhonchi or wheezes  CV: regular rate and rhythm, normal S1 S2, no S3 or S4, no murmur, click or rub, no peripheral edema  ABDOMEN: soft, nontender, no hepatosplenomegaly, no masses and bowel sounds normal  MS: no gross musculoskeletal defects noted, no edema            Signed Electronically by: Jhon Abbott DO    "

## 2025-05-08 NOTE — TELEPHONE ENCOUNTER
DIAGNOSIS: trouble bending fingers, arm pain after fall, self referred, no images, [facility]     APPOINTMENT DATE: 5/9/25   NOTES STATUS DETAILS   MEDICATION LIST Internal

## 2025-05-09 ENCOUNTER — ANCILLARY PROCEDURE (OUTPATIENT)
Dept: GENERAL RADIOLOGY | Facility: CLINIC | Age: 65
End: 2025-05-09
Attending: FAMILY MEDICINE
Payer: COMMERCIAL

## 2025-05-09 ENCOUNTER — PRE VISIT (OUTPATIENT)
Dept: ORTHOPEDICS | Facility: CLINIC | Age: 65
End: 2025-05-09

## 2025-05-09 DIAGNOSIS — M79.641 RIGHT HAND PAIN: ICD-10-CM

## 2025-05-09 DIAGNOSIS — M25.531 RIGHT WRIST PAIN: ICD-10-CM

## 2025-05-09 PROCEDURE — 86780 TREPONEMA PALLIDUM: CPT | Performed by: FAMILY MEDICINE

## 2025-05-09 PROCEDURE — 99000 SPECIMEN HANDLING OFFICE-LAB: CPT | Performed by: PATHOLOGY

## 2025-05-09 PROCEDURE — 73130 X-RAY EXAM OF HAND: CPT | Mod: RT | Performed by: RADIOLOGY

## 2025-05-09 PROCEDURE — 73110 X-RAY EXAM OF WRIST: CPT | Mod: RT | Performed by: RADIOLOGY

## 2025-05-12 ENCOUNTER — RESULTS FOLLOW-UP (OUTPATIENT)
Dept: FAMILY MEDICINE | Facility: CLINIC | Age: 65
End: 2025-05-12

## 2025-05-13 ENCOUNTER — MYC MEDICAL ADVICE (OUTPATIENT)
Dept: FAMILY MEDICINE | Facility: CLINIC | Age: 65
End: 2025-05-13
Payer: COMMERCIAL

## 2025-05-14 ENCOUNTER — VIRTUAL VISIT (OUTPATIENT)
Dept: FAMILY MEDICINE | Facility: CLINIC | Age: 65
End: 2025-05-14
Payer: COMMERCIAL

## 2025-05-14 DIAGNOSIS — I10 ESSENTIAL (PRIMARY) HYPERTENSION: Primary | ICD-10-CM

## 2025-05-14 DIAGNOSIS — F43.9 STRESS: ICD-10-CM

## 2025-05-14 DIAGNOSIS — I10 HYPERTENSION GOAL BP (BLOOD PRESSURE) < 140/90: ICD-10-CM

## 2025-05-14 PROCEDURE — 98005 SYNCH AUDIO-VIDEO EST LOW 20: CPT | Performed by: FAMILY MEDICINE

## 2025-05-14 RX ORDER — AMLODIPINE BESYLATE 5 MG/1
5 TABLET ORAL DAILY
Qty: 90 TABLET | Refills: 1 | Status: SHIPPED | OUTPATIENT
Start: 2025-05-14

## 2025-05-14 NOTE — TELEPHONE ENCOUNTER
There are few that are high, would she be able to do quick telephone visit anytime tday to talk about med adjustment today    Jhon Abbott D.O.

## 2025-05-14 NOTE — PROGRESS NOTES
Sheila is a 64 year old who is being evaluated via a billable video visit.    How would you like to obtain your AVS? MyChart  If the video visit is dropped, the invitation should be resent by: Text to cell phone: [phone]  Will anyone else be joining your video visit? No      Assessment & Plan     Essential (primary) hypertension  Elevated readings, she did not tolerate BB in the past, she is now on 2.5 mg of norvasc and losartan 100mg.  Advised increasing norvasc to 5 mg, check bp and if still elevated in few weeks going up to 7.5mg of norvasc and titrate upto 10mg if needed.  Send message back in 1-2 months with progress.  Follow up for annual as planned     Stress  Her mother is in facility and out of state, she is managing all her house and estate and there is some conflict with syNvelopeding and is hard to deal with .   She has declined any mental health med or therapy for now.  Close monitoring             Follow-up       Subjective   Sheila is a 64 year old, presenting for the following health issues:  Hypertension      5/14/2025     2:07 PM   Additional Questions   Roomed by justen     Video Start Time: 2:15 PM    History of Present Illness       Reason for visit:  Blood pressure       Home bp     161/98, 159/92, 136/92, 133/80, 125/86, 138/97, 133/85, 154/97, 142/92, 135/91, 131/82, 133/96, 155/89, 138/86, 127/97, 136/92, 143/87     Lots of things going on in her life  1/2 of 5 mg Norvasc in the am , losartan at night         Review of Systems  Constitutional, HEENT, cardiovascular, pulmonary, GI, , musculoskeletal, neuro, skin, endocrine and psych systems are negative, except as otherwise noted.      Objective           Vitals:  No vitals were obtained today due to virtual visit.    Physical Exam   GENERAL: alert and no distress  EYES: Eyes grossly normal to inspection.  No discharge or erythema, or obvious scleral/conjunctival abnormalities.  RESP: No audible wheeze, cough, or visible cyanosis.    SKIN:  Visible skin clear. No significant rash, abnormal pigmentation or lesions.  NEURO: Cranial nerves grossly intact.  Mentation and speech appropriate for age.  PSYCH: Appropriate affect, tone, and pace of words          Video-Visit Details    Type of service:  Video Visit   Video End Time:2:28PM  Originating Location (pt. Location): Home    Distant Location (provider location):  On-site  Platform used for Video Visit: Nina  Signed Electronically by: Jhon Abbott DO

## 2025-06-04 ENCOUNTER — ANCILLARY PROCEDURE (OUTPATIENT)
Dept: MRI IMAGING | Facility: CLINIC | Age: 65
End: 2025-06-04
Payer: COMMERCIAL

## 2025-06-04 DIAGNOSIS — M79.89 PAIN AND SWELLING OF RIGHT FOREARM: ICD-10-CM

## 2025-06-04 DIAGNOSIS — M25.531 RIGHT WRIST PAIN: ICD-10-CM

## 2025-06-04 DIAGNOSIS — M79.631 PAIN AND SWELLING OF RIGHT FOREARM: ICD-10-CM

## 2025-06-04 DIAGNOSIS — M77.8 TENDINITIS OF FLEXOR TENDON OF RIGHT HAND: ICD-10-CM

## 2025-06-04 PROCEDURE — 73221 MRI JOINT UPR EXTREM W/O DYE: CPT | Mod: RT | Performed by: RADIOLOGY

## 2025-06-17 ENCOUNTER — MYC MEDICAL ADVICE (OUTPATIENT)
Dept: FAMILY MEDICINE | Facility: CLINIC | Age: 65
End: 2025-06-17
Payer: COMMERCIAL

## 2025-06-17 NOTE — TELEPHONE ENCOUNTER
Routing GenieBelthart message to PCP    See BP readings      Jumana RN    Triage Nurse  [facility]

## 2025-06-25 SDOH — HEALTH STABILITY: PHYSICAL HEALTH: ON AVERAGE, HOW MANY DAYS PER WEEK DO YOU ENGAGE IN MODERATE TO STRENUOUS EXERCISE (LIKE A BRISK WALK)?: 3 DAYS

## 2025-06-25 SDOH — HEALTH STABILITY: PHYSICAL HEALTH: ON AVERAGE, HOW MANY MINUTES DO YOU ENGAGE IN EXERCISE AT THIS LEVEL?: 20 MIN

## 2025-06-26 ENCOUNTER — OFFICE VISIT (OUTPATIENT)
Dept: ORTHOPEDICS | Facility: CLINIC | Age: 65
End: 2025-06-26
Payer: COMMERCIAL

## 2025-06-26 DIAGNOSIS — M79.89 PAIN AND SWELLING OF RIGHT FOREARM: ICD-10-CM

## 2025-06-26 DIAGNOSIS — M79.631 PAIN AND SWELLING OF RIGHT FOREARM: ICD-10-CM

## 2025-06-26 DIAGNOSIS — M25.531 RIGHT WRIST PAIN: Primary | ICD-10-CM

## 2025-06-26 DIAGNOSIS — M25.831 ULNAR ABUTMENT SYNDROME OF RIGHT WRIST: ICD-10-CM

## 2025-06-26 PROCEDURE — 99213 OFFICE O/P EST LOW 20 MIN: CPT | Mod: GC | Performed by: FAMILY MEDICINE

## 2025-06-26 NOTE — LETTER
6/26/2025      RE: Kiersten Massey  [address] [facility] [address]     Dear Colleague,    Thank you for referring your patient, Kiersten Massey, to the [facility] [place]. Please see a copy of my visit note below.    [facility]  Sports Medicine Clinic  Clinics and Surgery Center         SUBJECTIVE       Sheila Massey is a 64 year old female presenting to clinic today for follow-up of right hand/wrist pain.     Last seen on 5/22/2025:  - Ordered a right wrist MRI for further evaluation of flexor tendon tenosynovitis, ligamentous injury, or occult fracture.  - Discussed continued symptom management with icing, bracing, and topical Voltaren gel instead of Mobic as she prefers not to take oral medications.    Since last visit:   - Patient reports feeling better due to a decrease in pain, she notes she has not been using the Voltaren gel or ice lately. She notes stiffness and swelling in the mornings and notes pain along the distal and dorsal aspect of the wrist and thenar eminence.   - She completed an MRI on 6/4 and would like to discuss results and next steps.      PMH, Medications, and Allergies were reviewed and updated as needed.    ROS:  As noted above, otherwise negative.    Patient Active Problem List   Diagnosis     CARDIOVASCULAR SCREENING; LDL GOAL LESS THAN 160     Low back pain     Hypertension goal BP (blood pressure) < 140/90     Malignant neoplasm of body of pancreas (H)     Essential hypertension with goal blood pressure less than 140/90     Pulmonary infiltrate     Moderate persistent asthma, unspecified whether complicated     Bronchiectasis without complication (H)     Prediabetes       Current Outpatient Medications   Medication Sig Dispense Refill     albuterol (PROAIR HFA/PROVENTIL HFA/VENTOLIN HFA) 108 (90 Base) MCG/ACT inhaler Inhale 2 puffs into the lungs every 6 hours as needed for shortness of breath, wheezing or cough 18 g 3      amLODIPine (NORVASC) 5 MG tablet Take 1 tablet (5 mg) by mouth daily. 90 tablet 1     estradiol (ESTRACE) 0.1 MG/GM vaginal cream Place 2 g vaginally twice a week 42.5 g 3     losartan (COZAAR) 100 MG tablet Take 1 tablet (100 mg) by mouth daily. 90 tablet 3     meloxicam (MOBIC) 15 MG tablet Take 1 tablet (15 mg) by mouth daily. 14 tablet 0     Multiple Vitamins-Minerals (MULTIVITAMIN) LIQD        salmeterol (SEREVENT) 50 MCG/ACT inhaler Inhale 1 puff into the lungs 2 times daily 1 each 11     UNABLE TO FIND MEDICATION NAME: OPC-3              OBJECTIVE:       Vitals: There were no vitals filed for this visit.  BMI: There is no height or weight on file to calculate BMI.    Gen: Alert and in no acute distress.  Pulm: Breathing comfortably on room air, no increased respiratory effort.  Psych: Euthymic, appropriately answers questions.    MSK:   RIGHT WRIST  Inspection:    No significant swelling along the anterior forearm.  Palpation:    Mildly tender diffusely around the wrist (especially palmar aspect).  Range of Motion:    Slightly decreased ROM of wrist and hand due to pain/stiffness. Can fully flex fingers with some discomfort. Can fully extend fingers without much discomfort.      Exam: MRI of the right wrist dated 6/4/2025.  COMPARISON: Radiograph dated 5/9/2025.  CLINICAL HISTORY: Wrist pain and swelling. Recent fall.  TECHNIQUE: Multiplanar, multisequence MR imaging of the right wrist  was obtained using standard sequences in 3 orthogonal planes without  the use of intravenous or intra-articular gadolinium contrast.  FINDINGS:     Small amount of fluid in the distal radioulnar joint and radiocarpal  joint, physiologic.     No marrow signal abnormalities to suggest fracture or marrow  infiltration. Bone marrow edema and cystic changes in the region of  the distal ulna/ulnar styloid. Cystic changes within the scaphoid,  lunate, and triquetrum, greatest in the lunate, with subchondral bone  irregularity in  the lunate, likely due to ulnar positive variance.  There is adjacent high-grade to full-thickness tearing of the central  portion of the triangular fibrocartilage complex.     The scapholunate and lunotriquetral ligaments are grossly intact on  this non arthrographic study.     The flexor tendons are intact. The visualized median and ulnar nerves  are unremarkable. The visualized extensor tendons are intact.     Mild soft tissue edema adjacent to the posterior capsule along the  posterior aspect of the scaphoid deep to the extensor tendons of the  second extensor compartment.                                                                      IMPRESSION:  1. No marrow signal abnormalities in the right wrist to suggest  fracture or marrow infiltration.     2. Intraosseous cystic changes in the scaphoid, lunate, and  triquetrum, greatest within the lunate. There is subchondral bone  irregularity along the proximal lunate. Findings are presumed to be  related to ulnar positive variance/ulnar carpal abutment syndrome with  high-grade to full-thickness tearing of the central portion of the  triangular fibrocartilage complex. Also likely reactive edema in the  adjacent ulna.     3. The remaining tendinous and ligamentous structures about the right  wrist are intact.     4. Osteoarthrosis at the first carpometacarpal joint.     5. Mild soft tissue edema in the region of the posterior capsule,  along the posterior scaphoid and deep to the extensor tendons of the  second extensor compartment.     ADRIEN CHOW MD          ASSESSMENT and PLAN:     Sheila was seen today for follow up.    Diagnoses and all orders for this visit:    Right wrist pain  -     Occupational Therapy  Referral; Future    Pain and swelling of right forearm  -     Occupational Therapy  Referral; Future    Ulnar abutment syndrome of right wrist  -     Occupational Therapy  Referral; Future      65 yo F with right hand/forearm  pain, swelling, and bruising ~2 months ago without major trauma (although mentions a fall and moving heavy objects), MRI revealed findings consistent with ulnar carpal abutment syndrome with associated reactive bony edema and TFCC tearing.    Discussed her diagnosis and reassured that she can continue to use her hand/wrist as tolerated (she has been avoiding all activity) with symptom management as needed. Also recommended referral to Hand Therapy for further evaluation and management.    - Hand Therapy referral for rehab and custom wrist splint/brace.  - Symptom management as needed, including ice, bracing, topical Voltaren gel, or oral NSAIDs.  - Follow up PRN depending on symptom response.      Options for treatment and/or follow-up care were reviewed with the patient, who was actively involved in the decision-making process. Patient verbalized understanding and was in agreement with the plan.    The patient was seen by and discussed with attending physician Dr. Indy Freeman MD, CAQ, CCD, who agrees with the plan unless otherwise stated.    Sameera Faulkner DO  Primary Care Sports Medicine Fellow  [facility]     Attending Note:   I have  examined this patient and have reviewed the clinical presentation and progress note with the fellow. I agree with the treatment plan as outlined. The plan was formulated with the fellow on the day of the patient's visit. I have reviewed all imaging with the fellow and agree with the findings in the documentation.     Indy Freeman MD, CAQ, CCD  [facility]  Sports Medicine and Bone Health    Again, thank you for allowing me to participate in the care of your patient.      Sincerely,    Sameera Faulkner DO

## 2025-06-26 NOTE — PROGRESS NOTES
[facility]  Sports Medicine Clinic  Clinics and Surgery Center         SUBJECTIVE       Sheila Massey is a 64 year old female presenting to clinic today for follow-up of right hand/wrist pain.     Last seen on 5/22/2025:  - Ordered a right wrist MRI for further evaluation of flexor tendon tenosynovitis, ligamentous injury, or occult fracture.  - Discussed continued symptom management with icing, bracing, and topical Voltaren gel instead of Mobic as she prefers not to take oral medications.    Since last visit:   - Patient reports feeling better due to a decrease in pain, she notes she has not been using the Voltaren gel or ice lately. She notes stiffness and swelling in the mornings and notes pain along the distal and dorsal aspect of the wrist and thenar eminence.   - She completed an MRI on 6/4 and would like to discuss results and next steps.      PMH, Medications, and Allergies were reviewed and updated as needed.    ROS:  As noted above, otherwise negative.    Patient Active Problem List   Diagnosis    CARDIOVASCULAR SCREENING; LDL GOAL LESS THAN 160    Low back pain    Hypertension goal BP (blood pressure) < 140/90    Malignant neoplasm of body of pancreas (H)    Essential hypertension with goal blood pressure less than 140/90    Pulmonary infiltrate    Moderate persistent asthma, unspecified whether complicated    Bronchiectasis without complication (H)    Prediabetes       Current Outpatient Medications   Medication Sig Dispense Refill    albuterol (PROAIR HFA/PROVENTIL HFA/VENTOLIN HFA) 108 (90 Base) MCG/ACT inhaler Inhale 2 puffs into the lungs every 6 hours as needed for shortness of breath, wheezing or cough 18 g 3    amLODIPine (NORVASC) 5 MG tablet Take 1 tablet (5 mg) by mouth daily. 90 tablet 1    estradiol (ESTRACE) 0.1 MG/GM vaginal cream Place 2 g vaginally twice a week 42.5 g 3    losartan (COZAAR) 100 MG tablet Take 1 tablet (100 mg) by mouth daily. 90 tablet 3    meloxicam (MOBIC)  15 MG tablet Take 1 tablet (15 mg) by mouth daily. 14 tablet 0    Multiple Vitamins-Minerals (MULTIVITAMIN) LIQD       salmeterol (SEREVENT) 50 MCG/ACT inhaler Inhale 1 puff into the lungs 2 times daily 1 each 11    UNABLE TO FIND MEDICATION NAME: OPC-3              OBJECTIVE:       Vitals: There were no vitals filed for this visit.  BMI: There is no height or weight on file to calculate BMI.    Gen: Alert and in no acute distress.  Pulm: Breathing comfortably on room air, no increased respiratory effort.  Psych: Euthymic, appropriately answers questions.    MSK:   RIGHT WRIST  Inspection:    No significant swelling along the anterior forearm.  Palpation:    Mildly tender diffusely around the wrist (especially palmar aspect).  Range of Motion:    Slightly decreased ROM of wrist and hand due to pain/stiffness. Can fully flex fingers with some discomfort. Can fully extend fingers without much discomfort.      Exam: MRI of the right wrist dated 6/4/2025.  COMPARISON: Radiograph dated 5/9/2025.  CLINICAL HISTORY: Wrist pain and swelling. Recent fall.  TECHNIQUE: Multiplanar, multisequence MR imaging of the right wrist  was obtained using standard sequences in 3 orthogonal planes without  the use of intravenous or intra-articular gadolinium contrast.  FINDINGS:     Small amount of fluid in the distal radioulnar joint and radiocarpal  joint, physiologic.     No marrow signal abnormalities to suggest fracture or marrow  infiltration. Bone marrow edema and cystic changes in the region of  the distal ulna/ulnar styloid. Cystic changes within the scaphoid,  lunate, and triquetrum, greatest in the lunate, with subchondral bone  irregularity in the lunate, likely due to ulnar positive variance.  There is adjacent high-grade to full-thickness tearing of the central  portion of the triangular fibrocartilage complex.     The scapholunate and lunotriquetral ligaments are grossly intact on  this non arthrographic study.     The  flexor tendons are intact. The visualized median and ulnar nerves  are unremarkable. The visualized extensor tendons are intact.     Mild soft tissue edema adjacent to the posterior capsule along the  posterior aspect of the scaphoid deep to the extensor tendons of the  second extensor compartment.                                                                      IMPRESSION:  1. No marrow signal abnormalities in the right wrist to suggest  fracture or marrow infiltration.     2. Intraosseous cystic changes in the scaphoid, lunate, and  triquetrum, greatest within the lunate. There is subchondral bone  irregularity along the proximal lunate. Findings are presumed to be  related to ulnar positive variance/ulnar carpal abutment syndrome with  high-grade to full-thickness tearing of the central portion of the  triangular fibrocartilage complex. Also likely reactive edema in the  adjacent ulna.     3. The remaining tendinous and ligamentous structures about the right  wrist are intact.     4. Osteoarthrosis at the first carpometacarpal joint.     5. Mild soft tissue edema in the region of the posterior capsule,  along the posterior scaphoid and deep to the extensor tendons of the  second extensor compartment.     ADRIEN CHOW MD          ASSESSMENT and PLAN:     Sheila was seen today for follow up.    Diagnoses and all orders for this visit:    Right wrist pain  -     Occupational Therapy  Referral; Future    Pain and swelling of right forearm  -     Occupational Therapy  Referral; Future    Ulnar abutment syndrome of right wrist  -     Occupational Therapy  Referral; Future      63 yo F with right hand/forearm pain, swelling, and bruising ~2 months ago without major trauma (although mentions a fall and moving heavy objects), MRI revealed findings consistent with ulnar carpal abutment syndrome with associated reactive bony edema and TFCC tearing.    Discussed her diagnosis and reassured that  she can continue to use her hand/wrist as tolerated (she has been avoiding all activity) with symptom management as needed. Also recommended referral to Hand Therapy for further evaluation and management.    - Hand Therapy referral for rehab and custom wrist splint/brace.  - Symptom management as needed, including ice, bracing, topical Voltaren gel, or oral NSAIDs.  - Follow up PRN depending on symptom response.      Options for treatment and/or follow-up care were reviewed with the patient, who was actively involved in the decision-making process. Patient verbalized understanding and was in agreement with the plan.    The patient was seen by and discussed with attending physician Dr. Indy Freeman MD, CAQ, CCD, who agrees with the plan unless otherwise stated.    Sameera Faulkner DO  Primary Care Sports Medicine Fellow  [facility]

## 2025-07-02 NOTE — PROGRESS NOTES
Attending Note:   I have  examined this patient and have reviewed the clinical presentation and progress note with the fellow. I agree with the treatment plan as outlined. The plan was formulated with the fellow on the day of the patient's visit. I have reviewed all imaging with the fellow and agree with the findings in the documentation.     Indy Freeman MD, CAQ, CCD  [facility]  Sports Medicine and Bone Health

## 2025-07-14 ENCOUNTER — PATIENT OUTREACH (OUTPATIENT)
Dept: CARE COORDINATION | Facility: CLINIC | Age: 65
End: 2025-07-14
Payer: COMMERCIAL

## 2025-07-21 ENCOUNTER — THERAPY VISIT (OUTPATIENT)
Dept: OCCUPATIONAL THERAPY | Facility: CLINIC | Age: 65
End: 2025-07-21
Attending: FAMILY MEDICINE
Payer: COMMERCIAL

## 2025-07-21 DIAGNOSIS — M79.89 PAIN AND SWELLING OF RIGHT FOREARM: ICD-10-CM

## 2025-07-21 DIAGNOSIS — M25.531 RIGHT WRIST PAIN: ICD-10-CM

## 2025-07-21 DIAGNOSIS — M79.631 PAIN AND SWELLING OF RIGHT FOREARM: ICD-10-CM

## 2025-07-21 DIAGNOSIS — M25.831 ULNAR ABUTMENT SYNDROME OF RIGHT WRIST: ICD-10-CM

## 2025-07-21 PROCEDURE — 97110 THERAPEUTIC EXERCISES: CPT | Mod: GO

## 2025-07-21 PROCEDURE — 97535 SELF CARE MNGMENT TRAINING: CPT | Mod: GO

## 2025-07-21 PROCEDURE — 97165 OT EVAL LOW COMPLEX 30 MIN: CPT | Mod: GO

## 2025-07-21 NOTE — PROGRESS NOTES
OCCUPATIONAL THERAPY EVALUATION  Type of Visit: Evaluation       Fall Risk Screen:  Have you fallen 2 or more times in the past year?: Yes  Have you fallen and had an injury in the past year?: Yes      Subjective      Condition type:  Initial onset (within last 3 months)  Cause of current episode:  Unspecified     Nature of treatment:  Rehabilitative  Functional ability:  Moderate functional limitations  Documented POC (choose all that apply):  Frequency of treatment visits and treatment activities to address deficit areas.;Measurable short and long term/discharge treatment goals related to physical and functional deficits.;Patient agrees to program participation including home program  Briefly describe symptoms:  R wrist pain, exacerbated by gripping and forearm rotation  How did the symptoms start:  March or April 2025, started noticing during certain motions, high intensity of pain, edema and stiffness  Average pain/intensity last 24 hours:  4/10  Average pain/intensity past week:  4/10  Frequency of Symptoms:  Frequently (51-75% of the time)  Symptom impact on ADLs:  Moderately  Condition change since eval:  N/A (first visit)  General health reported by patient:  Good     Presenting condition or subjective complaint: Wrist/hand pain, decreased use - mobility  Date of onset: 06/26/25 (MD order date)    Relevant medical history: Arthritis; Asthma; Cancer; Chest pain; Dizziness; High blood pressure; History of fractures; Menopause; Migraines or headaches; Neck injury; Smoking   Dates & types of surgery: Too many to list    Prior diagnostic imaging/testing results: MRI     Prior therapy history for the same diagnosis, illness or injury: No      Prior Level of Function  Indep in all areas    Living Environment  Social support: With a significant other or spouse   Type of home: House   Stairs to enter the home: Yes 1 Is there a railing: No     Ramp: No   Stairs inside the home: Yes 8 Is there a railing: Yes     Help  at home: None  Equipment owned:       Employment: No    Hobbies/Interests: walking, reading, playing cards, riding motorcycle, cabin, friends    Patient goals for therapy: Lift, write, ride motorcycle, cut food, exercise, lots of things    Pain assessment: Pain present    Has a prefab wrist brace   Lifting  Was having difficulty with twisting at forearm or wrist - brushing teeth, cooking   Driving long distance   Driving motorcycle, too difficult to hold in the clutch  Deferring to L hand for gripping tasks   Possible trigger finger on middle finger      Objective     Upper Extremity Functional Index Score:  SCORE:   Column Totals: /80: (Patient-Rptd) 42   (A lower score indicates greater disability.)    Right hand dominant  Patient reports symptoms of pain, stiffness/loss of motion, and weakness/loss of strength    Pain Level (Scale 0-10)   7/21/2025   At Rest 0-2   With Use 0-5     Pain Description  Date 7/21/2025   Location wrist and hand - ulnar aspect, some in radial aspect and volar forearm    Pain Quality Dull, Sharp, and Shooting   Frequency intermittent     Pain is worst  daytime   Exacerbated by  Gripping, forearm rotation   Relieved by Rest, brace, CBD cream              Edema (Circumference measured in cm)   7/21/2025 7/21/2025    L R   DWC 15.0 15.5         Sensation   WNL throughout all nerve distributions; per patient report    ROM  Pain Report: - none  + mild    ++ moderate    +++ severe   Wrist 7/21/2025 7/21/2025   AROM (PROM) L R   Extension 55 62   Flexion 80 70+    RD 25 25+   UD 40 35+   Supination  WNL   Pronation  WNL     Tenderness: Pain Report: - none  + mild    ++ moderate    +++ severe   WRIST PALPATION:  Date 7/21/2025   Side R   Ulna Styloid -   TFCC -   Distal Radius    Radial Styloid    DRUJ -   Volar Scaphoid +   Dorsal Scaphoid +   Volar Lunate    Dorsal Lunate        Ulnar Dorsal Zone:Pain Report: - none  + mild    ++ moderate    +++ severe    7/21/2025   SIDE R   Radiocarpal P/S  (TFCC--stab f/a, rotate with some compression) -   Ballottement II P/S (TFCC--stab hand/wrist, pt p/s)    TFCC load Test (UD, axially load wrist c volar/dorsal mvmt) -   UMTDG test (TFCC--approximate the pisotriquetral and ulna)    Lunotriquetral Sheer Test    Piano Key Sign (DRUJ)    Piano Key Test (DRUJ--distal ulna moved in pro/sup) -   Ballottement I: E/F (DRUJ--stabilize hand/wrist, pt e/f of elbow)    Volar RU Ligament Shift (DRUJ--stab ulna and wrist, push radius volarly) -   Dorsal RU Ligament Shift (DRUJ--stab ulna and wrist, push radius dorsally) -     Strength   (Measured in pounds)  Pain Report: - none  + mild    ++ moderate    +++ severe    7/21/2025 7/21/2025   Trials L R   1  2  3 54 32+   Average       Lat Pinch 7/21/2025 7/21/2025   Trials L R   1  2  3 15 15+   Average       3 Pt Pinch 7/21/2025 7/21/2025   Trials L R   1  2  3 12 9+   Average           Assessment & Plan   CLINICAL IMPRESSIONS  Medical Diagnosis: R wrist pain    Treatment Diagnosis: R wrist pain    Impression/Assessment: Pt is a 64 year old female presenting to Occupational Therapy due to R wrist pain.  The following significant findings have been identified: Impaired activity tolerance, Impaired ROM, Impaired strength, and Pain.  These identified deficits interfere with their ability to perform self care tasks, recreational activities, household chores, driving , and meal planning and preparation as compared to previous level of function.   Patient's limitations or Problem List includes: Pain, Decreased ROM/motion, Increased edema, and Decreased  of the right wrist, hand, and thumb which interferes with the patient's ability to perform Self Care Tasks (dressing), Recreational Activities, Household Chores, and Driving  as compared to previous level of function.    Clinical Decision Making (Complexity):  Assessment of Occupational Performance: 5 or more Performance Deficits  Occupational Performance Limitations:  bathing/showering, dressing, driving and community mobility, home establishment and management, meal preparation and cleanup, and leisure activities  Clinical Decision Making (Complexity): Low complexity    PLAN OF CARE  Treatment Interventions:  Modalities:  heat  Therapeutic Exercise:  AROM, PROM, Tendon Gliding, Blocking, Isometrics, and Stabilization  Neuromuscular re-education:  Proprioceptive Training and Kinesiotaping  Manual Techniques:  Joint mobilization, Myofascial release, and Manual edema mobilization  Orthotic Fabrication:  Static  Self Care:  Self Care Tasks and Ergonomic Considerations    Long Term Goals   OT Goal 1  Goal Description: Pt will ID 2 effective pain mgmt strategies for improvement of QOL and function in I/ADLs  Rationale: In order to maximize safety and independence with ADL/IADLs  Target Date: 08/20/25  OT Goal 2  Goal Description: Pt will demo R  strength of 55# or better w/ min or less pain for improved IADL function  Rationale: In order to maximize safety and independence with ADL/IADLs  Target Date: 09/15/25      Frequency of Treatment: 1x/week  Duration of Treatment: 8 weeks     Recommended Referrals to Other Professionals: none  Education Assessment: Learner/Method: Patient  Education Comments: No barriers to learning     Risks and benefits of evaluation/treatment have been explained.   Patient/Family/caregiver agrees with Plan of Care.     Evaluation Time:    OT Eval, Low Complexity Minutes (29522): 15       Signing Clinician: VANESSA Santos [facility]                                                                                   OUTPATIENT OCCUPATIONAL THERAPY      PLAN OF TREATMENT FOR OUTPATIENT REHABILITATION   Patient's Last Name, First Name, MKiersten Castillo YOB: 1960   Provider's Name   [facility]   Medical Record No.  [id number]     Onset Date: 06/26/25 (MD order date)  Start of Care Date: 07/21/25     Medical Diagnosis:  R wrist pain      OT Treatment Diagnosis:  R wrist pain Plan of Treatment  Frequency/Duration:1x/week/8 weeks    Certification date from 07/21/25   To 10/13/25        See note for plan of treatment details and functional goals     Esperanza Koch OTR                         I CERTIFY THE NEED FOR THESE SERVICES FURNISHED UNDER        THIS PLAN OF TREATMENT AND WHILE UNDER MY CARE     (Physician attestation of this document indicates review and certification of the therapy plan).              Referring Provider:  Indy Freeman    Initial Assessment  See Epic Evaluation- 07/21/25

## 2025-07-23 ENCOUNTER — TELEPHONE (OUTPATIENT)
Dept: FAMILY MEDICINE | Facility: CLINIC | Age: 65
End: 2025-07-23
Payer: COMMERCIAL

## 2025-07-23 NOTE — TELEPHONE ENCOUNTER
Patient Quality Outreach    Patient is due for the following:   Asthma  -  ACT needed, Asthma follow-up visit, and AAP  Hypertension -  Hypertension follow-up visit  Physical Annual Wellness Visit      Topic Date Due    Zoster (Shingles) Vaccine (1 of 2) Never done    COVID-19 Vaccine (3 - 2024-25 season) 09/01/2024       Action(s) Taken:   Patient has upcoming appointment, these items will be addressed at that time.    Type of outreach:    Patient made an appointment    Questions for provider review:    None         Ruby Dominguez CMA  Chart routed to None.

## 2025-08-10 SDOH — HEALTH STABILITY: PHYSICAL HEALTH: ON AVERAGE, HOW MANY DAYS PER WEEK DO YOU ENGAGE IN MODERATE TO STRENUOUS EXERCISE (LIKE A BRISK WALK)?: 3 DAYS

## 2025-08-10 SDOH — HEALTH STABILITY: PHYSICAL HEALTH: ON AVERAGE, HOW MANY MINUTES DO YOU ENGAGE IN EXERCISE AT THIS LEVEL?: 30 MIN

## 2025-08-10 ASSESSMENT — SOCIAL DETERMINANTS OF HEALTH (SDOH): HOW OFTEN DO YOU GET TOGETHER WITH FRIENDS OR RELATIVES?: PATIENT DECLINED

## 2025-08-11 ENCOUNTER — THERAPY VISIT (OUTPATIENT)
Dept: OCCUPATIONAL THERAPY | Facility: CLINIC | Age: 65
End: 2025-08-11
Payer: COMMERCIAL

## 2025-08-11 DIAGNOSIS — M25.831 ULNAR ABUTMENT SYNDROME OF RIGHT WRIST: ICD-10-CM

## 2025-08-11 DIAGNOSIS — M25.531 RIGHT WRIST PAIN: Primary | ICD-10-CM

## 2025-08-11 PROCEDURE — 97110 THERAPEUTIC EXERCISES: CPT | Mod: GO

## 2025-08-11 PROCEDURE — 97760 ORTHOTIC MGMT&TRAING 1ST ENC: CPT | Mod: GO

## 2025-08-14 ENCOUNTER — OFFICE VISIT (OUTPATIENT)
Dept: FAMILY MEDICINE | Facility: CLINIC | Age: 65
End: 2025-08-14
Attending: FAMILY MEDICINE
Payer: COMMERCIAL

## 2025-08-14 VITALS
BODY MASS INDEX: 18.51 KG/M2 | DIASTOLIC BLOOD PRESSURE: 72 MMHG | RESPIRATION RATE: 16 BRPM | TEMPERATURE: 98 F | HEIGHT: 69 IN | WEIGHT: 125 LBS | OXYGEN SATURATION: 99 % | SYSTOLIC BLOOD PRESSURE: 130 MMHG | HEART RATE: 70 BPM

## 2025-08-14 DIAGNOSIS — M25.831 ULNAR ABUTMENT SYNDROME OF RIGHT WRIST: ICD-10-CM

## 2025-08-14 DIAGNOSIS — R73.03 PREDIABETES: ICD-10-CM

## 2025-08-14 DIAGNOSIS — J47.9 BRONCHIECTASIS WITHOUT COMPLICATION (H): ICD-10-CM

## 2025-08-14 DIAGNOSIS — Z12.31 VISIT FOR SCREENING MAMMOGRAM: ICD-10-CM

## 2025-08-14 DIAGNOSIS — Z00.00 ENCOUNTER FOR MEDICARE ANNUAL WELLNESS EXAM: Primary | ICD-10-CM

## 2025-08-14 DIAGNOSIS — J45.40 MODERATE PERSISTENT ASTHMA, UNSPECIFIED WHETHER COMPLICATED: ICD-10-CM

## 2025-08-14 DIAGNOSIS — Z23 NEED FOR VACCINATION: ICD-10-CM

## 2025-08-14 DIAGNOSIS — I10 HYPERTENSION GOAL BP (BLOOD PRESSURE) < 140/90: ICD-10-CM

## 2025-08-14 DIAGNOSIS — C25.1 MALIGNANT NEOPLASM OF BODY OF PANCREAS (H): ICD-10-CM

## 2025-08-14 LAB
ANION GAP SERPL CALCULATED.3IONS-SCNC: 11 MMOL/L (ref 7–15)
BUN SERPL-MCNC: 9.7 MG/DL (ref 8–23)
CALCIUM SERPL-MCNC: 9.5 MG/DL (ref 8.8–10.4)
CHLORIDE SERPL-SCNC: 104 MMOL/L (ref 98–107)
CHOLEST SERPL-MCNC: 222 MG/DL
CREAT SERPL-MCNC: 0.79 MG/DL (ref 0.51–0.95)
EGFRCR SERPLBLD CKD-EPI 2021: 82 ML/MIN/1.73M2
EST. AVERAGE GLUCOSE BLD GHB EST-MCNC: 123 MG/DL
FASTING STATUS PATIENT QL REPORTED: YES
FASTING STATUS PATIENT QL REPORTED: YES
GLUCOSE SERPL-MCNC: 117 MG/DL (ref 70–99)
HBA1C MFR BLD: 5.9 % (ref 0–5.6)
HCO3 SERPL-SCNC: 25 MMOL/L (ref 22–29)
HDLC SERPL-MCNC: 102 MG/DL
LDLC SERPL CALC-MCNC: 95 MG/DL
NONHDLC SERPL-MCNC: 120 MG/DL
POTASSIUM SERPL-SCNC: 4.3 MMOL/L (ref 3.4–5.3)
SODIUM SERPL-SCNC: 140 MMOL/L (ref 135–145)
TRIGL SERPL-MCNC: 124 MG/DL

## 2025-08-14 RX ORDER — LOSARTAN POTASSIUM 100 MG/1
50 TABLET ORAL DAILY
Qty: 90 TABLET | Refills: 0 | Status: SHIPPED | OUTPATIENT
Start: 2025-08-14

## 2025-08-14 ASSESSMENT — ASTHMA QUESTIONNAIRES
ACT_TOTALSCORE: 21
QUESTION_4 LAST FOUR WEEKS HOW OFTEN HAVE YOU USED YOUR RESCUE INHALER OR NEBULIZER MEDICATION (SUCH AS ALBUTEROL): ONCE A WEEK OR LESS
QUESTION_2 LAST FOUR WEEKS HOW OFTEN HAVE YOU HAD SHORTNESS OF BREATH: ONCE OR TWICE A WEEK
QUESTION_3 LAST FOUR WEEKS HOW OFTEN DID YOUR ASTHMA SYMPTOMS (WHEEZING, COUGHING, SHORTNESS OF BREATH, CHEST TIGHTNESS OR PAIN) WAKE YOU UP AT NIGHT OR EARLIER THAN USUAL IN THE MORNING: NOT AT ALL
QUESTION_5 LAST FOUR WEEKS HOW WOULD YOU RATE YOUR ASTHMA CONTROL: WELL CONTROLLED
QUESTION_1 LAST FOUR WEEKS HOW MUCH OF THE TIME DID YOUR ASTHMA KEEP YOU FROM GETTING AS MUCH DONE AT WORK, SCHOOL OR AT HOME: A LITTLE OF THE TIME

## 2025-08-14 ASSESSMENT — PAIN SCALES - GENERAL: PAINLEVEL_OUTOF10: NO PAIN (0)

## 2025-08-25 ENCOUNTER — THERAPY VISIT (OUTPATIENT)
Dept: OCCUPATIONAL THERAPY | Facility: CLINIC | Age: 65
End: 2025-08-25
Payer: COMMERCIAL

## 2025-08-25 DIAGNOSIS — M25.831 ULNAR ABUTMENT SYNDROME OF RIGHT WRIST: ICD-10-CM

## 2025-08-25 DIAGNOSIS — M25.531 RIGHT WRIST PAIN: Primary | ICD-10-CM

## 2025-08-25 PROCEDURE — 97140 MANUAL THERAPY 1/> REGIONS: CPT | Mod: GO

## 2025-08-25 PROCEDURE — 97110 THERAPEUTIC EXERCISES: CPT | Mod: GO

## (undated) RX ORDER — ALBUTEROL SULFATE 0.83 MG/ML
SOLUTION RESPIRATORY (INHALATION)
Status: DISPENSED
Start: 2018-11-05

## (undated) RX ORDER — ONDANSETRON 2 MG/ML
INJECTION INTRAMUSCULAR; INTRAVENOUS
Status: DISPENSED
Start: 2019-05-07

## (undated) RX ORDER — ONDANSETRON 2 MG/ML
INJECTION INTRAMUSCULAR; INTRAVENOUS
Status: DISPENSED
Start: 2022-09-08

## (undated) RX ORDER — ALBUTEROL SULFATE 0.83 MG/ML
SOLUTION RESPIRATORY (INHALATION)
Status: DISPENSED
Start: 2021-12-14

## (undated) RX ORDER — LIDOCAINE HYDROCHLORIDE 20 MG/ML
JELLY TOPICAL
Status: DISPENSED
Start: 2019-05-07

## (undated) RX ORDER — FENTANYL CITRATE 50 UG/ML
INJECTION, SOLUTION INTRAMUSCULAR; INTRAVENOUS
Status: DISPENSED
Start: 2019-05-07

## (undated) RX ORDER — FENTANYL CITRATE 50 UG/ML
INJECTION, SOLUTION INTRAMUSCULAR; INTRAVENOUS
Status: DISPENSED
Start: 2022-09-08